# Patient Record
Sex: FEMALE | Race: WHITE | NOT HISPANIC OR LATINO | Employment: OTHER | URBAN - METROPOLITAN AREA
[De-identification: names, ages, dates, MRNs, and addresses within clinical notes are randomized per-mention and may not be internally consistent; named-entity substitution may affect disease eponyms.]

---

## 2017-01-12 ENCOUNTER — HOSPITAL ENCOUNTER (OUTPATIENT)
Dept: RADIOLOGY | Facility: HOSPITAL | Age: 77
Discharge: HOME | End: 2017-01-12
Attending: NURSE PRACTITIONER
Payer: MEDICARE

## 2017-01-12 ENCOUNTER — OFFICE VISIT (OUTPATIENT)
Dept: URGENT CARE | Facility: CLINIC | Age: 77
End: 2017-01-12
Payer: MEDICARE

## 2017-01-12 VITALS
HEART RATE: 84 BPM | SYSTOLIC BLOOD PRESSURE: 120 MMHG | BODY MASS INDEX: 23.39 KG/M2 | OXYGEN SATURATION: 96 % | WEIGHT: 154.31 LBS | TEMPERATURE: 99 F | HEIGHT: 68 IN | DIASTOLIC BLOOD PRESSURE: 84 MMHG

## 2017-01-12 DIAGNOSIS — R05.9 COUGH: ICD-10-CM

## 2017-01-12 DIAGNOSIS — J06.9 ACUTE URI: Primary | ICD-10-CM

## 2017-01-12 DIAGNOSIS — H65.93 MIDDLE EAR EFFUSION, BILATERAL: ICD-10-CM

## 2017-01-12 PROCEDURE — 99214 OFFICE O/P EST MOD 30 MIN: CPT | Mod: S$GLB,,, | Performed by: NURSE PRACTITIONER

## 2017-01-12 PROCEDURE — 1160F RVW MEDS BY RX/DR IN RCRD: CPT | Mod: S$GLB,,, | Performed by: NURSE PRACTITIONER

## 2017-01-12 PROCEDURE — 71020 XR CHEST PA AND LATERAL: CPT | Mod: 26,,, | Performed by: RADIOLOGY

## 2017-01-12 PROCEDURE — 1159F MED LIST DOCD IN RCRD: CPT | Mod: S$GLB,,, | Performed by: NURSE PRACTITIONER

## 2017-01-12 PROCEDURE — 1157F ADVNC CARE PLAN IN RCRD: CPT | Mod: S$GLB,,, | Performed by: NURSE PRACTITIONER

## 2017-01-12 PROCEDURE — 3079F DIAST BP 80-89 MM HG: CPT | Mod: S$GLB,,, | Performed by: NURSE PRACTITIONER

## 2017-01-12 PROCEDURE — 71020 XR CHEST PA AND LATERAL: CPT | Mod: TC,PO

## 2017-01-12 PROCEDURE — 3074F SYST BP LT 130 MM HG: CPT | Mod: S$GLB,,, | Performed by: NURSE PRACTITIONER

## 2017-01-12 PROCEDURE — 99999 PR PBB SHADOW E&M-EST. PATIENT-LVL IV: CPT | Mod: PBBFAC,,, | Performed by: NURSE PRACTITIONER

## 2017-01-12 PROCEDURE — 1125F AMNT PAIN NOTED PAIN PRSNT: CPT | Mod: S$GLB,,, | Performed by: NURSE PRACTITIONER

## 2017-01-12 RX ORDER — BENZONATATE 100 MG/1
100 CAPSULE ORAL 3 TIMES DAILY PRN
Qty: 30 CAPSULE | Refills: 0 | Status: SHIPPED | OUTPATIENT
Start: 2017-01-12 | End: 2017-01-22

## 2017-01-12 RX ORDER — ALBUTEROL SULFATE 90 UG/1
1-2 AEROSOL, METERED RESPIRATORY (INHALATION) EVERY 6 HOURS PRN
Qty: 1 INHALER | Refills: 0 | Status: SHIPPED | OUTPATIENT
Start: 2017-01-12 | End: 2017-08-09

## 2017-01-12 RX ORDER — CETIRIZINE HYDROCHLORIDE 10 MG/1
10 TABLET ORAL DAILY
Qty: 10 TABLET | Refills: 0 | COMMUNITY
Start: 2017-01-12 | End: 2018-07-03

## 2017-01-12 RX ORDER — METHYLPREDNISOLONE 4 MG/1
TABLET ORAL
Qty: 1 PACKAGE | Refills: 0 | Status: SHIPPED | OUTPATIENT
Start: 2017-01-12 | End: 2017-08-09

## 2017-01-12 NOTE — MR AVS SNAPSHOT
[facility]  [address]  [facility] [address]  Phone: [phone]  Fax: [phone]                  Midgalia Luna   2017 10:40 AM   Office Visit    Description:  Female : 1940   Provider:  [facility]   Department:  [place] [facility]           Reason for Visit     Cough           Diagnoses this Visit        Comments    Acute URI    -  Primary Hydrate and rest.  If no improvement, follow up.      Cough     Take medications as ordered.  Will call with xray report and send prescription if needed.     Middle ear effusion, bilateral                To Do List           Future Appointments        Provider Department Dept Phone    2017 11:30 AM [facility] XR1 [facility] [phone]      Goals (5 Years of Data)     None       These Medications        Disp Refills Start End    albuterol 90 mcg/actuation inhaler 1 Inhaler 0 2017     Inhale 1-2 puffs into the lungs every 6 (six) hours as needed for Wheezing (Cough). - Inhalation    Pharmacy: [facility] [address] [place] [address] Ph #: [phone]       benzonatate (TESSALON) 100 MG capsule 30 capsule 0 2017    Take 1 capsule (100 mg total) by mouth 3 (three) times daily as needed for Cough (Do not take more than 6 capsules in a 24 hour period.). - Oral    Pharmacy: [facility] [address] [place] [address] Ph #: [phone]         PURCHASE these Medications (No prescription required)        Start End    cetirizine (ZYRTEC) 10 MG tablet 2017    Sig - Route: Take 1 tablet (10 mg total) by mouth once daily. - Oral    Class: OTC      [facility] On [facility]     [facility] On Call Nurse Care Line -  Assistance  Registered nurses in the [facility] On Call Center provide clinical advisement, health education, appointment booking, and other advisory services.  Call for this free service at [phone].              Medications           Message regarding Medications     Verify the changes and/or additions to your medication regime listed below are the same as discussed with your clinician today.  If any of these changes or additions are incorrect, please notify your healthcare provider.        START taking these NEW medications        Refills    albuterol 90 mcg/actuation inhaler 0    Sig: Inhale 1-2 puffs into the lungs every 6 (six) hours as needed for Wheezing (Cough).    Class: Normal    Route: Inhalation    benzonatate (TESSALON) 100 MG capsule 0    Sig: Take 1 capsule (100 mg total) by mouth 3 (three) times daily as needed for Cough (Do not take more than 6 capsules in a 24 hour period.).    Class: Normal    Route: Oral    cetirizine (ZYRTEC) 10 MG tablet 0    Sig: Take 1 tablet (10 mg total) by mouth once daily.    Class: OTC    Route: Oral           Verify that the below list of medications is an accurate representation of the medications you are currently taking.  If none reported, the list may be blank. If incorrect, please contact your healthcare provider. Carry this list with you in case of emergency.           Current Medications     diphenhydrAMINE (BENADRYL ALLERGY) 25 mg tablet Take 1 tablet by mouth Daily.    furosemide (LASIX) 40 MG tablet Take 1 tablet (40 mg total) by mouth daily as needed.    omeprazole (PRILOSEC) 40 MG capsule Take 1 capsule (40 mg total) by mouth once daily.    sumatriptan (IMITREX) 100 MG tablet Take 1 tablet (100 mg total) by mouth 2 (two) times daily as needed.    verapamil (VERELAN) 180 MG C24P Take 1 capsule (180 mg total) by mouth once daily.    zolpidem (AMBIEN) 10 mg Tab TAKE 1 TABLET EVERY NIGHT AS NEEDED    albuterol 90 mcg/actuation inhaler Inhale 1-2 puffs into the lungs every 6 (six) hours as needed for Wheezing (Cough).    benzonatate (TESSALON) 100 MG capsule Take 1 capsule (100 mg total) by mouth 3 (three) times daily as needed for Cough (Do not take more than 6  "capsules in a 24 hour period.).    cetirizine (ZYRTEC) 10 MG tablet Take 1 tablet (10 mg total) by mouth once daily.    meloxicam (MOBIC) 7.5 MG tablet TAKE 1 TABLET TWICE DAILY AS NEEDED FOR PAIN           Clinical Reference Information           Vital Signs - Last Recorded  Most recent update: 1/12/2017 11:03 AM by Winnie Michele LPN    BP Pulse Temp Ht Wt SpO2    120/84 (BP Location: Left arm, Patient Position: Sitting, BP Method: Manual) 84 98.6 °F (37 °C) (Tympanic) 5' 8" (1.727 m) 70 kg (154 lb 5.2 oz) 96%    BMI                23.46 kg/m2          Blood Pressure          Most Recent Value    BP  120/84      Allergies as of 1/12/2017     Sulfa (Sulfonamide Antibiotics)      Immunizations Administered on Date of Encounter - 1/12/2017     None      Orders Placed During Today's Visit     Future Labs/Procedures Expected by Expires    X-Ray Chest PA And Lateral  1/12/2017 1/12/2018      Instructions      Adult Self-Care for Colds  Colds are caused by viruses. They cant be cured with antibiotics. However, you can relieve symptoms and support your bodys efforts to heal itself. No matter which symptoms you have, be sure to drink plenty of fluids (water or clear soup); stop smoking and drinking alcohol; and get plenty of rest.   Understand a fever  · Take your temperature several times a day. If your fever is 100.4°F (38.0°C) for more than a day, call your doctor.  · Relax, lie down. Go to bed if you want. Just get off your feet and rest. Also, drink plenty of fluids to avoid dehydration.  · Take acetaminophen or a nonsteroidal anti-inflammatory agent (NSAID), such as ibuprofen.  Treat a troubled nose kindly  · Breathe steam or heated humidified air to open blocked nasal passages.  a hot shower or use a vaporizer. Be careful not to get burned by the steam.  · Saline nasal sprays and decongestant tablets help open a stuffy nose. Antihistamines can also help, but they can cause side effects such as drowsiness " and drying of the eyes, nose, and mouth.  Soothe a sore throat and cough  · Gargle every 2 hours with 1/4 teaspoon of salt dissolved in 1/2 cup of warm water. Suck on throat lozenges and cough drops to moisten your throat.  · Cough medicines are available but it is unclear how effective they actually are.  · Take acetaminophen or an NSAID, such as ibuprofen to ease throat pain  Ease digestive problems  · Put fluid back into your body. Take frequent sips of clear liquids such as water or broth. Do not drink beverages with a lot of sugar in them, such as juices and sodas. These can make diarrhea worse. Older children and adults can drink sports drinks.  · As your appetite returns, you can resume your normal diet. Ask your doctor whether there are any foods you should avoid.     When to seek medical care  When you first notice symptoms, ask your health care provider if antiviral medications are appropriate. Antibiotics should not be taken for colds or flu. Also, call your doctor if you have any of the following symptoms or if you arent feeling better after 7 days:  · Shortness of breath  · Pain or pressure in the chest or abdomen  · Worsening symptoms, especially after a period of improvement  · Fever of 100.4°F  (38.0°C) or higher, or fever that doesnt go down with medication  · Sudden dizziness or confusion  · Severe or continued vomiting  · Signs of dehydration, including extreme thirst, dark urine, infrequent urination, dry mouth  · Spotted, red, or very sore throat      © 2867-0506 [employer]. [address] [place] [address], [place] [address]. All rights reserved. This information is not intended as a substitute for professional medical care. Always follow your healthcare professional's instructions.

## 2017-01-12 NOTE — PROGRESS NOTES
"CC:COUGH  HPI:This is a new problem.   Migdalia Luna is a 76 y.o. female with a history of cough.  The current episode started in the past 2 days.   The problem has been gradually worsening.   Associated symptoms included fever, chills, nasal congestion, cough, dyspnea, wheezing, right ear pain.   Pertinent negatives include sore throat, chest pain, hemoptysis   Treatments tried: Nyquil/Dayquil has been used and this has provided no relief.     Review of patient's allergies indicates:   Allergen Reactions    Sulfa (sulfonamide antibiotics)      Other reaction(s): Unknown       Outpatient Medications Prior to Visit   Medication Sig Dispense Refill    diphenhydrAMINE (BENADRYL ALLERGY) 25 mg tablet Take 1 tablet by mouth Daily.      furosemide (LASIX) 40 MG tablet Take 1 tablet (40 mg total) by mouth daily as needed. 90 tablet 3    omeprazole (PRILOSEC) 40 MG capsule Take 1 capsule (40 mg total) by mouth once daily. 90 capsule 3    sumatriptan (IMITREX) 100 MG tablet Take 1 tablet (100 mg total) by mouth 2 (two) times daily as needed. 27 tablet 3    verapamil (VERELAN) 180 MG C24P Take 1 capsule (180 mg total) by mouth once daily. 90 capsule 3    zolpidem (AMBIEN) 10 mg Tab TAKE 1 TABLET EVERY NIGHT AS NEEDED 90 tablet 1    meloxicam (MOBIC) 7.5 MG tablet TAKE 1 TABLET TWICE DAILY AS NEEDED FOR PAIN 180 tablet 3     No facility-administered medications prior to visit.         Physical Exam   Visit Vitals    /84 (BP Location: Left arm, Patient Position: Sitting, BP Method: Manual)    Pulse 84    Temp 98.6 °F (37 °C) (Tympanic)    Ht 5' 8" (1.727 m)    Wt 70 kg (154 lb 5.2 oz)    SpO2 96%    BMI 23.46 kg/m2     Constitutional: The patient appears well-developed and well-nourished.   Head: Normocephalic and atraumatic.   Right Ear: Tympanic membrane and ear canal normal. No drainage, swelling or tenderness. Tympanic membrane has effusion   Left Ear: Ear canal normal. No drainage, swelling or " tenderness. Tympanic membrane has effusion  Nose:  No mucosal edema or rhinitis is noted.      Mouth/Throat: Uvula is midline.  Posterior oropharynx is mildly erythematous. No oropharyngeal exudate is noted.    Cardiovascular: Normal rate, regular rhythm, S1 normal, S2 normal and normal heart sounds.  Exam reveals no gallop, no S3, no S4 and no friction rub.    No murmur heard.  Pulmonary/Chest: Effort normal and breath sounds are coarse especially with cough. No stridor. Not tachypneic. No respiratory distress. The patient has no wheezes. The patient has no rhonchi. The patient has no rales.   Skin: The patient is not diaphoretic.     Encounter Diagnoses   Name Primary?    Acute URI Yes    Cough        PLAN:    Migdalia was seen today for cough.    Diagnoses and all orders for this visit:    Acute URI  Comments:  Hydrate and rest.  If no improvement, follow up.      Cough  Comments:  Take medications as ordered.  Will call with [facility] report and send prescription if needed.   Orders:  -     X-Ray Chest PA And Lateral; Future  -     albuterol 90 mcg/actuation inhaler; Inhale 1-2 puffs into the lungs every 6 (six) hours as needed for Wheezing (Cough).  -     benzonatate (TESSALON) 100 MG capsule; Take 1 capsule (100 mg total) by mouth 3 (three) times daily as needed for Cough (Do not take more than 6 capsules in a 24 hour period.).        Medications Ordered This Encounter      albuterol 90 mcg/actuation inhaler          Sig: Inhale 1-2 puffs into the lungs every 6 (six) hours as needed for Wheezing (Cough).          Dispense:  1 Inhaler          Refill:  0      benzonatate (TESSALON) 100 MG capsule          Sig: Take 1 capsule (100 mg total) by mouth 3 (three) times daily as needed for Cough (Do not take more than 6 capsules in a 24 hour period.).          Dispense:  30 capsule          Refill:  0  Orders Placed This Encounter   Procedures    X-Ray Chest PA And Lateral     Standing Status:   Future     Standing  Expiration Date:   1/12/2018     RTC if symptoms are worsening or changing significantly or if not improved by the end of therapy.   Will send results of chest x-ray through patient portal.

## 2017-01-12 NOTE — PATIENT INSTRUCTIONS
Adult Self-Care for Colds  Colds are caused by viruses. They cant be cured with antibiotics. However, you can relieve symptoms and support your bodys efforts to heal itself. No matter which symptoms you have, be sure to drink plenty of fluids (water or clear soup); stop smoking and drinking alcohol; and get plenty of rest.   Understand a fever  · Take your temperature several times a day. If your fever is 100.4°F (38.0°C) for more than a day, call your doctor.  · Relax, lie down. Go to bed if you want. Just get off your feet and rest. Also, drink plenty of fluids to avoid dehydration.  · Take acetaminophen or a nonsteroidal anti-inflammatory agent (NSAID), such as ibuprofen.  Treat a troubled nose kindly  · Breathe steam or heated humidified air to open blocked nasal passages.  a hot shower or use a vaporizer. Be careful not to get burned by the steam.  · Saline nasal sprays and decongestant tablets help open a stuffy nose. Antihistamines can also help, but they can cause side effects such as drowsiness and drying of the eyes, nose, and mouth.  Soothe a sore throat and cough  · Gargle every 2 hours with 1/4 teaspoon of salt dissolved in 1/2 cup of warm water. Suck on throat lozenges and cough drops to moisten your throat.  · Cough medicines are available but it is unclear how effective they actually are.  · Take acetaminophen or an NSAID, such as ibuprofen to ease throat pain  Ease digestive problems  · Put fluid back into your body. Take frequent sips of clear liquids such as water or broth. Do not drink beverages with a lot of sugar in them, such as juices and sodas. These can make diarrhea worse. Older children and adults can drink sports drinks.  · As your appetite returns, you can resume your normal diet. Ask your doctor whether there are any foods you should avoid.     When to seek medical care  When you first notice symptoms, ask your health care provider if antiviral medications are  appropriate. Antibiotics should not be taken for colds or flu. Also, call your doctor if you have any of the following symptoms or if you arent feeling better after 7 days:  · Shortness of breath  · Pain or pressure in the chest or abdomen  · Worsening symptoms, especially after a period of improvement  · Fever of 100.4°F  (38.0°C) or higher, or fever that doesnt go down with medication  · Sudden dizziness or confusion  · Severe or continued vomiting  · Signs of dehydration, including extreme thirst, dark urine, infrequent urination, dry mouth  · Spotted, red, or very sore throat      © 0541-3159 [employer]. [address] [place] [address], [place] [address]. All rights reserved. This information is not intended as a substitute for professional medical care. Always follow your healthcare professional's instructions.

## 2017-02-24 ENCOUNTER — PATIENT MESSAGE (OUTPATIENT)
Dept: INTERNAL MEDICINE | Facility: CLINIC | Age: 77
End: 2017-02-24

## 2017-03-22 DIAGNOSIS — G43.909 MIGRAINE WITHOUT STATUS MIGRAINOSUS, NOT INTRACTABLE, UNSPECIFIED MIGRAINE TYPE: ICD-10-CM

## 2017-03-22 RX ORDER — ZOLPIDEM TARTRATE 10 MG/1
TABLET ORAL
Qty: 90 TABLET | Refills: 1 | Status: SHIPPED | OUTPATIENT
Start: 2017-03-22 | End: 2017-11-29 | Stop reason: SDUPTHER

## 2017-03-22 RX ORDER — SUMATRIPTAN SUCCINATE 100 MG/1
TABLET ORAL
Qty: 27 TABLET | Refills: 3 | Status: SHIPPED | OUTPATIENT
Start: 2017-03-22 | End: 2018-01-25 | Stop reason: SDUPTHER

## 2017-05-27 DIAGNOSIS — M81.0 POSTMENOPAUSAL BONE LOSS: ICD-10-CM

## 2017-05-27 DIAGNOSIS — Z12.31 SCREENING MAMMOGRAM, ENCOUNTER FOR: ICD-10-CM

## 2017-05-27 DIAGNOSIS — I10 ESSENTIAL HYPERTENSION: Primary | ICD-10-CM

## 2017-05-27 DIAGNOSIS — E78.5 HYPERLIPIDEMIA, UNSPECIFIED HYPERLIPIDEMIA TYPE: ICD-10-CM

## 2017-05-29 ENCOUNTER — TELEPHONE (OUTPATIENT)
Dept: INTERNAL MEDICINE | Facility: CLINIC | Age: 77
End: 2017-05-29

## 2017-05-29 RX ORDER — FUROSEMIDE 40 MG/1
TABLET ORAL
Qty: 90 TABLET | Refills: 3 | Status: SHIPPED | OUTPATIENT
Start: 2017-05-29 | End: 2018-05-16 | Stop reason: SDUPTHER

## 2017-05-29 RX ORDER — VERAPAMIL HYDROCHLORIDE 180 MG/1
CAPSULE, EXTENDED RELEASE ORAL
Qty: 90 CAPSULE | Refills: 3 | Status: SHIPPED | OUTPATIENT
Start: 2017-05-29 | End: 2018-05-16 | Stop reason: SDUPTHER

## 2017-06-02 ENCOUNTER — PATIENT MESSAGE (OUTPATIENT)
Dept: INTERNAL MEDICINE | Facility: CLINIC | Age: 77
End: 2017-06-02

## 2017-06-06 ENCOUNTER — LAB VISIT (OUTPATIENT)
Dept: LAB | Facility: HOSPITAL | Age: 77
End: 2017-06-06
Attending: INTERNAL MEDICINE
Payer: MEDICARE

## 2017-06-06 DIAGNOSIS — I10 ESSENTIAL HYPERTENSION: ICD-10-CM

## 2017-06-06 LAB
ALBUMIN SERPL BCP-MCNC: 3.4 G/DL
ALP SERPL-CCNC: 84 U/L
ALT SERPL W/O P-5'-P-CCNC: 14 U/L
ANION GAP SERPL CALC-SCNC: 9 MMOL/L
AST SERPL-CCNC: 18 U/L
BASOPHILS # BLD AUTO: 0.02 K/UL
BASOPHILS NFR BLD: 0.4 %
BILIRUB SERPL-MCNC: 0.3 MG/DL
BUN SERPL-MCNC: 16 MG/DL
CALCIUM SERPL-MCNC: 9.4 MG/DL
CHLORIDE SERPL-SCNC: 109 MMOL/L
CHOLEST/HDLC SERPL: 3.8 {RATIO}
CO2 SERPL-SCNC: 25 MMOL/L
CREAT SERPL-MCNC: 0.9 MG/DL
DIFFERENTIAL METHOD: ABNORMAL
EOSINOPHIL # BLD AUTO: 0.2 K/UL
EOSINOPHIL NFR BLD: 4 %
ERYTHROCYTE [DISTWIDTH] IN BLOOD BY AUTOMATED COUNT: 14.3 %
EST. GFR  (AFRICAN AMERICAN): >60 ML/MIN/1.73 M^2
EST. GFR  (NON AFRICAN AMERICAN): >60 ML/MIN/1.73 M^2
GLUCOSE SERPL-MCNC: 79 MG/DL
HCT VFR BLD AUTO: 37.1 %
HDL/CHOLESTEROL RATIO: 26 %
HDLC SERPL-MCNC: 192 MG/DL
HDLC SERPL-MCNC: 50 MG/DL
HGB BLD-MCNC: 11.8 G/DL
LDLC SERPL CALC-MCNC: 116.4 MG/DL
LYMPHOCYTES # BLD AUTO: 1.6 K/UL
LYMPHOCYTES NFR BLD: 30.2 %
MCH RBC QN AUTO: 32.3 PG
MCHC RBC AUTO-ENTMCNC: 31.8 %
MCV RBC AUTO: 102 FL
MONOCYTES # BLD AUTO: 0.5 K/UL
MONOCYTES NFR BLD: 10.1 %
NEUTROPHILS # BLD AUTO: 2.9 K/UL
NEUTROPHILS NFR BLD: 55.1 %
NONHDLC SERPL-MCNC: 142 MG/DL
PLATELET # BLD AUTO: 369 K/UL
PMV BLD AUTO: 9.8 FL
POTASSIUM SERPL-SCNC: 4.6 MMOL/L
PROT SERPL-MCNC: 6.5 G/DL
RBC # BLD AUTO: 3.65 M/UL
SODIUM SERPL-SCNC: 143 MMOL/L
TRIGL SERPL-MCNC: 128 MG/DL
TSH SERPL DL<=0.005 MIU/L-ACNC: 2.74 UIU/ML
WBC # BLD AUTO: 5.23 K/UL

## 2017-06-06 PROCEDURE — 80061 LIPID PANEL: CPT

## 2017-06-06 PROCEDURE — 85025 COMPLETE CBC W/AUTO DIFF WBC: CPT

## 2017-06-06 PROCEDURE — 36415 COLL VENOUS BLD VENIPUNCTURE: CPT | Mod: PO

## 2017-06-06 PROCEDURE — 80053 COMPREHEN METABOLIC PANEL: CPT

## 2017-06-06 PROCEDURE — 84443 ASSAY THYROID STIM HORMONE: CPT

## 2017-06-21 ENCOUNTER — HOSPITAL ENCOUNTER (OUTPATIENT)
Dept: RADIOLOGY | Facility: HOSPITAL | Age: 77
Discharge: HOME | End: 2017-06-21
Attending: INTERNAL MEDICINE
Payer: MEDICARE

## 2017-06-21 VITALS — HEIGHT: 68 IN | BODY MASS INDEX: 23.34 KG/M2 | WEIGHT: 154 LBS

## 2017-06-21 DIAGNOSIS — Z12.31 SCREENING MAMMOGRAM, ENCOUNTER FOR: ICD-10-CM

## 2017-06-21 PROCEDURE — 77067 SCR MAMMO BI INCL CAD: CPT | Mod: TC

## 2017-06-21 PROCEDURE — 77067 SCR MAMMO BI INCL CAD: CPT | Mod: 26,,, | Performed by: RADIOLOGY

## 2017-06-21 PROCEDURE — 77063 BREAST TOMOSYNTHESIS BI: CPT | Mod: 26,,, | Performed by: RADIOLOGY

## 2017-08-09 ENCOUNTER — OFFICE VISIT (OUTPATIENT)
Dept: INTERNAL MEDICINE | Facility: CLINIC | Age: 77
End: 2017-08-09
Payer: MEDICARE

## 2017-08-09 VITALS
SYSTOLIC BLOOD PRESSURE: 126 MMHG | HEIGHT: 68 IN | BODY MASS INDEX: 23.95 KG/M2 | TEMPERATURE: 99 F | HEART RATE: 79 BPM | WEIGHT: 158.06 LBS | OXYGEN SATURATION: 97 % | DIASTOLIC BLOOD PRESSURE: 78 MMHG

## 2017-08-09 DIAGNOSIS — K21.9 GASTROESOPHAGEAL REFLUX DISEASE WITHOUT ESOPHAGITIS: ICD-10-CM

## 2017-08-09 DIAGNOSIS — G43.109 MIGRAINE WITH AURA AND WITHOUT STATUS MIGRAINOSUS, NOT INTRACTABLE: ICD-10-CM

## 2017-08-09 DIAGNOSIS — I10 ESSENTIAL HYPERTENSION: ICD-10-CM

## 2017-08-09 DIAGNOSIS — F32.A DEPRESSION, UNSPECIFIED DEPRESSION TYPE: ICD-10-CM

## 2017-08-09 DIAGNOSIS — Z00.00 ROUTINE GENERAL MEDICAL EXAMINATION AT A HEALTH CARE FACILITY: Primary | ICD-10-CM

## 2017-08-09 DIAGNOSIS — E78.5 HYPERLIPIDEMIA, UNSPECIFIED HYPERLIPIDEMIA TYPE: ICD-10-CM

## 2017-08-09 PROCEDURE — 99499 UNLISTED E&M SERVICE: CPT | Mod: S$GLB,,, | Performed by: INTERNAL MEDICINE

## 2017-08-09 PROCEDURE — 99397 PER PM REEVAL EST PAT 65+ YR: CPT | Mod: S$GLB,,, | Performed by: INTERNAL MEDICINE

## 2017-08-09 PROCEDURE — 99999 PR PBB SHADOW E&M-EST. PATIENT-LVL III: CPT | Mod: PBBFAC,,, | Performed by: INTERNAL MEDICINE

## 2017-08-09 NOTE — PROGRESS NOTES
HPI:  Patient is a 77-year-old female who comes in today for her annual physical.  Patient at this time only complains of problems with urination.  She states that she has the urge to go but goes just a little bit and still has the sensation that she needs to go but can't.  She can then place her fingers and her with vaginal orifice and press upward and she will have copious amounts of urine come out.  This is been progressive for the last several months.  She also has progressive problems with her left shoulder.  She is already been seen by orthopedics position and offered surgical intervention, but she is declined.  Otherwise, she is been doing fine.  Her migraines have been stable.  Her blood pressures been well-controlled    Current MEDS: medcard review, verified and update  Allergies: Per the electronic medical record    Past Medical History:   Diagnosis Date    Anemia     Arthritis     left shoulder    Asthma     Chronic bronchitis     Depression     Essential hypertension     GERD (gastroesophageal reflux disease)     Hyperlipidemia     Insomnia     Migraine     Thyroid disease        Past Surgical History:   Procedure Laterality Date    BACK SURGERY      epidural    BREAST BIOPSY Right over 10 yrs ago    benign    COLONOSCOPY N/A 7/11/2016    Procedure: COLONOSCOPY;  Surgeon: Yordy Penn MD;  [facility];  Service: Endoscopy;  Laterality: N/A;    KNEE ARTHROPLASTY      KNEE ARTHROSCOPY      bilaterly    TONSILLECTOMY         SHx: per the electronic medical record    FHx: recorded in the electronic medical record    ROS:    denies any chest pains or shortness of breath. Denies any nausea, vomiting or diarrhea. Denies any fever, chills or sweats. Denies any change in weight, voice, stool, skin or hair. Denies any dysuria, dyspepsia or dysphagia. Denies any change in vision, hearing or headaches. Denies any swollen lymph nodes or loss of memory.    PE:  /78   Pulse 79    "Temp 98.6 °F (37 °C) (Tympanic)   Ht 5' 8" (1.727 m)   Wt 71.7 kg (158 lb 1.1 oz)   SpO2 97%   BMI 24.03 kg/m²   Gen: Well-developed, well-nourished, female, in no acute distress, oriented x3  HEENT: neck is supple, no adenopathy, carotids 2+ equal without bruits, thyroid exam normal size without nodules.  CHEST: clear to auscultation and percussion  CVS: regular rate and rhythm without significant murmur, gallop, or rubs  ABD: soft, benign, no rebound no guarding, no distention. Bowel sounds are normal.     Nontender,  no palpable masses, no organomegaly and no audible bruits.  BREAST: no masses.  No nipple inversion, retraction, or deviation.  EXT: no clubbing, cyanosis, or edema  LYMPH: no cervical, inguinal, or axillary adenopathy  FEET: no loss of sensation.  No ulcers or pressure sores.  NEURO: gait normal.  Cranial nerves II- XII intact. No nystagmus.  Speech normal.   Gross motor and sensory unremarkable.  PELVIC: External genitalia unremarkable.  She has weakness of the anterior vaginal wall with a mild grade 1 cystocele.  Remainder of exam was deferred    Lab Results   Component Value Date    WBC 5.23 06/06/2017    HGB 11.8 (L) 06/06/2017    HCT 37.1 06/06/2017     (H) 06/06/2017    CHOL 192 06/06/2017    TRIG 128 06/06/2017    HDL 50 06/06/2017    ALT 14 06/06/2017    AST 18 06/06/2017     06/06/2017    K 4.6 06/06/2017     06/06/2017    CREATININE 0.9 06/06/2017    BUN 16 06/06/2017    CO2 25 06/06/2017    TSH 2.737 06/06/2017    INR 1.0 06/09/2011       Impression:  Cystocele with symptoms of incomplete voiding  Patient Active Problem List   Diagnosis    GERD (gastroesophageal reflux disease)    Hyperlipidemia    Depression    Insomnia    Migraine    Essential hypertension       Plan:      Patient was instructed on kegels  Exercises.  Patient was told of this been no improvement within 3-6 months She should consider surgical intervention.  Her medications remain the same.  " She'll see me on a yearly basis or otherwise as needed

## 2017-08-20 ENCOUNTER — PATIENT MESSAGE (OUTPATIENT)
Dept: INTERNAL MEDICINE | Facility: CLINIC | Age: 77
End: 2017-08-20

## 2017-08-20 DIAGNOSIS — M25.512 CHRONIC PAIN OF BOTH SHOULDERS: Primary | ICD-10-CM

## 2017-08-20 DIAGNOSIS — G89.29 CHRONIC PAIN OF BOTH SHOULDERS: Primary | ICD-10-CM

## 2017-08-20 DIAGNOSIS — M25.511 CHRONIC PAIN OF BOTH SHOULDERS: Primary | ICD-10-CM

## 2017-08-21 NOTE — TELEPHONE ENCOUNTER
I attempted to put Referral in but could not find Dr Fam's name. Please put in the referral and send it to me .

## 2017-09-26 ENCOUNTER — PATIENT MESSAGE (OUTPATIENT)
Dept: INTERNAL MEDICINE | Facility: CLINIC | Age: 77
End: 2017-09-26

## 2017-09-27 ENCOUNTER — PATIENT MESSAGE (OUTPATIENT)
Dept: INTERNAL MEDICINE | Facility: CLINIC | Age: 77
End: 2017-09-27

## 2017-09-27 ENCOUNTER — TELEPHONE (OUTPATIENT)
Dept: GASTROENTEROLOGY | Facility: CLINIC | Age: 77
End: 2017-09-27

## 2017-09-27 NOTE — TELEPHONE ENCOUNTER
----- Message from Sunshine Brush sent at 9/27/2017 12:44 PM CDT -----  Contact: pt   Pt would like to speak with office about up coming appt.,,,Please call pt back at [phone]

## 2017-09-27 NOTE — TELEPHONE ENCOUNTER
Returned call to pt who stated that her upcoming clinic appt with Dr. Penn was made in error. Appt cancelled.

## 2017-11-29 RX ORDER — ZOLPIDEM TARTRATE 10 MG/1
TABLET ORAL
Qty: 90 TABLET | Refills: 1 | Status: SHIPPED | OUTPATIENT
Start: 2017-11-29 | End: 2018-05-16 | Stop reason: SDUPTHER

## 2017-12-13 ENCOUNTER — PATIENT MESSAGE (OUTPATIENT)
Dept: INTERNAL MEDICINE | Facility: CLINIC | Age: 77
End: 2017-12-13

## 2018-01-03 ENCOUNTER — OFFICE VISIT (OUTPATIENT)
Dept: URGENT CARE | Facility: CLINIC | Age: 78
End: 2018-01-03
Payer: MEDICARE

## 2018-01-03 ENCOUNTER — TELEPHONE (OUTPATIENT)
Dept: INTERNAL MEDICINE | Facility: CLINIC | Age: 78
End: 2018-01-03

## 2018-01-03 ENCOUNTER — HOSPITAL ENCOUNTER (OUTPATIENT)
Dept: RADIOLOGY | Facility: HOSPITAL | Age: 78
Discharge: HOME | End: 2018-01-03
Attending: NURSE PRACTITIONER
Payer: MEDICARE

## 2018-01-03 VITALS
BODY MASS INDEX: 23.99 KG/M2 | OXYGEN SATURATION: 97 % | HEART RATE: 89 BPM | HEIGHT: 68 IN | DIASTOLIC BLOOD PRESSURE: 88 MMHG | WEIGHT: 158.31 LBS | SYSTOLIC BLOOD PRESSURE: 126 MMHG | TEMPERATURE: 99 F

## 2018-01-03 DIAGNOSIS — R50.9 FEVER, UNSPECIFIED FEVER CAUSE: ICD-10-CM

## 2018-01-03 DIAGNOSIS — R05.9 COUGH: ICD-10-CM

## 2018-01-03 DIAGNOSIS — R06.2 WHEEZING: ICD-10-CM

## 2018-01-03 DIAGNOSIS — J22 BACTERIAL LOWER RESPIRATORY INFECTION: ICD-10-CM

## 2018-01-03 DIAGNOSIS — R05.9 COUGH: Primary | ICD-10-CM

## 2018-01-03 DIAGNOSIS — B96.89 BACTERIAL LOWER RESPIRATORY INFECTION: ICD-10-CM

## 2018-01-03 LAB
CTP QC/QA: YES
FLUAV AG NPH QL: NEGATIVE
FLUBV AG NPH QL: NEGATIVE

## 2018-01-03 PROCEDURE — 99214 OFFICE O/P EST MOD 30 MIN: CPT | Mod: 25,S$GLB,, | Performed by: NURSE PRACTITIONER

## 2018-01-03 PROCEDURE — 94640 AIRWAY INHALATION TREATMENT: CPT | Mod: S$GLB,,, | Performed by: FAMILY MEDICINE

## 2018-01-03 PROCEDURE — 71046 X-RAY EXAM CHEST 2 VIEWS: CPT | Mod: 26,,, | Performed by: RADIOLOGY

## 2018-01-03 PROCEDURE — 71046 X-RAY EXAM CHEST 2 VIEWS: CPT | Mod: TC,FY,PO

## 2018-01-03 PROCEDURE — 99999 PR PBB SHADOW E&M-EST. PATIENT-LVL IV: CPT | Mod: PBBFAC,,, | Performed by: FAMILY MEDICINE

## 2018-01-03 PROCEDURE — 87804 INFLUENZA ASSAY W/OPTIC: CPT | Mod: QW,S$GLB,, | Performed by: NURSE PRACTITIONER

## 2018-01-03 RX ORDER — ALBUTEROL SULFATE 90 UG/1
2 AEROSOL, METERED RESPIRATORY (INHALATION) EVERY 6 HOURS PRN
Qty: 1 INHALER | Refills: 2 | Status: SHIPPED | OUTPATIENT
Start: 2018-01-03 | End: 2018-07-03

## 2018-01-03 RX ORDER — IPRATROPIUM BROMIDE AND ALBUTEROL SULFATE 2.5; .5 MG/3ML; MG/3ML
3 SOLUTION RESPIRATORY (INHALATION)
Status: COMPLETED | OUTPATIENT
Start: 2018-01-03 | End: 2018-01-03

## 2018-01-03 RX ORDER — METHYLPREDNISOLONE 4 MG/1
TABLET ORAL
Qty: 1 PACKAGE | Refills: 0 | Status: SHIPPED | OUTPATIENT
Start: 2018-01-03 | End: 2018-07-03

## 2018-01-03 RX ORDER — ALBUTEROL SULFATE 0.83 MG/ML
2.5 SOLUTION RESPIRATORY (INHALATION) EVERY 6 HOURS PRN
Qty: 1 BOX | Refills: 0 | Status: SHIPPED | OUTPATIENT
Start: 2018-01-03 | End: 2018-07-03

## 2018-01-03 RX ORDER — DOXYCYCLINE 100 MG/1
100 CAPSULE ORAL EVERY 12 HOURS
Qty: 14 CAPSULE | Refills: 0 | Status: SHIPPED | OUTPATIENT
Start: 2018-01-03 | End: 2018-01-10

## 2018-01-03 RX ADMIN — IPRATROPIUM BROMIDE AND ALBUTEROL SULFATE 3 ML: 2.5; .5 SOLUTION RESPIRATORY (INHALATION) at 05:01

## 2018-01-03 NOTE — PROGRESS NOTES
"Subjective:       Patient ID: Migdalia Luna is a 77 y.o. female.    Chief Complaint: Cough    HPI  Ms Luna presents with complaints of cough, nasal congestion, and fever T max 101.9. Onset last night. Cough is productive. She states she was wheezing last night. She states she is prone to bronchitis and has a neb machine at home but no medication for it. She also adds she has had pneumonia two times but it has been several years.  She has tried fever reducers with some relief.   /88 (BP Location: Left arm, Patient Position: Sitting, BP Method: Medium (Automatic))   Pulse 89   Temp 99.2 °F (37.3 °C) (Tympanic)   Ht 5' 8" (1.727 m)   Wt 71.8 kg (158 lb 4.6 oz)   SpO2 97%   BMI 24.07 kg/m²     Review of Systems   Constitutional: Positive for chills and fever. Negative for diaphoresis.   HENT: Positive for congestion, ear pain, postnasal drip and sore throat (Scratchy). Negative for sinus pressure. Facial swelling: Right.    Respiratory: Positive for cough and chest tightness. Negative for shortness of breath.    Cardiovascular: Negative for chest pain and palpitations.   Gastrointestinal: Negative for abdominal distention, abdominal pain, diarrhea, nausea and vomiting.   Genitourinary: Negative for difficulty urinating.   Musculoskeletal: Negative for myalgias.   Skin: Negative for rash and wound.   Allergic/Immunologic: Negative for immunocompromised state.   Neurological: Negative for headaches.   Hematological: Does not bruise/bleed easily.   Psychiatric/Behavioral: Negative for behavioral problems and confusion.       Objective:      Physical Exam   Constitutional: She is oriented to person, place, and time. She appears well-developed and well-nourished. No distress.   HENT:   Head: Normocephalic and atraumatic.   Right Ear: Ear canal normal. A middle ear effusion is present.   Left Ear: Tympanic membrane and ear canal normal.   Nose: Nose normal. No mucosal edema. Right sinus exhibits no maxillary " sinus tenderness and no frontal sinus tenderness. Left sinus exhibits no maxillary sinus tenderness and no frontal sinus tenderness.   Mouth/Throat: Uvula is midline. No oropharyngeal exudate, posterior oropharyngeal edema or posterior oropharyngeal erythema.   Eyes: Conjunctivae and EOM are normal. Pupils are equal, round, and reactive to light.   Neck: Neck supple.   Cardiovascular: Normal rate, regular rhythm and intact distal pulses.    No murmur heard.  Pulmonary/Chest: No respiratory distress. She has wheezes (Scattered expiratory). She has rhonchi in the right lower field and the left lower field.   Abdominal: Soft. Bowel sounds are normal. There is no tenderness.   Musculoskeletal: Normal range of motion. She exhibits no edema or deformity.   Lymphadenopathy:     She has no cervical adenopathy.   Neurological: She is alert and oriented to person, place, and time.   Skin: Skin is warm and dry. No rash noted. She is not diaphoretic.   Psychiatric: She has a normal mood and affect. Her behavior is normal.   Vitals reviewed.      Assessment:       1. Cough    2. Wheezing    3. Fever, unspecified fever cause        Plan:       Migdalia was seen today for cough.    Diagnoses and all orders for this visit:    Cough  -     albuterol-ipratropium 2.5mg-0.5mg/3mL nebulizer solution 3 mL; Take 3 mLs by nebulization one time.  -     X-Ray Chest PA And Lateral; Future  -     albuterol (PROVENTIL) 2.5 mg /3 mL (0.083 %) nebulizer solution; Take 3 mLs (2.5 mg total) by nebulization every 6 (six) hours as needed for Wheezing.  -     albuterol 90 mcg/actuation inhaler; Inhale 2 puffs into the lungs every 6 (six) hours as needed for Wheezing. Rescue  -     methylPREDNISolone (MEDROL DOSEPACK) 4 mg tablet; use as directed    Wheezing  -     albuterol-ipratropium 2.5mg-0.5mg/3mL nebulizer solution 3 mL; Take 3 mLs by nebulization one time.  -     X-Ray Chest PA And Lateral; Future  -     albuterol (PROVENTIL) 2.5 mg /3 mL (0.083 %)  nebulizer solution; Take 3 mLs (2.5 mg total) by nebulization every 6 (six) hours as needed for Wheezing.  -     albuterol 90 mcg/actuation inhaler; Inhale 2 puffs into the lungs every 6 (six) hours as needed for Wheezing. Rescue  -     methylPREDNISolone (MEDROL DOSEPACK) 4 mg tablet; use as directed    Fever, unspecified fever cause  -     POCT Influenza A/B  -     X-Ray Chest PA And Lateral; Future    CXR with no acute disease. Flu is negative. Concerned over fever and rhonchi, will treat with antibiotics  Finish the entire course of antibiotics even if symptoms improve  Patient counseled on symptomatic treatment.   - Rest  - Hydration-- 64 ounces fluid per day  - Cool mist humidifier/vaporizer  - Nasal saline/steroids  - Antihistamines  - Mucinex  - Gargles and lozenges for sore throat  - OTC pain/fever relievers    Follow up with PCP or ER immediately for worsening, new symptoms or no improvement. Go to ER if you develop fever of 103 or higher, chest pain, shortness of breath, upper back pain, stiff neck or severe headache.      Diagnosis, treatment, AVS reviewed with patient. Patient understands and agrees with plan

## 2018-01-25 DIAGNOSIS — G43.909 MIGRAINE WITHOUT STATUS MIGRAINOSUS, NOT INTRACTABLE, UNSPECIFIED MIGRAINE TYPE: ICD-10-CM

## 2018-01-25 RX ORDER — SUMATRIPTAN SUCCINATE 100 MG/1
TABLET ORAL
Qty: 27 TABLET | Refills: 3 | Status: SHIPPED | OUTPATIENT
Start: 2018-01-25 | End: 2018-07-03 | Stop reason: SDUPTHER

## 2018-03-02 ENCOUNTER — PES CALL (OUTPATIENT)
Dept: ADMINISTRATIVE | Facility: CLINIC | Age: 78
End: 2018-03-02

## 2018-05-17 RX ORDER — VERAPAMIL HYDROCHLORIDE 180 MG/1
CAPSULE, EXTENDED RELEASE ORAL
Qty: 90 CAPSULE | Refills: 0 | Status: SHIPPED | OUTPATIENT
Start: 2018-05-17 | End: 2018-07-03 | Stop reason: SDUPTHER

## 2018-05-17 RX ORDER — ZOLPIDEM TARTRATE 10 MG/1
TABLET ORAL
Qty: 90 TABLET | Refills: 0 | Status: SHIPPED | OUTPATIENT
Start: 2018-05-17 | End: 2018-07-03 | Stop reason: SDUPTHER

## 2018-05-17 RX ORDER — FUROSEMIDE 40 MG/1
TABLET ORAL
Qty: 90 TABLET | Refills: 0 | Status: SHIPPED | OUTPATIENT
Start: 2018-05-17 | End: 2018-07-03 | Stop reason: SDUPTHER

## 2018-05-30 ENCOUNTER — PATIENT MESSAGE (OUTPATIENT)
Dept: INTERNAL MEDICINE | Facility: CLINIC | Age: 78
End: 2018-05-30

## 2018-05-30 ENCOUNTER — TELEPHONE (OUTPATIENT)
Dept: INTERNAL MEDICINE | Facility: CLINIC | Age: 78
End: 2018-05-30

## 2018-05-30 DIAGNOSIS — I10 ESSENTIAL HYPERTENSION: Primary | ICD-10-CM

## 2018-05-30 DIAGNOSIS — Z12.39 SCREENING BREAST EXAMINATION: ICD-10-CM

## 2018-06-20 ENCOUNTER — PATIENT OUTREACH (OUTPATIENT)
Dept: ADMINISTRATIVE | Facility: HOSPITAL | Age: 78
End: 2018-06-20

## 2018-06-26 ENCOUNTER — HOSPITAL ENCOUNTER (OUTPATIENT)
Dept: RADIOLOGY | Facility: HOSPITAL | Age: 78
Discharge: HOME | End: 2018-06-26
Attending: INTERNAL MEDICINE
Payer: MEDICARE

## 2018-06-26 DIAGNOSIS — Z12.39 SCREENING BREAST EXAMINATION: ICD-10-CM

## 2018-06-26 PROCEDURE — 77063 BREAST TOMOSYNTHESIS BI: CPT | Mod: 26,,, | Performed by: RADIOLOGY

## 2018-06-26 PROCEDURE — 77067 SCR MAMMO BI INCL CAD: CPT | Mod: 26,,, | Performed by: RADIOLOGY

## 2018-06-26 PROCEDURE — 77067 SCR MAMMO BI INCL CAD: CPT | Mod: TC,PO

## 2018-07-03 ENCOUNTER — OFFICE VISIT (OUTPATIENT)
Dept: INTERNAL MEDICINE | Facility: CLINIC | Age: 78
End: 2018-07-03
Payer: MEDICARE

## 2018-07-03 VITALS
HEIGHT: 67 IN | SYSTOLIC BLOOD PRESSURE: 140 MMHG | BODY MASS INDEX: 24.67 KG/M2 | DIASTOLIC BLOOD PRESSURE: 80 MMHG | HEART RATE: 78 BPM | RESPIRATION RATE: 16 BRPM | OXYGEN SATURATION: 98 % | WEIGHT: 157.19 LBS | TEMPERATURE: 99 F

## 2018-07-03 DIAGNOSIS — E78.5 HYPERLIPIDEMIA, UNSPECIFIED HYPERLIPIDEMIA TYPE: ICD-10-CM

## 2018-07-03 DIAGNOSIS — I10 ESSENTIAL HYPERTENSION: ICD-10-CM

## 2018-07-03 DIAGNOSIS — G43.909 MIGRAINE WITHOUT STATUS MIGRAINOSUS, NOT INTRACTABLE, UNSPECIFIED MIGRAINE TYPE: ICD-10-CM

## 2018-07-03 DIAGNOSIS — Z00.00 ROUTINE GENERAL MEDICAL EXAMINATION AT A HEALTH CARE FACILITY: Primary | ICD-10-CM

## 2018-07-03 DIAGNOSIS — G43.109 MIGRAINE WITH AURA AND WITHOUT STATUS MIGRAINOSUS, NOT INTRACTABLE: ICD-10-CM

## 2018-07-03 PROCEDURE — 99999 PR PBB SHADOW E&M-EST. PATIENT-LVL III: CPT | Mod: PBBFAC,,, | Performed by: INTERNAL MEDICINE

## 2018-07-03 PROCEDURE — 3077F SYST BP >= 140 MM HG: CPT | Mod: CPTII,S$GLB,, | Performed by: INTERNAL MEDICINE

## 2018-07-03 PROCEDURE — 99397 PER PM REEVAL EST PAT 65+ YR: CPT | Mod: S$GLB,,, | Performed by: INTERNAL MEDICINE

## 2018-07-03 PROCEDURE — 3079F DIAST BP 80-89 MM HG: CPT | Mod: CPTII,S$GLB,, | Performed by: INTERNAL MEDICINE

## 2018-07-03 RX ORDER — VERAPAMIL HYDROCHLORIDE 180 MG/1
180 CAPSULE, EXTENDED RELEASE ORAL DAILY
Qty: 90 CAPSULE | Refills: 3 | Status: SHIPPED | OUTPATIENT
Start: 2018-07-03 | End: 2018-11-29

## 2018-07-03 RX ORDER — ZOLPIDEM TARTRATE 10 MG/1
TABLET ORAL
Qty: 90 TABLET | Refills: 1 | Status: SHIPPED | OUTPATIENT
Start: 2018-07-03 | End: 2018-12-18 | Stop reason: SDUPTHER

## 2018-07-03 RX ORDER — FUROSEMIDE 40 MG/1
40 TABLET ORAL
Qty: 90 TABLET | Refills: 3 | Status: SHIPPED | OUTPATIENT
Start: 2018-07-03 | End: 2019-07-08 | Stop reason: SDUPTHER

## 2018-07-03 RX ORDER — SUMATRIPTAN SUCCINATE 100 MG/1
TABLET ORAL
Qty: 27 TABLET | Refills: 3 | Status: SHIPPED | OUTPATIENT
Start: 2018-07-03 | End: 2019-07-08 | Stop reason: SDUPTHER

## 2018-07-03 NOTE — PROGRESS NOTES
"HPI:  Patient is a 78-year-old female comes today for follow-up of her hypertension, lipids, and for her annual physical.  She states that 2 months ago she was admitted to a local hospital, [facility], for syncopal episode.  She spent 5 days in the hospital and had every test she could think of done.  She also saw an electrophysiologist.  No etiology was determined.  She has had multiple episodes of syncope.  It happens 1-2 times per year  Otherwise he has been doing about the same.  Her blood pressures been controlled.  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  She refuses to let anybody do anything about it.    Current MEDS: medcard review, verified and update  Allergies: Per the electronic medical record    Past Medical History:   Diagnosis Date    Anemia     Arthritis     left shoulder    Asthma     Chronic bronchitis     Depression     Essential hypertension     GERD (gastroesophageal reflux disease)     Hyperlipidemia     Insomnia     Migraine     Thyroid disease        Past Surgical History:   Procedure Laterality Date    BACK SURGERY      epidural    BREAST BIOPSY Right over 10 yrs ago    benign    COLONOSCOPY N/A 7/11/2016    Procedure: COLONOSCOPY;  Surgeon: Yordy Penn MD;  Location: [facility];  Service: Endoscopy;  Laterality: N/A;    KNEE ARTHROPLASTY      KNEE ARTHROSCOPY      bilaterly    TONSILLECTOMY         SHx: per the electronic medical record    FHx: recorded in the electronic medical record    ROS:    denies any chest pains or shortness of breath. Denies any nausea, vomiting or diarrhea. Denies any fever, chills or sweats. Denies any change in weight, voice, stool, skin or hair. Denies any dysuria, dyspepsia or dysphagia. Denies any change in vision, hearing or headaches. Denies any swollen lymph nodes or loss of memory.    PE:  BP (!) 140/80   Pulse 78   Temp 98.8 °F (37.1 °C)   Resp 16   Ht 5' 7" (1.702 m)   Wt 71.3 kg (157 lb 3 oz)   SpO2 " 98%   BMI 24.62 kg/m²   Gen: Well-developed, well-nourished, female, in no acute distress, oriented x3  HEENT: neck is supple, no adenopathy, carotids 2+ equal without bruits, thyroid exam normal size without nodules.  CHEST: clear to auscultation and percussion  CVS: regular rate and rhythm without significant murmur, gallop, or rubs  ABD: soft, benign, no rebound no guarding, no distention. Bowel sounds are normal.     Nontender,  no palpable masses, no organomegaly and no audible bruits.  BREAST: no masses.  No nipple inversion, retraction, or deviation.  EXT: no clubbing, cyanosis, or edema  LYMPH: no cervical, inguinal, or axillary adenopathy  FEET: no loss of sensation.  No ulcers or pressure sores.  NEURO: gait normal.  Cranial nerves II- XII intact. No nystagmus.  Speech normal.   Gross motor and sensory unremarkable.  PELVIC: deferred    Lab Results   Component Value Date    WBC 4.82 06/26/2018    HGB 11.3 (L) 06/26/2018    HCT 35.1 (L) 06/26/2018     06/26/2018    CHOL 176 06/26/2018    TRIG 109 06/26/2018    HDL 56 06/26/2018    ALT 14 06/26/2018    AST 18 06/26/2018     06/26/2018    K 4.3 06/26/2018     06/26/2018    CREATININE 0.8 06/26/2018    BUN 15 06/26/2018    CO2 28 06/26/2018    TSH 2.620 06/26/2018    INR 1.0 06/09/2011       Impression:  Multiple medical problems below, stable  Patient Active Problem List   Diagnosis    GERD (gastroesophageal reflux disease)    Hyperlipidemia    Depression    Insomnia    Migraine    Essential hypertension       Plan:   Orders Placed This Encounter    verapamil (VERELAN) 180 MG C24P    furosemide (LASIX) 40 MG tablet    sumatriptan (IMITREX) 100 MG tablet    zolpidem (AMBIEN) 10 mg Tab     She will signed release of information papers so I can get copies of the records from [facility].  Medications remain the same.  She will be seen again in 6 months

## 2018-07-17 ENCOUNTER — PATIENT MESSAGE (OUTPATIENT)
Dept: INTERNAL MEDICINE | Facility: CLINIC | Age: 78
End: 2018-07-17

## 2018-11-06 ENCOUNTER — HOSPITAL ENCOUNTER (OUTPATIENT)
Dept: RADIOLOGY | Facility: HOSPITAL | Age: 78
Discharge: HOME | End: 2018-11-06
Attending: NURSE PRACTITIONER
Payer: MEDICARE

## 2018-11-06 ENCOUNTER — OFFICE VISIT (OUTPATIENT)
Dept: INTERNAL MEDICINE | Facility: CLINIC | Age: 78
End: 2018-11-06
Payer: MEDICARE

## 2018-11-06 VITALS
RESPIRATION RATE: 16 BRPM | HEART RATE: 60 BPM | BODY MASS INDEX: 25.51 KG/M2 | TEMPERATURE: 98 F | HEIGHT: 66 IN | DIASTOLIC BLOOD PRESSURE: 72 MMHG | WEIGHT: 158.75 LBS | SYSTOLIC BLOOD PRESSURE: 112 MMHG

## 2018-11-06 DIAGNOSIS — R05.3 CHRONIC COUGH: ICD-10-CM

## 2018-11-06 DIAGNOSIS — R05.3 CHRONIC COUGH: Primary | ICD-10-CM

## 2018-11-06 PROCEDURE — 1101F PT FALLS ASSESS-DOCD LE1/YR: CPT | Mod: CPTII,HCNC,S$GLB, | Performed by: NURSE PRACTITIONER

## 2018-11-06 PROCEDURE — 71046 X-RAY EXAM CHEST 2 VIEWS: CPT | Mod: 26,HCNC,, | Performed by: RADIOLOGY

## 2018-11-06 PROCEDURE — 99214 OFFICE O/P EST MOD 30 MIN: CPT | Mod: HCNC,S$GLB,, | Performed by: NURSE PRACTITIONER

## 2018-11-06 PROCEDURE — 3074F SYST BP LT 130 MM HG: CPT | Mod: CPTII,HCNC,S$GLB, | Performed by: NURSE PRACTITIONER

## 2018-11-06 PROCEDURE — 99999 PR PBB SHADOW E&M-EST. PATIENT-LVL IV: CPT | Mod: PBBFAC,HCNC,, | Performed by: NURSE PRACTITIONER

## 2018-11-06 PROCEDURE — 3078F DIAST BP <80 MM HG: CPT | Mod: CPTII,HCNC,S$GLB, | Performed by: NURSE PRACTITIONER

## 2018-11-06 PROCEDURE — 71046 X-RAY EXAM CHEST 2 VIEWS: CPT | Mod: TC,FY,HCNC,PO

## 2018-11-06 RX ORDER — DOXYCYCLINE 100 MG/1
100 CAPSULE ORAL EVERY 12 HOURS
Qty: 20 CAPSULE | Refills: 0 | Status: SHIPPED | OUTPATIENT
Start: 2018-11-06 | End: 2018-11-16

## 2018-11-06 RX ORDER — DIPHENHYDRAMINE HCL 25 MG
1 TABLET ORAL
COMMUNITY
Start: 2012-05-11 | End: 2022-08-30

## 2018-11-06 RX ORDER — GUAIFENESIN 600 MG/1
1200 TABLET, EXTENDED RELEASE ORAL 2 TIMES DAILY
Qty: 40 TABLET | Refills: 0 | Status: SHIPPED | OUTPATIENT
Start: 2018-11-06 | End: 2018-11-16

## 2018-11-06 NOTE — PROGRESS NOTES
"Subjective:       Patient ID: Migdalia Luna is a 78 y.o. female.    Chief Complaint: Cough    Cough   This is a new problem. The current episode started more than 1 year ago. The problem has been waxing and waning. The problem occurs every few hours. The cough is productive of sputum. Associated symptoms include shortness of breath and wheezing. Pertinent negatives include no chest pain, chills, ear congestion, ear pain, eye redness, fever, headaches, heartburn, hemoptysis, myalgias, nasal congestion, postnasal drip, rash, rhinorrhea, sore throat, sweats or weight loss. The symptoms are aggravated by exercise. She has tried a beta-agonist inhaler for the symptoms. The treatment provided no relief. Her past medical history is significant for bronchitis and environmental allergies. There is no history of asthma or bronchiectasis.       /72 (BP Location: Right arm, Patient Position: Sitting, BP Method: Medium (Automatic))   Pulse 60   Temp 97.7 °F (36.5 °C) (Tympanic)   Resp 16   Ht 5' 6" (1.676 m)   Wt 72 kg (158 lb 11.7 oz)   BMI 25.62 kg/m²     Review of Systems   Constitutional: Negative for activity change, appetite change, chills, diaphoresis, fatigue, fever, unexpected weight change and weight loss.   HENT: Negative for congestion, dental problem, drooling, ear discharge, ear pain, hearing loss, mouth sores, nosebleeds, postnasal drip, rhinorrhea, sinus pressure, sinus pain, sneezing, sore throat, tinnitus, trouble swallowing and voice change.    Eyes: Negative for pain, discharge and redness.   Respiratory: Positive for cough, shortness of breath and wheezing. Negative for apnea, hemoptysis, choking, chest tightness and stridor.    Cardiovascular: Negative for chest pain, palpitations and leg swelling.   Gastrointestinal: Negative for abdominal pain, diarrhea, heartburn, nausea and vomiting.   Endocrine: Negative for cold intolerance and heat intolerance.   Genitourinary: Negative for dysuria. "   Musculoskeletal: Negative for arthralgias, joint swelling, myalgias and neck pain.   Skin: Negative for rash.   Allergic/Immunologic: Positive for environmental allergies. Negative for food allergies and immunocompromised state.   Neurological: Negative for syncope, light-headedness and headaches.       Objective:      Physical Exam   Constitutional: She is oriented to person, place, and time. She appears well-developed and well-nourished. No distress.   HENT:   Head: Normocephalic and atraumatic.   Right Ear: Tympanic membrane, external ear and ear canal normal.   Left Ear: Tympanic membrane, external ear and ear canal normal.   Nose: Nose normal. No mucosal edema or rhinorrhea. Right sinus exhibits no maxillary sinus tenderness and no frontal sinus tenderness. Left sinus exhibits no maxillary sinus tenderness and no frontal sinus tenderness.   Mouth/Throat: Uvula is midline, oropharynx is clear and moist and mucous membranes are normal. No oropharyngeal exudate, posterior oropharyngeal edema or posterior oropharyngeal erythema.   Eyes: Conjunctivae are normal. Right eye exhibits no discharge. Left eye exhibits no discharge.   Neck: Normal range of motion.   Cardiovascular: Normal rate, regular rhythm and normal heart sounds.   No murmur heard.  Pulmonary/Chest: Effort normal and breath sounds normal. No accessory muscle usage or stridor. No respiratory distress. She has no decreased breath sounds. She has no wheezes. She has no rhonchi. She has no rales. She exhibits no tenderness.   Frequent cough with throat clearing   Abdominal: Soft. She exhibits no distension.   Musculoskeletal: Normal range of motion.   Neurological: She is alert and oriented to person, place, and time.   Skin: Skin is warm and dry. She is not diaphoretic.   Psychiatric: She has a normal mood and affect. Her behavior is normal.   Nursing note and vitals reviewed.      Assessment:       1. Chronic cough        Plan:       Migdalia was seen  today for cough.    Diagnoses and all orders for this visit:    Chronic cough  -     X-Ray Chest PA And Lateral; Future  -     doxycycline (VIBRAMYCIN) 100 MG Cap; Take 1 capsule (100 mg total) by mouth every 12 (twelve) hours. for 10 days  -     guaiFENesin (MUCINEX) 600 mg 12 hr tablet; Take 2 tablets (1,200 mg total) by mouth 2 (two) times daily. for 10 days    likely will need pulmonology referral.  Patient wants to hold off at this time and try antibiotics 1st.  She has nebulizer to use p.r.n. but states it does not work.  Push fluids  If symptoms worsen or fail to improve with treatment, see your Primary Care Provider or go to the nearest Emergency Room.

## 2018-11-29 ENCOUNTER — PATIENT MESSAGE (OUTPATIENT)
Dept: INTERNAL MEDICINE | Facility: CLINIC | Age: 78
End: 2018-11-29

## 2018-11-29 RX ORDER — AMLODIPINE BESYLATE 5 MG/1
5 TABLET ORAL DAILY
Qty: 90 TABLET | Refills: 3 | Status: SHIPPED | OUTPATIENT
Start: 2018-11-29 | End: 2019-12-28

## 2018-12-18 ENCOUNTER — PATIENT MESSAGE (OUTPATIENT)
Dept: INTERNAL MEDICINE | Facility: CLINIC | Age: 78
End: 2018-12-18

## 2018-12-18 RX ORDER — ZOLPIDEM TARTRATE 10 MG/1
TABLET ORAL
Qty: 90 TABLET | Refills: 1 | Status: SHIPPED | OUTPATIENT
Start: 2018-12-18 | End: 2019-01-09 | Stop reason: SDUPTHER

## 2019-01-09 ENCOUNTER — OFFICE VISIT (OUTPATIENT)
Dept: INTERNAL MEDICINE | Facility: CLINIC | Age: 79
End: 2019-01-09
Payer: MEDICARE

## 2019-01-09 VITALS
SYSTOLIC BLOOD PRESSURE: 114 MMHG | HEART RATE: 90 BPM | WEIGHT: 157.88 LBS | BODY MASS INDEX: 25.48 KG/M2 | OXYGEN SATURATION: 94 % | DIASTOLIC BLOOD PRESSURE: 82 MMHG | TEMPERATURE: 98 F

## 2019-01-09 DIAGNOSIS — K21.9 GASTROESOPHAGEAL REFLUX DISEASE WITHOUT ESOPHAGITIS: ICD-10-CM

## 2019-01-09 DIAGNOSIS — E78.5 HYPERLIPIDEMIA, UNSPECIFIED HYPERLIPIDEMIA TYPE: Primary | ICD-10-CM

## 2019-01-09 DIAGNOSIS — F32.A DEPRESSION, UNSPECIFIED DEPRESSION TYPE: ICD-10-CM

## 2019-01-09 DIAGNOSIS — Z12.39 SCREENING BREAST EXAMINATION: ICD-10-CM

## 2019-01-09 DIAGNOSIS — I10 ESSENTIAL HYPERTENSION: ICD-10-CM

## 2019-01-09 DIAGNOSIS — G43.109 MIGRAINE WITH AURA AND WITHOUT STATUS MIGRAINOSUS, NOT INTRACTABLE: ICD-10-CM

## 2019-01-09 PROCEDURE — 1101F PT FALLS ASSESS-DOCD LE1/YR: CPT | Mod: CPTII,HCNC,S$GLB, | Performed by: INTERNAL MEDICINE

## 2019-01-09 PROCEDURE — 99213 OFFICE O/P EST LOW 20 MIN: CPT | Mod: HCNC,S$GLB,, | Performed by: INTERNAL MEDICINE

## 2019-01-09 PROCEDURE — 3074F PR MOST RECENT SYSTOLIC BLOOD PRESSURE < 130 MM HG: ICD-10-PCS | Mod: CPTII,HCNC,S$GLB, | Performed by: INTERNAL MEDICINE

## 2019-01-09 PROCEDURE — 3074F SYST BP LT 130 MM HG: CPT | Mod: CPTII,HCNC,S$GLB, | Performed by: INTERNAL MEDICINE

## 2019-01-09 PROCEDURE — 1101F PR PT FALLS ASSESS DOC 0-1 FALLS W/OUT INJ PAST YR: ICD-10-PCS | Mod: CPTII,HCNC,S$GLB, | Performed by: INTERNAL MEDICINE

## 2019-01-09 PROCEDURE — 99999 PR PBB SHADOW E&M-EST. PATIENT-LVL III: CPT | Mod: PBBFAC,HCNC,, | Performed by: INTERNAL MEDICINE

## 2019-01-09 PROCEDURE — 99213 PR OFFICE/OUTPT VISIT, EST, LEVL III, 20-29 MIN: ICD-10-PCS | Mod: HCNC,S$GLB,, | Performed by: INTERNAL MEDICINE

## 2019-01-09 PROCEDURE — 3079F PR MOST RECENT DIASTOLIC BLOOD PRESSURE 80-89 MM HG: ICD-10-PCS | Mod: CPTII,HCNC,S$GLB, | Performed by: INTERNAL MEDICINE

## 2019-01-09 PROCEDURE — 99999 PR PBB SHADOW E&M-EST. PATIENT-LVL III: ICD-10-PCS | Mod: PBBFAC,HCNC,, | Performed by: INTERNAL MEDICINE

## 2019-01-09 PROCEDURE — 3079F DIAST BP 80-89 MM HG: CPT | Mod: CPTII,HCNC,S$GLB, | Performed by: INTERNAL MEDICINE

## 2019-01-09 RX ORDER — ZOLPIDEM TARTRATE 10 MG/1
TABLET ORAL
Qty: 90 TABLET | Refills: 1 | Status: SHIPPED | OUTPATIENT
Start: 2019-01-09 | End: 2019-07-08 | Stop reason: SDUPTHER

## 2019-01-09 NOTE — PROGRESS NOTES
HPI:  Patient is a 78-year-old female who comes in today for follow-up of her hypertension.  She has been doing well.  She reports no problems or complaints.  Her blood pressure has been well controlled.    Current meds have been verified and updated per the EMR  Exam:/82   Pulse 90   Temp 98 °F (36.7 °C) (Tympanic)   Wt 71.6 kg (157 lb 13.6 oz)   SpO2 (!) 94%   BMI 25.48 kg/m²    Exam deferred    Lab Results   Component Value Date    WBC 4.82 06/26/2018    HGB 11.3 (L) 06/26/2018    HCT 35.1 (L) 06/26/2018     06/26/2018    CHOL 176 06/26/2018    TRIG 109 06/26/2018    HDL 56 06/26/2018    ALT 14 06/26/2018    AST 18 06/26/2018     06/26/2018    K 4.3 06/26/2018     06/26/2018    CREATININE 0.8 06/26/2018    BUN 15 06/26/2018    CO2 28 06/26/2018    TSH 2.620 06/26/2018    INR 1.0 06/09/2011       Impression:  Multiple medical problems below, stable  Patient Active Problem List   Diagnosis    GERD (gastroesophageal reflux disease)    Hyperlipidemia    Depression    Insomnia    Migraine    Essential hypertension       Plan:  Orders Placed This Encounter    Mammo Digital Screening Bilat    Comprehensive metabolic panel    Lipid panel    TSH    CBC auto differential    zolpidem (AMBIEN) 10 mg Tab     Her medications remain the same she.  She will be seen again in 6 months with above lab work.

## 2019-01-11 ENCOUNTER — PATIENT MESSAGE (OUTPATIENT)
Dept: INTERNAL MEDICINE | Facility: CLINIC | Age: 79
End: 2019-01-11

## 2019-01-14 ENCOUNTER — TELEPHONE (OUTPATIENT)
Dept: INTERNAL MEDICINE | Facility: CLINIC | Age: 79
End: 2019-01-14

## 2019-01-14 NOTE — TELEPHONE ENCOUNTER
Spoke with patient regarding preauth denial. Patient will call or message office back later this morning once she can access the denial email with contact info.

## 2019-02-02 ENCOUNTER — PATIENT MESSAGE (OUTPATIENT)
Dept: INTERNAL MEDICINE | Facility: CLINIC | Age: 79
End: 2019-02-02

## 2019-02-12 RX ORDER — ZOLPIDEM TARTRATE 10 MG/1
TABLET ORAL
Qty: 90 TABLET | Refills: 1 | Status: CANCELLED | OUTPATIENT
Start: 2019-02-12

## 2019-02-12 NOTE — TELEPHONE ENCOUNTER
Call pt and find out where she wants to get the ambien from. [facility] pharmacy will not fill 90 day supply

## 2019-02-18 ENCOUNTER — PATIENT MESSAGE (OUTPATIENT)
Dept: INTERNAL MEDICINE | Facility: CLINIC | Age: 79
End: 2019-02-18

## 2019-02-24 ENCOUNTER — PATIENT MESSAGE (OUTPATIENT)
Dept: INTERNAL MEDICINE | Facility: CLINIC | Age: 79
End: 2019-02-24

## 2019-02-26 ENCOUNTER — TELEPHONE (OUTPATIENT)
Dept: INTERNAL MEDICINE | Facility: CLINIC | Age: 79
End: 2019-02-26

## 2019-02-26 NOTE — TELEPHONE ENCOUNTER
Called pt. To schedule appt. She states she is very busy person, do not schedule Dr. Pollock appt. Without consulting her....

## 2019-02-26 NOTE — TELEPHONE ENCOUNTER
Pt has an appt on 7/26/2019 s. She would like to schedule her HRA with [facility] on the same day . Sabra Advise ,

## 2019-07-06 ENCOUNTER — PATIENT MESSAGE (OUTPATIENT)
Dept: INTERNAL MEDICINE | Facility: CLINIC | Age: 79
End: 2019-07-06

## 2019-07-06 DIAGNOSIS — G43.909 MIGRAINE WITHOUT STATUS MIGRAINOSUS, NOT INTRACTABLE, UNSPECIFIED MIGRAINE TYPE: ICD-10-CM

## 2019-07-08 RX ORDER — ZOLPIDEM TARTRATE 10 MG/1
TABLET ORAL
Qty: 90 TABLET | Refills: 1 | Status: SHIPPED | OUTPATIENT
Start: 2019-07-08 | End: 2019-12-28

## 2019-07-08 RX ORDER — TRAZODONE HYDROCHLORIDE 50 MG/1
50 TABLET ORAL NIGHTLY
Qty: 90 TABLET | Refills: 0 | Status: SHIPPED | OUTPATIENT
Start: 2019-07-08 | End: 2021-07-07 | Stop reason: SDUPTHER

## 2019-07-08 RX ORDER — SUMATRIPTAN SUCCINATE 100 MG/1
TABLET ORAL
Qty: 27 TABLET | Refills: 3 | Status: SHIPPED | OUTPATIENT
Start: 2019-07-08 | End: 2020-11-17

## 2019-07-08 RX ORDER — FUROSEMIDE 40 MG/1
40 TABLET ORAL
Qty: 90 TABLET | Refills: 0 | Status: SHIPPED | OUTPATIENT
Start: 2019-07-08 | End: 2019-07-09 | Stop reason: SDUPTHER

## 2019-07-09 RX ORDER — FUROSEMIDE 40 MG/1
40 TABLET ORAL DAILY
Qty: 90 TABLET | Refills: 1 | Status: SHIPPED | OUTPATIENT
Start: 2019-07-09 | End: 2019-12-28

## 2019-07-11 ENCOUNTER — PATIENT MESSAGE (OUTPATIENT)
Dept: INTERNAL MEDICINE | Facility: CLINIC | Age: 79
End: 2019-07-11

## 2019-07-11 ENCOUNTER — TELEPHONE (OUTPATIENT)
Dept: INTERNAL MEDICINE | Facility: CLINIC | Age: 79
End: 2019-07-11

## 2019-08-20 ENCOUNTER — HOSPITAL ENCOUNTER (OUTPATIENT)
Dept: RADIOLOGY | Facility: HOSPITAL | Age: 79
Discharge: HOME | End: 2019-08-20
Attending: INTERNAL MEDICINE
Payer: MEDICARE

## 2019-08-20 VITALS — WEIGHT: 157 LBS | HEIGHT: 66 IN | BODY MASS INDEX: 25.23 KG/M2

## 2019-08-20 DIAGNOSIS — Z12.39 SCREENING BREAST EXAMINATION: ICD-10-CM

## 2019-08-20 PROCEDURE — 77063 MAMMO DIGITAL SCREENING BILAT WITH TOMOSYNTHESIS_CAD: ICD-10-PCS | Mod: 26,HCNC,, | Performed by: RADIOLOGY

## 2019-08-20 PROCEDURE — 77067 SCR MAMMO BI INCL CAD: CPT | Mod: TC,HCNC

## 2019-08-20 PROCEDURE — 77067 SCR MAMMO BI INCL CAD: CPT | Mod: 26,HCNC,, | Performed by: RADIOLOGY

## 2019-08-20 PROCEDURE — 77063 BREAST TOMOSYNTHESIS BI: CPT | Mod: 26,HCNC,, | Performed by: RADIOLOGY

## 2019-08-20 PROCEDURE — 77067 MAMMO DIGITAL SCREENING BILAT WITH TOMOSYNTHESIS_CAD: ICD-10-PCS | Mod: 26,HCNC,, | Performed by: RADIOLOGY

## 2019-09-11 ENCOUNTER — PES CALL (OUTPATIENT)
Dept: ADMINISTRATIVE | Facility: CLINIC | Age: 79
End: 2019-09-11

## 2019-09-12 ENCOUNTER — PES CALL (OUTPATIENT)
Dept: ADMINISTRATIVE | Facility: CLINIC | Age: 79
End: 2019-09-12

## 2019-09-18 ENCOUNTER — PES CALL (OUTPATIENT)
Dept: ADMINISTRATIVE | Facility: CLINIC | Age: 79
End: 2019-09-18

## 2019-12-09 ENCOUNTER — NURSE TRIAGE (OUTPATIENT)
Dept: ADMINISTRATIVE | Facility: CLINIC | Age: 79
End: 2019-12-09

## 2019-12-09 ENCOUNTER — HOSPITAL ENCOUNTER (OUTPATIENT)
Dept: RADIOLOGY | Facility: HOSPITAL | Age: 79
Discharge: HOME | End: 2019-12-09
Attending: NURSE PRACTITIONER
Payer: MEDICARE

## 2019-12-09 ENCOUNTER — OFFICE VISIT (OUTPATIENT)
Dept: INTERNAL MEDICINE | Facility: CLINIC | Age: 79
End: 2019-12-09
Payer: MEDICARE

## 2019-12-09 VITALS
HEIGHT: 68 IN | TEMPERATURE: 100 F | HEART RATE: 94 BPM | SYSTOLIC BLOOD PRESSURE: 100 MMHG | DIASTOLIC BLOOD PRESSURE: 60 MMHG | WEIGHT: 148.56 LBS | BODY MASS INDEX: 22.52 KG/M2

## 2019-12-09 DIAGNOSIS — K21.9 GASTROESOPHAGEAL REFLUX DISEASE WITHOUT ESOPHAGITIS: ICD-10-CM

## 2019-12-09 DIAGNOSIS — R05.9 COUGH: ICD-10-CM

## 2019-12-09 DIAGNOSIS — I10 ESSENTIAL HYPERTENSION: ICD-10-CM

## 2019-12-09 DIAGNOSIS — R05.9 COUGH: Primary | ICD-10-CM

## 2019-12-09 DIAGNOSIS — R50.9 FEVER, UNSPECIFIED FEVER CAUSE: ICD-10-CM

## 2019-12-09 DIAGNOSIS — K44.9 HIATAL HERNIA: ICD-10-CM

## 2019-12-09 LAB
INFLUENZA A, MOLECULAR: NEGATIVE
INFLUENZA B, MOLECULAR: NEGATIVE
SPECIMEN SOURCE: NORMAL

## 2019-12-09 PROCEDURE — 1126F PR PAIN SEVERITY QUANTIFIED, NO PAIN PRESENT: ICD-10-PCS | Mod: HCNC,S$GLB,, | Performed by: NURSE PRACTITIONER

## 2019-12-09 PROCEDURE — 99999 PR PBB SHADOW E&M-EST. PATIENT-LVL III: CPT | Mod: PBBFAC,HCNC,, | Performed by: NURSE PRACTITIONER

## 2019-12-09 PROCEDURE — 3078F DIAST BP <80 MM HG: CPT | Mod: HCNC,CPTII,S$GLB, | Performed by: NURSE PRACTITIONER

## 2019-12-09 PROCEDURE — 71046 X-RAY EXAM CHEST 2 VIEWS: CPT | Mod: TC,HCNC

## 2019-12-09 PROCEDURE — 3074F PR MOST RECENT SYSTOLIC BLOOD PRESSURE < 130 MM HG: ICD-10-PCS | Mod: HCNC,CPTII,S$GLB, | Performed by: NURSE PRACTITIONER

## 2019-12-09 PROCEDURE — 1159F MED LIST DOCD IN RCRD: CPT | Mod: HCNC,S$GLB,, | Performed by: NURSE PRACTITIONER

## 2019-12-09 PROCEDURE — 1126F AMNT PAIN NOTED NONE PRSNT: CPT | Mod: HCNC,S$GLB,, | Performed by: NURSE PRACTITIONER

## 2019-12-09 PROCEDURE — 87502 INFLUENZA DNA AMP PROBE: CPT | Mod: HCNC

## 2019-12-09 PROCEDURE — 71046 X-RAY EXAM CHEST 2 VIEWS: CPT | Mod: 26,HCNC,, | Performed by: RADIOLOGY

## 2019-12-09 PROCEDURE — 99214 OFFICE O/P EST MOD 30 MIN: CPT | Mod: HCNC,S$GLB,, | Performed by: NURSE PRACTITIONER

## 2019-12-09 PROCEDURE — 99999 PR PBB SHADOW E&M-EST. PATIENT-LVL III: ICD-10-PCS | Mod: PBBFAC,HCNC,, | Performed by: NURSE PRACTITIONER

## 2019-12-09 PROCEDURE — 71046 XR CHEST PA AND LATERAL: ICD-10-PCS | Mod: 26,HCNC,, | Performed by: RADIOLOGY

## 2019-12-09 PROCEDURE — 3078F PR MOST RECENT DIASTOLIC BLOOD PRESSURE < 80 MM HG: ICD-10-PCS | Mod: HCNC,CPTII,S$GLB, | Performed by: NURSE PRACTITIONER

## 2019-12-09 PROCEDURE — 3074F SYST BP LT 130 MM HG: CPT | Mod: HCNC,CPTII,S$GLB, | Performed by: NURSE PRACTITIONER

## 2019-12-09 PROCEDURE — 1159F PR MEDICATION LIST DOCUMENTED IN MEDICAL RECORD: ICD-10-PCS | Mod: HCNC,S$GLB,, | Performed by: NURSE PRACTITIONER

## 2019-12-09 PROCEDURE — 99214 PR OFFICE/OUTPT VISIT, EST, LEVL IV, 30-39 MIN: ICD-10-PCS | Mod: HCNC,S$GLB,, | Performed by: NURSE PRACTITIONER

## 2019-12-09 RX ORDER — DOXYCYCLINE 100 MG/1
100 CAPSULE ORAL 2 TIMES DAILY
Qty: 20 CAPSULE | Refills: 0 | Status: SHIPPED | OUTPATIENT
Start: 2019-12-09 | End: 2019-12-19

## 2019-12-09 RX ORDER — BENZONATATE 200 MG/1
200 CAPSULE ORAL 3 TIMES DAILY PRN
Qty: 20 CAPSULE | Refills: 0 | Status: SHIPPED | OUTPATIENT
Start: 2019-12-09 | End: 2019-12-19

## 2019-12-09 RX ORDER — METHYLPREDNISOLONE 4 MG/1
TABLET ORAL
Qty: 1 PACKAGE | Refills: 0 | Status: SHIPPED | OUTPATIENT
Start: 2019-12-09 | End: 2019-12-30

## 2019-12-09 NOTE — TELEPHONE ENCOUNTER
"Daughter is calling because she is confused, she states pt had a dr appointment today but she is unaware or the disposition, she states pt had been running a fever of 102 "something", advised her per notes, she was diagnosed with bronchitis and 3 medications were sent to her pharmacy, advised her to take pt to UC or ER for any worsening symptoms, caller agreed  "

## 2019-12-09 NOTE — PROGRESS NOTES
Subjective:       Patient ID: Migdalia Luna is a 79 y.o. female.    Chief Complaint: Cough and Fever    HPI      Pt reports cough x 3 months  Worse within last week  Reports hx of GERD and hiatal hernia- stopped taking PPI  Has not had flu shot  Fever x 3 days  No sob  Reports wheezing at night          Past Medical History:   Diagnosis Date    Anemia     Arthritis     left shoulder    Asthma     Chronic bronchitis     Depression     Essential hypertension     GERD (gastroesophageal reflux disease)     Hyperlipidemia     Insomnia     Migraine     Thyroid disease        Past Surgical History:   Procedure Laterality Date    BACK SURGERY      epidural    BREAST BIOPSY Right over 10 yrs ago    benign    COLONOSCOPY N/A 7/11/2016    Procedure: COLONOSCOPY;  Surgeon: Yordy Penn MD;  Location: [facility];  Service: Endoscopy;  Laterality: N/A;    KNEE ARTHROPLASTY      KNEE ARTHROSCOPY      bilaterly    TONSILLECTOMY       Social History     Socioeconomic History    Marital status:      Spouse name: Not on file    Number of children: Not on file    Years of education: Not on file    Highest education level: Not on file   Occupational History    Occupation: retired     Employer: OTHER   Social Needs    Financial resource strain: Not on file    Food insecurity:     Worry: Not on file     Inability: Not on file    Transportation needs:     Medical: Not on file     Non-medical: Not on file   Tobacco Use    Smoking status: Never Smoker    Smokeless tobacco: Never Used   Substance and Sexual Activity    Alcohol use: No    Drug use: No    Sexual activity: Not Currently   Lifestyle    Physical activity:     Days per week: Not on file     Minutes per session: Not on file    Stress: Not at all   Relationships    Social connections:     Talks on phone: Not on file     Gets together: Not on file     Attends Anabaptism service: Not on file     Active member of club or organization: Not  on file     Attends meetings of clubs or organizations: Not on file     Relationship status: Not on file   Other Topics Concern    Not on file   Social History Narrative    Patient is retired.     Review of patient's allergies indicates:   Allergen Reactions    Sulfa (sulfonamide antibiotics)      Other reaction(s): Unknown     Current Outpatient Medications   Medication Sig    amLODIPine (NORVASC) 5 MG tablet Take 1 tablet (5 mg total) by mouth once daily.    COCONUT OIL ORAL Take 1 tablet by mouth.    diphenhydrAMINE (SOMINEX) 25 mg tablet Take 1 tablet by mouth.    furosemide (LASIX) 40 MG tablet Take 1 tablet (40 mg total) by mouth once daily.    inulin 2 gram Chew Take 1 tablet by mouth.    sumatriptan (IMITREX) 100 MG tablet TAKE 1 TABLET with onset of Headache, may repeat once two hours later if needed    traZODone (DESYREL) 50 MG tablet Take 1 tablet (50 mg total) by mouth every evening.    TURMERIC-TURMERIC ROOT EXTRACT ORAL Take 1 tablet by mouth.    zolpidem (AMBIEN) 10 mg Tab TAKE 1 TABLET EVERY NIGHT AS NEEDED     No current facility-administered medications for this visit.            Review of Systems   Constitutional: Positive for fever. Negative for activity change, appetite change, chills, diaphoresis, fatigue and unexpected weight change.   HENT: Negative for congestion, ear pain, postnasal drip, rhinorrhea, sinus pressure, sinus pain, sneezing, sore throat, tinnitus, trouble swallowing and voice change.    Eyes: Negative for photophobia, pain and visual disturbance.   Respiratory: Positive for cough and wheezing. Negative for chest tightness and shortness of breath.    Cardiovascular: Negative for chest pain, palpitations and leg swelling.   Gastrointestinal: Negative for abdominal distention, abdominal pain, constipation, diarrhea, nausea and vomiting.   Genitourinary: Negative for decreased urine volume, difficulty urinating, dysuria, flank pain, frequency, hematuria and urgency.    Musculoskeletal: Negative for arthralgias, back pain, joint swelling, neck pain and neck stiffness.   Allergic/Immunologic: Negative for immunocompromised state.   Neurological: Positive for weakness. Negative for dizziness, tremors, seizures, syncope, facial asymmetry, speech difficulty, light-headedness, numbness and headaches.   Hematological: Negative for adenopathy. Does not bruise/bleed easily.   Psychiatric/Behavioral: Negative for confusion and sleep disturbance.       Objective:      Physical Exam   Constitutional: She is oriented to person, place, and time. She has a sickly appearance.   HENT:   Right Ear: Tympanic membrane normal.   Left Ear: Tympanic membrane normal.   Nose: Mucosal edema and rhinorrhea present.   Mouth/Throat: Uvula is midline and mucous membranes are normal. Posterior oropharyngeal erythema present.   Neck: Normal range of motion. Neck supple.   Cardiovascular: Normal rate, regular rhythm and normal heart sounds.   Pulmonary/Chest: Effort normal.   Diminished lung sounds   Abdominal: Soft. Bowel sounds are normal.   Neurological: She is alert and oriented to person, place, and time.   Skin: Skin is warm and dry.       Assessment:     Vitals:    12/09/19 1610   BP: 100/60   Pulse: 94   Temp: 100.1 °F (37.8 °C)         1. Cough    2. Fever, unspecified fever cause    3. Gastroesophageal reflux disease without esophagitis    4. Essential hypertension    5. Hiatal hernia        Plan:   Cough  -     X-Ray Chest PA And Lateral; Future; Expected date: 12/09/2019  -     Influenza A & B by Molecular    Fever, unspecified fever cause  -     X-Ray Chest PA And Lateral; Future; Expected date: 12/09/2019  -     Influenza A & B by Molecular    Gastroesophageal reflux disease without esophagitis    Essential hypertension    Hiatal hernia        As above  txment plan to follow  Supportive care discussed  Wanted to stat protonix, pt reports she has a PPI at home and will email me with what she has  -would like to consider restarting what she already has

## 2019-12-10 NOTE — TELEPHONE ENCOUNTER
Spoke with patient and she stated that she is feeling a little better, but she still has a fever and chills. Please Advise

## 2019-12-11 NOTE — TELEPHONE ENCOUNTER
Please call patient find out how high her fever has been.  Tell her if she is still having fever by Thursday morning I want to see her.

## 2019-12-12 ENCOUNTER — PATIENT MESSAGE (OUTPATIENT)
Dept: INTERNAL MEDICINE | Facility: CLINIC | Age: 79
End: 2019-12-12

## 2019-12-28 RX ORDER — ZOLPIDEM TARTRATE 10 MG/1
TABLET ORAL
Qty: 90 TABLET | Refills: 0 | Status: SHIPPED | OUTPATIENT
Start: 2019-12-28 | End: 2020-03-24 | Stop reason: SDUPTHER

## 2019-12-28 RX ORDER — FUROSEMIDE 40 MG/1
TABLET ORAL
Qty: 90 TABLET | Refills: 0 | Status: SHIPPED | OUTPATIENT
Start: 2019-12-28 | End: 2020-03-22

## 2019-12-28 RX ORDER — AMLODIPINE BESYLATE 5 MG/1
TABLET ORAL
Qty: 90 TABLET | Refills: 0 | Status: SHIPPED | OUTPATIENT
Start: 2019-12-28 | End: 2020-03-22

## 2019-12-28 NOTE — TELEPHONE ENCOUNTER
Call patient and schedule lab work ordered 01/09/2019 to be done this week and see me next week for physical exam

## 2020-01-10 ENCOUNTER — PATIENT MESSAGE (OUTPATIENT)
Dept: INTERNAL MEDICINE | Facility: CLINIC | Age: 80
End: 2020-01-10

## 2020-01-15 ENCOUNTER — LAB VISIT (OUTPATIENT)
Dept: LAB | Facility: HOSPITAL | Age: 80
End: 2020-01-15
Attending: INTERNAL MEDICINE
Payer: MEDICARE

## 2020-01-15 DIAGNOSIS — I10 ESSENTIAL HYPERTENSION: ICD-10-CM

## 2020-01-15 LAB
BASOPHILS # BLD AUTO: 0.04 K/UL (ref 0–0.2)
BASOPHILS NFR BLD: 0.6 % (ref 0–1.9)
DIFFERENTIAL METHOD: ABNORMAL
EOSINOPHIL # BLD AUTO: 0.2 K/UL (ref 0–0.5)
EOSINOPHIL NFR BLD: 3.3 % (ref 0–8)
ERYTHROCYTE [DISTWIDTH] IN BLOOD BY AUTOMATED COUNT: 16.2 % (ref 11.5–14.5)
HCT VFR BLD AUTO: 40 % (ref 37–48.5)
HGB BLD-MCNC: 12 G/DL (ref 12–16)
IMM GRANULOCYTES # BLD AUTO: 0.02 K/UL (ref 0–0.04)
IMM GRANULOCYTES NFR BLD AUTO: 0.3 % (ref 0–0.5)
LYMPHOCYTES # BLD AUTO: 1.7 K/UL (ref 1–4.8)
LYMPHOCYTES NFR BLD: 27.1 % (ref 18–48)
MCH RBC QN AUTO: 31.6 PG (ref 27–31)
MCHC RBC AUTO-ENTMCNC: 30 G/DL (ref 32–36)
MCV RBC AUTO: 105 FL (ref 82–98)
MONOCYTES # BLD AUTO: 0.8 K/UL (ref 0.3–1)
MONOCYTES NFR BLD: 11.7 % (ref 4–15)
NEUTROPHILS # BLD AUTO: 3.7 K/UL (ref 1.8–7.7)
NEUTROPHILS NFR BLD: 57 % (ref 38–73)
NRBC BLD-RTO: 0 /100 WBC
PLATELET # BLD AUTO: 365 K/UL (ref 150–350)
PMV BLD AUTO: 10.2 FL (ref 9.2–12.9)
RBC # BLD AUTO: 3.8 M/UL (ref 4–5.4)
WBC # BLD AUTO: 6.43 K/UL (ref 3.9–12.7)

## 2020-01-15 PROCEDURE — 84443 ASSAY THYROID STIM HORMONE: CPT | Mod: HCNC

## 2020-01-15 PROCEDURE — 80061 LIPID PANEL: CPT | Mod: HCNC

## 2020-01-15 PROCEDURE — 80053 COMPREHEN METABOLIC PANEL: CPT | Mod: HCNC

## 2020-01-15 PROCEDURE — 85025 COMPLETE CBC W/AUTO DIFF WBC: CPT | Mod: HCNC

## 2020-01-15 PROCEDURE — 36415 COLL VENOUS BLD VENIPUNCTURE: CPT | Mod: HCNC,PO

## 2020-01-16 LAB
ALBUMIN SERPL BCP-MCNC: 3.4 G/DL (ref 3.5–5.2)
ALP SERPL-CCNC: 84 U/L (ref 55–135)
ALT SERPL W/O P-5'-P-CCNC: 16 U/L (ref 10–44)
ANION GAP SERPL CALC-SCNC: 8 MMOL/L (ref 8–16)
AST SERPL-CCNC: 22 U/L (ref 10–40)
BILIRUB SERPL-MCNC: 0.3 MG/DL (ref 0.1–1)
BUN SERPL-MCNC: 13 MG/DL (ref 8–23)
CALCIUM SERPL-MCNC: 9.5 MG/DL (ref 8.7–10.5)
CHLORIDE SERPL-SCNC: 106 MMOL/L (ref 95–110)
CHOLEST SERPL-MCNC: 187 MG/DL (ref 120–199)
CHOLEST/HDLC SERPL: 2.9 {RATIO} (ref 2–5)
CO2 SERPL-SCNC: 26 MMOL/L (ref 23–29)
CREAT SERPL-MCNC: 0.8 MG/DL (ref 0.5–1.4)
EST. GFR  (AFRICAN AMERICAN): >60 ML/MIN/1.73 M^2
EST. GFR  (NON AFRICAN AMERICAN): >60 ML/MIN/1.73 M^2
GLUCOSE SERPL-MCNC: 65 MG/DL (ref 70–110)
HDLC SERPL-MCNC: 64 MG/DL (ref 40–75)
HDLC SERPL: 34.2 % (ref 20–50)
LDLC SERPL CALC-MCNC: 103 MG/DL (ref 63–159)
NONHDLC SERPL-MCNC: 123 MG/DL
POTASSIUM SERPL-SCNC: 3.8 MMOL/L (ref 3.5–5.1)
PROT SERPL-MCNC: 6.9 G/DL (ref 6–8.4)
SODIUM SERPL-SCNC: 140 MMOL/L (ref 136–145)
TRIGL SERPL-MCNC: 100 MG/DL (ref 30–150)
TSH SERPL DL<=0.005 MIU/L-ACNC: 3.2 UIU/ML (ref 0.4–4)

## 2020-01-22 ENCOUNTER — OFFICE VISIT (OUTPATIENT)
Dept: INTERNAL MEDICINE | Facility: CLINIC | Age: 80
End: 2020-01-22
Payer: MEDICARE

## 2020-01-22 VITALS
HEIGHT: 66 IN | BODY MASS INDEX: 23.99 KG/M2 | WEIGHT: 149.25 LBS | OXYGEN SATURATION: 99 % | DIASTOLIC BLOOD PRESSURE: 74 MMHG | TEMPERATURE: 99 F | HEART RATE: 98 BPM | SYSTOLIC BLOOD PRESSURE: 122 MMHG

## 2020-01-22 DIAGNOSIS — I10 ESSENTIAL HYPERTENSION: ICD-10-CM

## 2020-01-22 DIAGNOSIS — E78.5 HYPERLIPIDEMIA, UNSPECIFIED HYPERLIPIDEMIA TYPE: ICD-10-CM

## 2020-01-22 DIAGNOSIS — Z12.31 BREAST CANCER SCREENING BY MAMMOGRAM: ICD-10-CM

## 2020-01-22 DIAGNOSIS — G43.109 MIGRAINE WITH AURA AND WITHOUT STATUS MIGRAINOSUS, NOT INTRACTABLE: ICD-10-CM

## 2020-01-22 DIAGNOSIS — M81.0 POSTMENOPAUSAL BONE LOSS: ICD-10-CM

## 2020-01-22 DIAGNOSIS — K21.9 GASTROESOPHAGEAL REFLUX DISEASE WITHOUT ESOPHAGITIS: ICD-10-CM

## 2020-01-22 DIAGNOSIS — F32.A DEPRESSION, UNSPECIFIED DEPRESSION TYPE: ICD-10-CM

## 2020-01-22 DIAGNOSIS — Z86.010 HISTORY OF COLON POLYPS: ICD-10-CM

## 2020-01-22 DIAGNOSIS — Z00.00 ROUTINE GENERAL MEDICAL EXAMINATION AT A HEALTH CARE FACILITY: Primary | ICD-10-CM

## 2020-01-22 PROCEDURE — 3074F PR MOST RECENT SYSTOLIC BLOOD PRESSURE < 130 MM HG: ICD-10-PCS | Mod: HCNC,CPTII,S$GLB, | Performed by: INTERNAL MEDICINE

## 2020-01-22 PROCEDURE — 99397 PR PREVENTIVE VISIT,EST,65 & OVER: ICD-10-PCS | Mod: HCNC,S$GLB,, | Performed by: INTERNAL MEDICINE

## 2020-01-22 PROCEDURE — 3074F SYST BP LT 130 MM HG: CPT | Mod: HCNC,CPTII,S$GLB, | Performed by: INTERNAL MEDICINE

## 2020-01-22 PROCEDURE — 99397 PER PM REEVAL EST PAT 65+ YR: CPT | Mod: HCNC,S$GLB,, | Performed by: INTERNAL MEDICINE

## 2020-01-22 PROCEDURE — 99999 PR PBB SHADOW E&M-EST. PATIENT-LVL III: ICD-10-PCS | Mod: PBBFAC,HCNC,, | Performed by: INTERNAL MEDICINE

## 2020-01-22 PROCEDURE — 3078F PR MOST RECENT DIASTOLIC BLOOD PRESSURE < 80 MM HG: ICD-10-PCS | Mod: HCNC,CPTII,S$GLB, | Performed by: INTERNAL MEDICINE

## 2020-01-22 PROCEDURE — 99999 PR PBB SHADOW E&M-EST. PATIENT-LVL III: CPT | Mod: PBBFAC,HCNC,, | Performed by: INTERNAL MEDICINE

## 2020-01-22 PROCEDURE — 3078F DIAST BP <80 MM HG: CPT | Mod: HCNC,CPTII,S$GLB, | Performed by: INTERNAL MEDICINE

## 2020-03-22 RX ORDER — FUROSEMIDE 40 MG/1
TABLET ORAL
Qty: 90 TABLET | Refills: 0 | Status: SHIPPED | OUTPATIENT
Start: 2020-03-22 | End: 2020-11-17

## 2020-03-22 RX ORDER — AMLODIPINE BESYLATE 5 MG/1
TABLET ORAL
Qty: 90 TABLET | Refills: 1 | Status: SHIPPED | OUTPATIENT
Start: 2020-03-22 | End: 2022-08-30 | Stop reason: SDUPTHER

## 2020-03-24 ENCOUNTER — PATIENT MESSAGE (OUTPATIENT)
Dept: INTERNAL MEDICINE | Facility: CLINIC | Age: 80
End: 2020-03-24

## 2020-03-24 RX ORDER — ZOLPIDEM TARTRATE 10 MG/1
TABLET ORAL
Qty: 90 TABLET | Refills: 1 | Status: SHIPPED | OUTPATIENT
Start: 2020-03-24 | End: 2020-11-20 | Stop reason: SDUPTHER

## 2020-04-06 ENCOUNTER — PATIENT MESSAGE (OUTPATIENT)
Dept: INTERNAL MEDICINE | Facility: CLINIC | Age: 80
End: 2020-04-06

## 2020-04-06 RX ORDER — MELOXICAM 7.5 MG/1
7.5 TABLET ORAL DAILY
Qty: 90 TABLET | Refills: 3 | Status: SHIPPED | OUTPATIENT
Start: 2020-04-06 | End: 2020-09-22 | Stop reason: SDUPTHER

## 2020-09-15 ENCOUNTER — HOSPITAL ENCOUNTER (OUTPATIENT)
Dept: RADIOLOGY | Facility: HOSPITAL | Age: 80
Discharge: HOME | End: 2020-09-15
Attending: INTERNAL MEDICINE
Payer: MEDICARE

## 2020-09-15 VITALS — HEIGHT: 66 IN | BODY MASS INDEX: 23.99 KG/M2 | WEIGHT: 149.25 LBS

## 2020-09-15 DIAGNOSIS — Z12.31 BREAST CANCER SCREENING BY MAMMOGRAM: ICD-10-CM

## 2020-09-15 PROCEDURE — 77067 SCR MAMMO BI INCL CAD: CPT | Mod: TC,HCNC

## 2020-09-15 PROCEDURE — 77063 MAMMO DIGITAL SCREENING BILAT WITH TOMO: ICD-10-PCS | Mod: 26,HCNC,, | Performed by: RADIOLOGY

## 2020-09-15 PROCEDURE — 77063 BREAST TOMOSYNTHESIS BI: CPT | Mod: 26,HCNC,, | Performed by: RADIOLOGY

## 2020-09-15 PROCEDURE — 77067 MAMMO DIGITAL SCREENING BILAT WITH TOMO: ICD-10-PCS | Mod: 26,HCNC,, | Performed by: RADIOLOGY

## 2020-09-15 PROCEDURE — 77067 SCR MAMMO BI INCL CAD: CPT | Mod: 26,HCNC,, | Performed by: RADIOLOGY

## 2020-09-22 ENCOUNTER — PATIENT MESSAGE (OUTPATIENT)
Dept: INTERNAL MEDICINE | Facility: CLINIC | Age: 80
End: 2020-09-22

## 2020-09-22 RX ORDER — MELOXICAM 7.5 MG/1
7.5 TABLET ORAL 2 TIMES DAILY
Qty: 180 TABLET | Refills: 3 | Status: SHIPPED | OUTPATIENT
Start: 2020-09-22 | End: 2021-07-07 | Stop reason: SDUPTHER

## 2020-09-29 ENCOUNTER — APPOINTMENT (OUTPATIENT)
Dept: RADIOLOGY | Facility: HOSPITAL | Age: 80
End: 2020-09-29
Attending: INTERNAL MEDICINE
Payer: MEDICARE

## 2020-09-29 ENCOUNTER — PATIENT MESSAGE (OUTPATIENT)
Dept: OTHER | Facility: OTHER | Age: 80
End: 2020-09-29

## 2020-09-29 DIAGNOSIS — M81.0 POSTMENOPAUSAL BONE LOSS: ICD-10-CM

## 2020-09-29 PROCEDURE — 77080 DXA BONE DENSITY AXIAL: CPT | Mod: 26,HCNC,, | Performed by: RADIOLOGY

## 2020-09-29 PROCEDURE — 77080 DEXA BONE DENSITY SPINE HIP: ICD-10-PCS | Mod: 26,HCNC,, | Performed by: RADIOLOGY

## 2020-09-29 PROCEDURE — 77080 DXA BONE DENSITY AXIAL: CPT | Mod: TC,HCNC

## 2020-11-19 RX ORDER — ZOLPIDEM TARTRATE 10 MG/1
TABLET ORAL
Qty: 90 TABLET | Refills: 1 | OUTPATIENT
Start: 2020-11-19

## 2020-11-20 ENCOUNTER — PATIENT MESSAGE (OUTPATIENT)
Dept: INTERNAL MEDICINE | Facility: CLINIC | Age: 80
End: 2020-11-20

## 2020-11-20 RX ORDER — ZOLPIDEM TARTRATE 10 MG/1
TABLET ORAL
Qty: 90 TABLET | Refills: 1 | Status: SHIPPED | OUTPATIENT
Start: 2020-11-20 | End: 2021-07-07 | Stop reason: SDUPTHER

## 2020-12-11 ENCOUNTER — PATIENT MESSAGE (OUTPATIENT)
Dept: OTHER | Facility: OTHER | Age: 80
End: 2020-12-11

## 2021-02-03 ENCOUNTER — PATIENT MESSAGE (OUTPATIENT)
Dept: INTERNAL MEDICINE | Facility: CLINIC | Age: 81
End: 2021-02-03

## 2021-03-08 ENCOUNTER — PES CALL (OUTPATIENT)
Dept: ADMINISTRATIVE | Facility: CLINIC | Age: 81
End: 2021-03-08

## 2021-03-11 ENCOUNTER — PATIENT OUTREACH (OUTPATIENT)
Dept: ADMINISTRATIVE | Facility: HOSPITAL | Age: 81
End: 2021-03-11

## 2021-03-25 ENCOUNTER — PATIENT MESSAGE (OUTPATIENT)
Dept: ADMINISTRATIVE | Facility: HOSPITAL | Age: 81
End: 2021-03-25

## 2021-03-31 ENCOUNTER — TELEPHONE (OUTPATIENT)
Dept: ADMINISTRATIVE | Facility: HOSPITAL | Age: 81
End: 2021-03-31

## 2021-04-28 ENCOUNTER — PATIENT MESSAGE (OUTPATIENT)
Dept: RESEARCH | Facility: HOSPITAL | Age: 81
End: 2021-04-28

## 2021-05-04 ENCOUNTER — PATIENT MESSAGE (OUTPATIENT)
Dept: ADMINISTRATIVE | Facility: HOSPITAL | Age: 81
End: 2021-05-04

## 2021-05-05 ENCOUNTER — TELEPHONE (OUTPATIENT)
Dept: INTERNAL MEDICINE | Facility: CLINIC | Age: 81
End: 2021-05-05

## 2021-05-05 DIAGNOSIS — I10 ESSENTIAL HYPERTENSION: ICD-10-CM

## 2021-05-05 DIAGNOSIS — E78.5 HYPERLIPIDEMIA, UNSPECIFIED HYPERLIPIDEMIA TYPE: Primary | ICD-10-CM

## 2021-05-16 ENCOUNTER — PATIENT MESSAGE (OUTPATIENT)
Dept: ADMINISTRATIVE | Facility: HOSPITAL | Age: 81
End: 2021-05-16

## 2021-06-22 ENCOUNTER — LAB VISIT (OUTPATIENT)
Dept: LAB | Facility: HOSPITAL | Age: 81
End: 2021-06-22
Attending: INTERNAL MEDICINE
Payer: MEDICARE

## 2021-06-22 DIAGNOSIS — I10 ESSENTIAL HYPERTENSION: ICD-10-CM

## 2021-06-22 DIAGNOSIS — E78.5 HYPERLIPIDEMIA, UNSPECIFIED HYPERLIPIDEMIA TYPE: ICD-10-CM

## 2021-06-22 LAB
ALBUMIN SERPL BCP-MCNC: 3.6 G/DL (ref 3.5–5.2)
ALP SERPL-CCNC: 66 U/L (ref 55–135)
ALT SERPL W/O P-5'-P-CCNC: 13 U/L (ref 10–44)
ANION GAP SERPL CALC-SCNC: 14 MMOL/L (ref 8–16)
AST SERPL-CCNC: 20 U/L (ref 10–40)
BILIRUB SERPL-MCNC: 0.3 MG/DL (ref 0.1–1)
BUN SERPL-MCNC: 18 MG/DL (ref 8–23)
CALCIUM SERPL-MCNC: 9.9 MG/DL (ref 8.7–10.5)
CHLORIDE SERPL-SCNC: 107 MMOL/L (ref 95–110)
CHOLEST SERPL-MCNC: 175 MG/DL (ref 120–199)
CHOLEST/HDLC SERPL: 3.2 {RATIO} (ref 2–5)
CO2 SERPL-SCNC: 22 MMOL/L (ref 23–29)
CREAT SERPL-MCNC: 1.2 MG/DL (ref 0.5–1.4)
EST. GFR  (AFRICAN AMERICAN): 49.7 ML/MIN/1.73 M^2
EST. GFR  (NON AFRICAN AMERICAN): 43.1 ML/MIN/1.73 M^2
GLUCOSE SERPL-MCNC: 89 MG/DL (ref 70–110)
HDLC SERPL-MCNC: 55 MG/DL (ref 40–75)
HDLC SERPL: 31.4 % (ref 20–50)
LDLC SERPL CALC-MCNC: 103 MG/DL (ref 63–159)
NONHDLC SERPL-MCNC: 120 MG/DL
POTASSIUM SERPL-SCNC: 3.6 MMOL/L (ref 3.5–5.1)
PROT SERPL-MCNC: 6.7 G/DL (ref 6–8.4)
SODIUM SERPL-SCNC: 143 MMOL/L (ref 136–145)
TRIGL SERPL-MCNC: 85 MG/DL (ref 30–150)

## 2021-06-22 PROCEDURE — 80053 COMPREHEN METABOLIC PANEL: CPT | Performed by: INTERNAL MEDICINE

## 2021-06-22 PROCEDURE — 36415 COLL VENOUS BLD VENIPUNCTURE: CPT | Mod: PO | Performed by: INTERNAL MEDICINE

## 2021-06-22 PROCEDURE — 80061 LIPID PANEL: CPT | Performed by: INTERNAL MEDICINE

## 2021-06-29 ENCOUNTER — PATIENT MESSAGE (OUTPATIENT)
Dept: INTERNAL MEDICINE | Facility: CLINIC | Age: 81
End: 2021-06-29

## 2021-07-07 ENCOUNTER — OFFICE VISIT (OUTPATIENT)
Dept: INTERNAL MEDICINE | Facility: CLINIC | Age: 81
End: 2021-07-07
Payer: MEDICARE

## 2021-07-07 ENCOUNTER — TELEPHONE (OUTPATIENT)
Dept: INTERNAL MEDICINE | Facility: CLINIC | Age: 81
End: 2021-07-07

## 2021-07-07 VITALS
DIASTOLIC BLOOD PRESSURE: 86 MMHG | HEIGHT: 65 IN | BODY MASS INDEX: 25.01 KG/M2 | RESPIRATION RATE: 20 BRPM | TEMPERATURE: 98 F | OXYGEN SATURATION: 98 % | WEIGHT: 150.13 LBS | SYSTOLIC BLOOD PRESSURE: 120 MMHG | HEART RATE: 96 BPM

## 2021-07-07 DIAGNOSIS — F32.A DEPRESSION, UNSPECIFIED DEPRESSION TYPE: ICD-10-CM

## 2021-07-07 DIAGNOSIS — Z12.31 BREAST CANCER SCREENING BY MAMMOGRAM: ICD-10-CM

## 2021-07-07 DIAGNOSIS — Z12.11 COLON CANCER SCREENING: ICD-10-CM

## 2021-07-07 DIAGNOSIS — I10 ESSENTIAL HYPERTENSION: ICD-10-CM

## 2021-07-07 DIAGNOSIS — E78.5 HYPERLIPIDEMIA, UNSPECIFIED HYPERLIPIDEMIA TYPE: ICD-10-CM

## 2021-07-07 DIAGNOSIS — K21.9 GASTROESOPHAGEAL REFLUX DISEASE WITHOUT ESOPHAGITIS: ICD-10-CM

## 2021-07-07 DIAGNOSIS — Z86.010 HISTORY OF COLON POLYPS: ICD-10-CM

## 2021-07-07 DIAGNOSIS — Z00.00 ROUTINE GENERAL MEDICAL EXAMINATION AT A HEALTH CARE FACILITY: Primary | ICD-10-CM

## 2021-07-07 DIAGNOSIS — G43.109 MIGRAINE WITH AURA AND WITHOUT STATUS MIGRAINOSUS, NOT INTRACTABLE: ICD-10-CM

## 2021-07-07 DIAGNOSIS — G47.00 INSOMNIA, UNSPECIFIED TYPE: ICD-10-CM

## 2021-07-07 PROCEDURE — 1126F AMNT PAIN NOTED NONE PRSNT: CPT | Mod: S$GLB,,, | Performed by: INTERNAL MEDICINE

## 2021-07-07 PROCEDURE — 99397 PER PM REEVAL EST PAT 65+ YR: CPT | Mod: S$GLB,,, | Performed by: INTERNAL MEDICINE

## 2021-07-07 PROCEDURE — 1126F PR PAIN SEVERITY QUANTIFIED, NO PAIN PRESENT: ICD-10-PCS | Mod: S$GLB,,, | Performed by: INTERNAL MEDICINE

## 2021-07-07 PROCEDURE — 99499 UNLISTED E&M SERVICE: CPT | Mod: S$GLB,,, | Performed by: INTERNAL MEDICINE

## 2021-07-07 PROCEDURE — 99499 RISK ADDL DX/OHS AUDIT: ICD-10-PCS | Mod: S$GLB,,, | Performed by: INTERNAL MEDICINE

## 2021-07-07 PROCEDURE — 99999 PR PBB SHADOW E&M-EST. PATIENT-LVL III: ICD-10-PCS | Mod: PBBFAC,,, | Performed by: INTERNAL MEDICINE

## 2021-07-07 PROCEDURE — 99397 PR PREVENTIVE VISIT,EST,65 & OVER: ICD-10-PCS | Mod: S$GLB,,, | Performed by: INTERNAL MEDICINE

## 2021-07-07 PROCEDURE — 99999 PR PBB SHADOW E&M-EST. PATIENT-LVL III: CPT | Mod: PBBFAC,,, | Performed by: INTERNAL MEDICINE

## 2021-07-07 RX ORDER — TRAZODONE HYDROCHLORIDE 50 MG/1
50 TABLET ORAL NIGHTLY
Qty: 90 TABLET | Refills: 3 | Status: SHIPPED | OUTPATIENT
Start: 2021-07-07 | End: 2022-01-31 | Stop reason: SDUPTHER

## 2021-07-07 RX ORDER — ZOLPIDEM TARTRATE 10 MG/1
TABLET ORAL
Qty: 90 TABLET | Refills: 1 | Status: SHIPPED | OUTPATIENT
Start: 2021-07-07 | End: 2022-01-31 | Stop reason: SDUPTHER

## 2021-07-07 RX ORDER — FUROSEMIDE 40 MG/1
40 TABLET ORAL DAILY
Qty: 90 TABLET | Refills: 3 | Status: SHIPPED | OUTPATIENT
Start: 2021-07-07 | End: 2022-01-31 | Stop reason: SDUPTHER

## 2021-07-07 RX ORDER — MELOXICAM 7.5 MG/1
7.5 TABLET ORAL 2 TIMES DAILY
Qty: 180 TABLET | Refills: 3 | Status: SHIPPED | OUTPATIENT
Start: 2021-07-07 | End: 2022-01-31 | Stop reason: SDUPTHER

## 2021-07-12 ENCOUNTER — TELEPHONE (OUTPATIENT)
Dept: INTERNAL MEDICINE | Facility: CLINIC | Age: 81
End: 2021-07-12

## 2021-07-12 DIAGNOSIS — Z12.12 SCREENING FOR COLORECTAL CANCER: Primary | ICD-10-CM

## 2021-07-12 DIAGNOSIS — Z12.11 SCREENING FOR COLORECTAL CANCER: Primary | ICD-10-CM

## 2021-07-14 ENCOUNTER — PES CALL (OUTPATIENT)
Dept: ADMINISTRATIVE | Facility: CLINIC | Age: 81
End: 2021-07-14

## 2021-07-26 ENCOUNTER — PATIENT MESSAGE (OUTPATIENT)
Dept: INTERNAL MEDICINE | Facility: CLINIC | Age: 81
End: 2021-07-26

## 2021-07-26 ENCOUNTER — TELEPHONE (OUTPATIENT)
Dept: ADMINISTRATIVE | Facility: HOSPITAL | Age: 81
End: 2021-07-26

## 2021-07-27 ENCOUNTER — PATIENT MESSAGE (OUTPATIENT)
Dept: INTERNAL MEDICINE | Facility: CLINIC | Age: 81
End: 2021-07-27

## 2021-08-05 ENCOUNTER — TELEPHONE (OUTPATIENT)
Dept: ADMINISTRATIVE | Facility: HOSPITAL | Age: 81
End: 2021-08-05

## 2021-08-20 ENCOUNTER — TELEPHONE (OUTPATIENT)
Dept: ADMINISTRATIVE | Facility: HOSPITAL | Age: 81
End: 2021-08-20

## 2021-09-10 ENCOUNTER — TELEPHONE (OUTPATIENT)
Dept: ADMINISTRATIVE | Facility: HOSPITAL | Age: 81
End: 2021-09-10

## 2021-09-22 ENCOUNTER — PATIENT OUTREACH (OUTPATIENT)
Dept: ADMINISTRATIVE | Facility: HOSPITAL | Age: 81
End: 2021-09-22

## 2021-10-12 ENCOUNTER — HOSPITAL ENCOUNTER (OUTPATIENT)
Dept: RADIOLOGY | Facility: HOSPITAL | Age: 81
Discharge: HOME | End: 2021-10-12
Attending: INTERNAL MEDICINE
Payer: MEDICARE

## 2021-10-12 DIAGNOSIS — Z12.31 BREAST CANCER SCREENING BY MAMMOGRAM: ICD-10-CM

## 2021-10-12 PROCEDURE — 77067 SCR MAMMO BI INCL CAD: CPT | Mod: 26,HCNC,, | Performed by: RADIOLOGY

## 2021-10-12 PROCEDURE — 77063 BREAST TOMOSYNTHESIS BI: CPT | Mod: 26,HCNC,, | Performed by: RADIOLOGY

## 2021-10-12 PROCEDURE — 77063 MAMMO DIGITAL SCREENING BILAT WITH TOMO: ICD-10-PCS | Mod: 26,HCNC,, | Performed by: RADIOLOGY

## 2021-10-12 PROCEDURE — 77067 SCR MAMMO BI INCL CAD: CPT | Mod: TC,HCNC

## 2021-10-12 PROCEDURE — 77067 MAMMO DIGITAL SCREENING BILAT WITH TOMO: ICD-10-PCS | Mod: 26,HCNC,, | Performed by: RADIOLOGY

## 2022-01-28 DIAGNOSIS — G43.909 MIGRAINE WITHOUT STATUS MIGRAINOSUS, NOT INTRACTABLE, UNSPECIFIED MIGRAINE TYPE: ICD-10-CM

## 2022-01-28 NOTE — TELEPHONE ENCOUNTER
No new care gaps identified.  Powered by [employer]. Reference number: [id number].   1/28/2022 7:38:55 AM CST

## 2022-01-31 ENCOUNTER — PATIENT MESSAGE (OUTPATIENT)
Dept: INTERNAL MEDICINE | Facility: CLINIC | Age: 82
End: 2022-01-31
Payer: MEDICARE

## 2022-01-31 RX ORDER — TRAZODONE HYDROCHLORIDE 50 MG/1
50 TABLET ORAL NIGHTLY
Qty: 90 TABLET | Refills: 3 | Status: SHIPPED | OUTPATIENT
Start: 2022-01-31 | End: 2022-02-07

## 2022-01-31 RX ORDER — FUROSEMIDE 40 MG/1
40 TABLET ORAL DAILY
Qty: 90 TABLET | Refills: 3 | Status: SHIPPED | OUTPATIENT
Start: 2022-01-31 | End: 2022-08-30

## 2022-01-31 RX ORDER — ZOLPIDEM TARTRATE 10 MG/1
TABLET ORAL
Qty: 90 TABLET | Refills: 1 | Status: SHIPPED | OUTPATIENT
Start: 2022-01-31 | End: 2022-06-27

## 2022-01-31 RX ORDER — MELOXICAM 7.5 MG/1
7.5 TABLET ORAL 2 TIMES DAILY
Qty: 180 TABLET | Refills: 3 | Status: SHIPPED | OUTPATIENT
Start: 2022-01-31 | End: 2022-08-30 | Stop reason: SDUPTHER

## 2022-01-31 NOTE — TELEPHONE ENCOUNTER
No new care gaps identified.  Powered by [employer]. Reference number: [id number].   1/31/2022 1:43:58 PM CST

## 2022-02-02 ENCOUNTER — PATIENT MESSAGE (OUTPATIENT)
Dept: INTERNAL MEDICINE | Facility: CLINIC | Age: 82
End: 2022-02-02
Payer: MEDICARE

## 2022-02-08 DIAGNOSIS — G43.909 MIGRAINE WITHOUT STATUS MIGRAINOSUS, NOT INTRACTABLE, UNSPECIFIED MIGRAINE TYPE: ICD-10-CM

## 2022-02-08 RX ORDER — SUMATRIPTAN SUCCINATE 100 MG/1
TABLET ORAL
Qty: 27 TABLET | Refills: 3 | Status: SHIPPED | OUTPATIENT
Start: 2022-02-08 | End: 2022-08-30 | Stop reason: SDUPTHER

## 2022-02-08 NOTE — TELEPHONE ENCOUNTER
No new care gaps identified.  Powered by [employer]. Reference number: [id number].   2/08/2022 11:51:38 AM CST

## 2022-02-10 RX ORDER — SUMATRIPTAN SUCCINATE 100 MG/1
TABLET ORAL
Qty: 27 TABLET | Refills: 3 | OUTPATIENT
Start: 2022-02-10

## 2022-07-11 ENCOUNTER — PATIENT MESSAGE (OUTPATIENT)
Dept: INTERNAL MEDICINE | Facility: CLINIC | Age: 82
End: 2022-07-11
Payer: MEDICARE

## 2022-07-12 ENCOUNTER — TELEPHONE (OUTPATIENT)
Dept: INTERNAL MEDICINE | Facility: CLINIC | Age: 82
End: 2022-07-12
Payer: MEDICARE

## 2022-07-12 NOTE — TELEPHONE ENCOUNTER
----- Message from Mile Oliveira sent at 7/12/2022 12:11 PM CDT -----  Contact: Migdalia Booth is retuning a missed call.Please give her a call back at [phone].

## 2022-07-12 NOTE — TELEPHONE ENCOUNTER
Call patient in tell her that yes she will still need lab work to be done.  Please book the lab work ordered 07/07/2021 to be done in 2 weeks and for her to see me 3 days after the blood work.  Schedule the blood work to be done in [place]

## 2022-07-12 NOTE — TELEPHONE ENCOUNTER
Spoke with patient and scheduled 2 weeks labs in [place] and [facility] to be seen few days with provider

## 2022-07-27 ENCOUNTER — LAB VISIT (OUTPATIENT)
Dept: LAB | Facility: HOSPITAL | Age: 82
End: 2022-07-27
Attending: INTERNAL MEDICINE
Payer: MEDICARE

## 2022-07-27 DIAGNOSIS — I10 ESSENTIAL HYPERTENSION: ICD-10-CM

## 2022-07-27 LAB
ALBUMIN SERPL BCP-MCNC: 3.6 G/DL (ref 3.5–5.2)
ALP SERPL-CCNC: 63 U/L (ref 55–135)
ALT SERPL W/O P-5'-P-CCNC: 17 U/L (ref 10–44)
ANION GAP SERPL CALC-SCNC: 9 MMOL/L (ref 8–16)
AST SERPL-CCNC: 23 U/L (ref 10–40)
BASOPHILS # BLD AUTO: 0.06 K/UL (ref 0–0.2)
BASOPHILS NFR BLD: 1.2 % (ref 0–1.9)
BILIRUB SERPL-MCNC: 0.5 MG/DL (ref 0.1–1)
BUN SERPL-MCNC: 16 MG/DL (ref 8–23)
CALCIUM SERPL-MCNC: 9.8 MG/DL (ref 8.7–10.5)
CHLORIDE SERPL-SCNC: 106 MMOL/L (ref 95–110)
CHOLEST SERPL-MCNC: 212 MG/DL (ref 120–199)
CHOLEST/HDLC SERPL: 3.7 {RATIO} (ref 2–5)
CO2 SERPL-SCNC: 25 MMOL/L (ref 23–29)
CREAT SERPL-MCNC: 1 MG/DL (ref 0.5–1.4)
DIFFERENTIAL METHOD: ABNORMAL
EOSINOPHIL # BLD AUTO: 0.2 K/UL (ref 0–0.5)
EOSINOPHIL NFR BLD: 3.1 % (ref 0–8)
ERYTHROCYTE [DISTWIDTH] IN BLOOD BY AUTOMATED COUNT: 13.9 % (ref 11.5–14.5)
EST. GFR  (AFRICAN AMERICAN): >60 ML/MIN/1.73 M^2
EST. GFR  (NON AFRICAN AMERICAN): 53.3 ML/MIN/1.73 M^2
GLUCOSE SERPL-MCNC: 89 MG/DL (ref 70–110)
HCT VFR BLD AUTO: 36.8 % (ref 37–48.5)
HDLC SERPL-MCNC: 57 MG/DL (ref 40–75)
HDLC SERPL: 26.9 % (ref 20–50)
HGB BLD-MCNC: 11.7 G/DL (ref 12–16)
IMM GRANULOCYTES # BLD AUTO: 0.02 K/UL (ref 0–0.04)
IMM GRANULOCYTES NFR BLD AUTO: 0.4 % (ref 0–0.5)
LDLC SERPL CALC-MCNC: 131.2 MG/DL (ref 63–159)
LYMPHOCYTES # BLD AUTO: 2 K/UL (ref 1–4.8)
LYMPHOCYTES NFR BLD: 38.2 % (ref 18–48)
MCH RBC QN AUTO: 33.4 PG (ref 27–31)
MCHC RBC AUTO-ENTMCNC: 31.8 G/DL (ref 32–36)
MCV RBC AUTO: 105 FL (ref 82–98)
MONOCYTES # BLD AUTO: 0.7 K/UL (ref 0.3–1)
MONOCYTES NFR BLD: 13.5 % (ref 4–15)
NEUTROPHILS # BLD AUTO: 2.3 K/UL (ref 1.8–7.7)
NEUTROPHILS NFR BLD: 43.6 % (ref 38–73)
NONHDLC SERPL-MCNC: 155 MG/DL
NRBC BLD-RTO: 0 /100 WBC
PLATELET # BLD AUTO: 537 K/UL (ref 150–450)
PMV BLD AUTO: 9.8 FL (ref 9.2–12.9)
POTASSIUM SERPL-SCNC: 4.4 MMOL/L (ref 3.5–5.1)
PROT SERPL-MCNC: 6.8 G/DL (ref 6–8.4)
RBC # BLD AUTO: 3.5 M/UL (ref 4–5.4)
SODIUM SERPL-SCNC: 140 MMOL/L (ref 136–145)
TRIGL SERPL-MCNC: 119 MG/DL (ref 30–150)
TSH SERPL DL<=0.005 MIU/L-ACNC: 2.99 UIU/ML (ref 0.4–4)
WBC # BLD AUTO: 5.19 K/UL (ref 3.9–12.7)

## 2022-07-27 PROCEDURE — 80053 COMPREHEN METABOLIC PANEL: CPT | Performed by: INTERNAL MEDICINE

## 2022-07-27 PROCEDURE — 80061 LIPID PANEL: CPT | Performed by: INTERNAL MEDICINE

## 2022-07-27 PROCEDURE — 84443 ASSAY THYROID STIM HORMONE: CPT | Performed by: INTERNAL MEDICINE

## 2022-07-27 PROCEDURE — 85025 COMPLETE CBC W/AUTO DIFF WBC: CPT | Performed by: INTERNAL MEDICINE

## 2022-07-27 PROCEDURE — 36415 COLL VENOUS BLD VENIPUNCTURE: CPT | Mod: PO | Performed by: INTERNAL MEDICINE

## 2022-08-05 ENCOUNTER — TELEPHONE (OUTPATIENT)
Dept: ADMINISTRATIVE | Facility: HOSPITAL | Age: 82
End: 2022-08-05
Payer: MEDICARE

## 2022-08-30 ENCOUNTER — OFFICE VISIT (OUTPATIENT)
Dept: INTERNAL MEDICINE | Facility: CLINIC | Age: 82
End: 2022-08-30
Payer: MEDICARE

## 2022-08-30 VITALS
DIASTOLIC BLOOD PRESSURE: 92 MMHG | HEIGHT: 65 IN | OXYGEN SATURATION: 98 % | WEIGHT: 144.81 LBS | SYSTOLIC BLOOD PRESSURE: 136 MMHG | HEART RATE: 76 BPM | BODY MASS INDEX: 24.12 KG/M2

## 2022-08-30 DIAGNOSIS — K21.9 GASTROESOPHAGEAL REFLUX DISEASE WITHOUT ESOPHAGITIS: ICD-10-CM

## 2022-08-30 DIAGNOSIS — G43.909 MIGRAINE WITHOUT STATUS MIGRAINOSUS, NOT INTRACTABLE, UNSPECIFIED MIGRAINE TYPE: ICD-10-CM

## 2022-08-30 DIAGNOSIS — M81.0 POSTMENOPAUSAL BONE LOSS: ICD-10-CM

## 2022-08-30 DIAGNOSIS — Z86.010 HISTORY OF COLON POLYPS: ICD-10-CM

## 2022-08-30 DIAGNOSIS — I10 ESSENTIAL HYPERTENSION: ICD-10-CM

## 2022-08-30 DIAGNOSIS — Z00.00 ROUTINE GENERAL MEDICAL EXAMINATION AT A HEALTH CARE FACILITY: Primary | ICD-10-CM

## 2022-08-30 DIAGNOSIS — F32.A DEPRESSION, UNSPECIFIED DEPRESSION TYPE: ICD-10-CM

## 2022-08-30 DIAGNOSIS — G43.109 MIGRAINE WITH AURA AND WITHOUT STATUS MIGRAINOSUS, NOT INTRACTABLE: ICD-10-CM

## 2022-08-30 DIAGNOSIS — Z12.31 SCREENING MAMMOGRAM FOR BREAST CANCER: ICD-10-CM

## 2022-08-30 DIAGNOSIS — E78.5 HYPERLIPIDEMIA, UNSPECIFIED HYPERLIPIDEMIA TYPE: ICD-10-CM

## 2022-08-30 PROCEDURE — 3288F FALL RISK ASSESSMENT DOCD: CPT | Mod: CPTII,S$GLB,, | Performed by: INTERNAL MEDICINE

## 2022-08-30 PROCEDURE — 99999 PR PBB SHADOW E&M-EST. PATIENT-LVL III: CPT | Mod: PBBFAC,,, | Performed by: INTERNAL MEDICINE

## 2022-08-30 PROCEDURE — 3080F DIAST BP >= 90 MM HG: CPT | Mod: CPTII,S$GLB,, | Performed by: INTERNAL MEDICINE

## 2022-08-30 PROCEDURE — 1159F PR MEDICATION LIST DOCUMENTED IN MEDICAL RECORD: ICD-10-PCS | Mod: CPTII,S$GLB,, | Performed by: INTERNAL MEDICINE

## 2022-08-30 PROCEDURE — 3080F PR MOST RECENT DIASTOLIC BLOOD PRESSURE >= 90 MM HG: ICD-10-PCS | Mod: CPTII,S$GLB,, | Performed by: INTERNAL MEDICINE

## 2022-08-30 PROCEDURE — 1101F PR PT FALLS ASSESS DOC 0-1 FALLS W/OUT INJ PAST YR: ICD-10-PCS | Mod: CPTII,S$GLB,, | Performed by: INTERNAL MEDICINE

## 2022-08-30 PROCEDURE — 99397 PR PREVENTIVE VISIT,EST,65 & OVER: ICD-10-PCS | Mod: S$GLB,,, | Performed by: INTERNAL MEDICINE

## 2022-08-30 PROCEDURE — 1126F AMNT PAIN NOTED NONE PRSNT: CPT | Mod: CPTII,S$GLB,, | Performed by: INTERNAL MEDICINE

## 2022-08-30 PROCEDURE — 99999 PR PBB SHADOW E&M-EST. PATIENT-LVL III: ICD-10-PCS | Mod: PBBFAC,,, | Performed by: INTERNAL MEDICINE

## 2022-08-30 PROCEDURE — 3075F PR MOST RECENT SYSTOLIC BLOOD PRESS GE 130-139MM HG: ICD-10-PCS | Mod: CPTII,S$GLB,, | Performed by: INTERNAL MEDICINE

## 2022-08-30 PROCEDURE — 1126F PR PAIN SEVERITY QUANTIFIED, NO PAIN PRESENT: ICD-10-PCS | Mod: CPTII,S$GLB,, | Performed by: INTERNAL MEDICINE

## 2022-08-30 PROCEDURE — 3075F SYST BP GE 130 - 139MM HG: CPT | Mod: CPTII,S$GLB,, | Performed by: INTERNAL MEDICINE

## 2022-08-30 PROCEDURE — 1159F MED LIST DOCD IN RCRD: CPT | Mod: CPTII,S$GLB,, | Performed by: INTERNAL MEDICINE

## 2022-08-30 PROCEDURE — 99397 PER PM REEVAL EST PAT 65+ YR: CPT | Mod: S$GLB,,, | Performed by: INTERNAL MEDICINE

## 2022-08-30 PROCEDURE — 3288F PR FALLS RISK ASSESSMENT DOCUMENTED: ICD-10-PCS | Mod: CPTII,S$GLB,, | Performed by: INTERNAL MEDICINE

## 2022-08-30 PROCEDURE — 1101F PT FALLS ASSESS-DOCD LE1/YR: CPT | Mod: CPTII,S$GLB,, | Performed by: INTERNAL MEDICINE

## 2022-08-30 RX ORDER — DOXEPIN HYDROCHLORIDE 25 MG/1
25 CAPSULE ORAL NIGHTLY
Qty: 90 CAPSULE | Refills: 3 | Status: SHIPPED | OUTPATIENT
Start: 2022-08-30 | End: 2023-01-12

## 2022-08-30 RX ORDER — MELOXICAM 7.5 MG/1
7.5 TABLET ORAL 2 TIMES DAILY
Qty: 180 TABLET | Refills: 3 | Status: SHIPPED | OUTPATIENT
Start: 2022-08-30 | End: 2023-01-12

## 2022-08-30 RX ORDER — ZOLPIDEM TARTRATE 10 MG/1
10 TABLET ORAL NIGHTLY PRN
Qty: 90 TABLET | Refills: 1 | Status: SHIPPED | OUTPATIENT
Start: 2022-08-30 | End: 2023-04-13

## 2022-08-30 RX ORDER — AMLODIPINE BESYLATE 5 MG/1
5 TABLET ORAL DAILY
Qty: 90 TABLET | Refills: 3 | Status: SHIPPED | OUTPATIENT
Start: 2022-08-30 | End: 2023-01-12

## 2022-08-30 RX ORDER — SUMATRIPTAN SUCCINATE 100 MG/1
TABLET ORAL
Qty: 27 TABLET | Refills: 3 | Status: SHIPPED | OUTPATIENT
Start: 2022-08-30 | End: 2023-07-06

## 2022-08-30 NOTE — PROGRESS NOTES
"HPI:  Patient is 82-year-old female who comes today for her yearly physical.  Patient has typical aches and pains from her arthritis.  She currently is taking meloxicam for that.  There has been no new change in that regard.  Her blood pressure home usually in the 140 over 80-90 range.  There have been no other new complaints  Current MEDS: medcard review, verified and update  Allergies: Per the electronic medical record    Past Medical History:   Diagnosis Date    Anemia     Arthritis     left shoulder    Asthma     Chronic bronchitis     Depression     Essential hypertension     GERD (gastroesophageal reflux disease)     History of colon polyps     Due for colonoscopy 2021    Hyperlipidemia     Insomnia     Migraine     Thyroid disease        Past Surgical History:   Procedure Laterality Date    BACK SURGERY      epidural    BREAST BIOPSY Right over 10 yrs ago    benign    COLONOSCOPY N/A 7/11/2016    Procedure: COLONOSCOPY;  Surgeon: Yordy Penn MD;  Location: [facility];  Service: Endoscopy;  Laterality: N/A;    KNEE ARTHROPLASTY      KNEE ARTHROSCOPY      bilaterly    TONSILLECTOMY         SHx: per the electronic medical record    FHx: recorded in the electronic medical record    ROS:    denies any chest pains or shortness of breath. Denies any nausea, vomiting or diarrhea. Denies any fever, chills or sweats. Denies any change in weight, voice, stool, skin or hair. Denies any dysuria, dyspepsia or dysphagia. Denies any change in vision, hearing or headaches. Denies any swollen lymph nodes or loss of memory.    PE:  BP (!) 136/92 (BP Location: Left arm)   Pulse 76   Ht 5' 5" (1.651 m)   Wt 65.7 kg (144 lb 13.5 oz)   SpO2 98%   BMI 24.10 kg/m²   Gen: Well-developed, well-nourished, female, in no acute distress, oriented x3  HEENT: neck is supple, no adenopathy, carotids 2+ equal without bruits, thyroid exam normal size without nodules.  CHEST: clear to auscultation and percussion  CVS: regular rate " and rhythm with 2/6 systolic murmur, no gallop or rubs  ABD: soft, benign, no rebound no guarding, no distention. Bowel sounds are normal.     Nontender,  no palpable masses, no organomegaly and no audible bruits.  BREAST: no masses.  No nipple inversion, retraction, or deviation.  EXT: no clubbing, cyanosis, or edema  LYMPH: no cervical, inguinal, or axillary adenopathy  FEET: no loss of sensation.  No ulcers or pressure sores.  NEURO: gait normal.  Cranial nerves II- XII intact. No nystagmus.  Speech normal.   Gross motor and sensory unremarkable.  PELVIC: deferred    Lab Results   Component Value Date    WBC 5.19 07/27/2022    HGB 11.7 (L) 07/27/2022    HCT 36.8 (L) 07/27/2022     (H) 07/27/2022    CHOL 212 (H) 07/27/2022    TRIG 119 07/27/2022    HDL 57 07/27/2022    ALT 17 07/27/2022    AST 23 07/27/2022     07/27/2022    K 4.4 07/27/2022     07/27/2022    CREATININE 1.0 07/27/2022    BUN 16 07/27/2022    CO2 25 07/27/2022    TSH 2.991 07/27/2022    INR 1.0 06/09/2011       Impression:  Stable problems below  Patient Active Problem List   Diagnosis    GERD (gastroesophageal reflux disease)    Hyperlipidemia    Depression    Insomnia    Migraine    Essential hypertension    History of colon polyps       Plan:   Orders Placed This Encounter    DXA Bone Density Spine And Hip    Mammo Digital Screening Bilat w/ Lopez    zolpidem (AMBIEN) 10 mg Tab    amLODIPine (NORVASC) 5 MG tablet    meloxicam (MOBIC) 7.5 MG tablet    doxepin (SINEQUAN) 25 MG capsule    sumatriptan (IMITREX) 100 MG tablet     Patient will continue with her current medications.  She will try some doxepin at bedtime to see if it will improve her insomnia.  Patient will be set up for mammogram and DEXA scan in about 3 months.  She will establish care with a new primary care physician in the [facility].    This note is generated with speech recognition software and is subject to transcription error and sound alike phrases  that may be missed by proofreading.

## 2022-09-03 ENCOUNTER — PATIENT MESSAGE (OUTPATIENT)
Dept: INTERNAL MEDICINE | Facility: CLINIC | Age: 82
End: 2022-09-03
Payer: MEDICARE

## 2022-09-23 ENCOUNTER — TELEPHONE (OUTPATIENT)
Dept: INTERNAL MEDICINE | Facility: CLINIC | Age: 82
End: 2022-09-23
Payer: MEDICARE

## 2022-09-23 ENCOUNTER — PATIENT MESSAGE (OUTPATIENT)
Dept: INTERNAL MEDICINE | Facility: CLINIC | Age: 82
End: 2022-09-23
Payer: MEDICARE

## 2022-09-23 DIAGNOSIS — R06.02 SOB (SHORTNESS OF BREATH): Primary | ICD-10-CM

## 2022-09-23 NOTE — TELEPHONE ENCOUNTER
----- Message from Rachael Rubio sent at 9/23/2022  2:40 PM CDT -----  Contact: LEN BARROSO [[id number]]@ [phone]  Patient is returning a phone call.  Who left a message for the patient: Missed Call  Does patient know what this is regarding:  No  Would you like a call back, or a response through your [facility] portal?:   Call back   Comments:

## 2022-09-24 NOTE — TELEPHONE ENCOUNTER
I will be glad to put in the referral for her to see pulmonary but I have to know why. Call her and find out why she is wanting to see Pulmonary so I can put in the referral. I have no idea if they go to [facility] or [facility] but for sure they are at [facility]. Call her to set up once you have sent me the reason why.     English

## 2022-09-26 ENCOUNTER — TELEPHONE (OUTPATIENT)
Dept: ADMINISTRATIVE | Facility: HOSPITAL | Age: 82
End: 2022-09-26
Payer: MEDICARE

## 2022-09-27 ENCOUNTER — TELEPHONE (OUTPATIENT)
Dept: INTERNAL MEDICINE | Facility: CLINIC | Age: 82
End: 2022-09-27
Payer: MEDICARE

## 2022-10-19 ENCOUNTER — HOSPITAL ENCOUNTER (OUTPATIENT)
Dept: RADIOLOGY | Facility: HOSPITAL | Age: 82
Discharge: HOME | End: 2022-10-19
Attending: INTERNAL MEDICINE
Payer: MEDICARE

## 2022-10-19 VITALS — BODY MASS INDEX: 23.88 KG/M2 | WEIGHT: 143.31 LBS | HEIGHT: 65 IN

## 2022-10-19 DIAGNOSIS — Z12.31 SCREENING MAMMOGRAM FOR BREAST CANCER: ICD-10-CM

## 2022-10-19 PROCEDURE — 77063 BREAST TOMOSYNTHESIS BI: CPT | Mod: TC

## 2022-10-19 PROCEDURE — 77063 BREAST TOMOSYNTHESIS BI: CPT | Mod: 26,,, | Performed by: RADIOLOGY

## 2022-10-19 PROCEDURE — 77067 SCR MAMMO BI INCL CAD: CPT | Mod: 26,,, | Performed by: RADIOLOGY

## 2022-10-19 PROCEDURE — 77067 MAMMO DIGITAL SCREENING BILAT WITH TOMO: ICD-10-PCS | Mod: 26,,, | Performed by: RADIOLOGY

## 2022-10-19 PROCEDURE — 77067 SCR MAMMO BI INCL CAD: CPT | Mod: TC

## 2022-10-19 PROCEDURE — 77063 MAMMO DIGITAL SCREENING BILAT WITH TOMO: ICD-10-PCS | Mod: 26,,, | Performed by: RADIOLOGY

## 2022-10-20 ENCOUNTER — PATIENT MESSAGE (OUTPATIENT)
Dept: INTERNAL MEDICINE | Facility: CLINIC | Age: 82
End: 2022-10-20
Payer: MEDICARE

## 2022-10-21 ENCOUNTER — PATIENT MESSAGE (OUTPATIENT)
Dept: INTERNAL MEDICINE | Facility: CLINIC | Age: 82
End: 2022-10-21
Payer: MEDICARE

## 2022-10-21 DIAGNOSIS — M81.0 OSTEOPOROSIS, UNSPECIFIED OSTEOPOROSIS TYPE, UNSPECIFIED PATHOLOGICAL FRACTURE PRESENCE: Primary | ICD-10-CM

## 2022-10-22 PROBLEM — M81.0 OSTEOPOROSIS: Status: ACTIVE | Noted: 2022-10-22

## 2022-10-22 RX ORDER — ALENDRONATE SODIUM 70 MG/1
70 TABLET ORAL
Qty: 13 TABLET | Refills: 3 | Status: SHIPPED | OUTPATIENT
Start: 2022-10-22 | End: 2023-07-27

## 2022-11-10 ENCOUNTER — OFFICE VISIT (OUTPATIENT)
Dept: PULMONOLOGY | Facility: CLINIC | Age: 82
End: 2022-11-10
Payer: MEDICARE

## 2022-11-10 ENCOUNTER — CLINICAL SUPPORT (OUTPATIENT)
Dept: PULMONOLOGY | Facility: CLINIC | Age: 82
End: 2022-11-10
Payer: MEDICARE

## 2022-11-10 ENCOUNTER — PATIENT MESSAGE (OUTPATIENT)
Dept: PULMONOLOGY | Facility: CLINIC | Age: 82
End: 2022-11-10

## 2022-11-10 ENCOUNTER — HOSPITAL ENCOUNTER (OUTPATIENT)
Dept: RADIOLOGY | Facility: HOSPITAL | Age: 82
Discharge: HOME | End: 2022-11-10
Attending: NURSE PRACTITIONER
Payer: MEDICARE

## 2022-11-10 VITALS
BODY MASS INDEX: 24.12 KG/M2 | DIASTOLIC BLOOD PRESSURE: 78 MMHG | HEART RATE: 67 BPM | OXYGEN SATURATION: 99 % | HEIGHT: 65 IN | WEIGHT: 144.81 LBS | RESPIRATION RATE: 16 BRPM | SYSTOLIC BLOOD PRESSURE: 110 MMHG

## 2022-11-10 VITALS — WEIGHT: 144.81 LBS | HEIGHT: 65 IN | BODY MASS INDEX: 24.12 KG/M2

## 2022-11-10 DIAGNOSIS — K44.9 PARAESOPHAGEAL HERNIA: ICD-10-CM

## 2022-11-10 DIAGNOSIS — R53.1 GENERALIZED WEAKNESS: ICD-10-CM

## 2022-11-10 DIAGNOSIS — R06.02 SOB (SHORTNESS OF BREATH): ICD-10-CM

## 2022-11-10 DIAGNOSIS — R11.10 VOMITING, UNSPECIFIED VOMITING TYPE, UNSPECIFIED WHETHER NAUSEA PRESENT: ICD-10-CM

## 2022-11-10 DIAGNOSIS — R42 DIZZINESS: ICD-10-CM

## 2022-11-10 DIAGNOSIS — R53.1 GENERALIZED WEAKNESS: Primary | ICD-10-CM

## 2022-11-10 DIAGNOSIS — K52.9 COLITIS: ICD-10-CM

## 2022-11-10 LAB
BRPFT: ABNORMAL
DLCO SINGLE BREATH LLN: 14.71
DLCO SINGLE BREATH PRE REF: 62.5 %
DLCO SINGLE BREATH REF: 20.44
DLCOC SBVA LLN: 2.64
DLCOC SBVA REF: 4
DLCOC SINGLE BREATH LLN: 14.71
DLCOC SINGLE BREATH REF: 20.44
DLCOVA LLN: 2.64
DLCOVA PRE REF: 79.7 %
DLCOVA PRE: 3.19 ML/(MIN*MMHG*L) (ref 2.64–5.37)
DLCOVA REF: 4
ERV LLN: -16449.49
ERV PRE REF: 128.3 %
ERV REF: 0.51
FEF 25 75 CHG: -14.1 %
FEF 25 75 LLN: 0.65
FEF 25 75 POST REF: 57.5 %
FEF 25 75 PRE REF: 66.9 %
FEF 25 75 REF: 1.57
FET100 CHG: 36.9 %
FEV1 CHG: 9.4 %
FEV1 FVC CHG: -5.8 %
FEV1 FVC LLN: 62
FEV1 FVC POST REF: 87.1 %
FEV1 FVC PRE REF: 92.4 %
FEV1 FVC REF: 77
FEV1 LLN: 1.38
FEV1 POST REF: 94.8 %
FEV1 PRE REF: 86.6 %
FEV1 REF: 1.98
FRCPLETH LLN: 1.96
FRCPLETH PREREF: 123.8 %
FRCPLETH REF: 2.78
FVC CHG: 16.1 %
FVC LLN: 1.82
FVC POST REF: 107.3 %
FVC PRE REF: 92.4 %
FVC REF: 2.61
IVC PRE: 2.47 L (ref 1.82–3.44)
IVC SINGLE BREATH LLN: 1.82
IVC SINGLE BREATH PRE REF: 94.7 %
IVC SINGLE BREATH REF: 2.61
MVV LLN: 65
MVV PRE REF: 69.2 %
MVV REF: 77
PEF CHG: 24.9 %
PEF LLN: 3.18
PEF POST REF: 88.5 %
PEF PRE REF: 70.8 %
PEF REF: 4.95
POST FEF 25 75: 0.9 L/S (ref 0.65–2.96)
POST FET 100: 14.02 SEC
POST FEV1 FVC: 66.87 % (ref 62.07–89.59)
POST FEV1: 1.87 L (ref 1.38–2.55)
POST FVC: 2.8 L (ref 1.82–3.44)
POST PEF: 4.38 L/S (ref 3.18–6.71)
PRE DLCO: 12.78 ML/(MIN*MMHG) (ref 14.71–26.17)
PRE ERV: 0.65 L (ref -16449.49–16450.51)
PRE FEF 25 75: 1.05 L/S (ref 0.65–2.96)
PRE FET 100: 10.24 SEC
PRE FEV1 FVC: 70.99 % (ref 62.07–89.59)
PRE FEV1: 1.71 L (ref 1.38–2.55)
PRE FRC PL: 3.44 L (ref 1.96–3.6)
PRE FVC: 2.41 L (ref 1.82–3.44)
PRE MVV: 53.19 L/MIN (ref 65.33–88.39)
PRE PEF: 3.5 L/S (ref 3.18–6.71)
PRE RV: 2.79 L (ref 1.69–2.84)
PRE TLC: 5.2 L (ref 4.12–6.09)
RAW LLN: 3.06
RAW PRE REF: 189.8 %
RAW PRE: 5.81 CMH2O*S/L (ref 3.06–3.06)
RAW REF: 3.06
RV LLN: 1.69
RV PRE REF: 122.8 %
RV REF: 2.27
RVTLC LLN: 37
RVTLC PRE REF: 116.1 %
RVTLC PRE: 53.59 % (ref 36.57–55.75)
RVTLC REF: 46
TLC LLN: 4.12
TLC PRE REF: 101.8 %
TLC REF: 5.11
VA PRE: 4.01 L (ref 4.96–4.96)
VA SINGLE BREATH LLN: 4.96
VA SINGLE BREATH PRE REF: 80.8 %
VA SINGLE BREATH REF: 4.96
VC LLN: 1.82
VC PRE REF: 92.4 %
VC PRE: 2.41 L (ref 1.82–3.44)
VC REF: 2.61

## 2022-11-10 PROCEDURE — 1101F PR PT FALLS ASSESS DOC 0-1 FALLS W/OUT INJ PAST YR: ICD-10-PCS | Mod: CPTII,S$GLB,, | Performed by: NURSE PRACTITIONER

## 2022-11-10 PROCEDURE — 94060 PR EVAL OF BRONCHOSPASM: ICD-10-PCS | Mod: S$GLB,,, | Performed by: INTERNAL MEDICINE

## 2022-11-10 PROCEDURE — 94726 PLETHYSMOGRAPHY LUNG VOLUMES: CPT | Mod: S$GLB,,, | Performed by: INTERNAL MEDICINE

## 2022-11-10 PROCEDURE — 71046 X-RAY EXAM CHEST 2 VIEWS: CPT | Mod: TC,FY,PO

## 2022-11-10 PROCEDURE — 3074F SYST BP LT 130 MM HG: CPT | Mod: CPTII,S$GLB,, | Performed by: NURSE PRACTITIONER

## 2022-11-10 PROCEDURE — 3288F PR FALLS RISK ASSESSMENT DOCUMENTED: ICD-10-PCS | Mod: CPTII,S$GLB,, | Performed by: NURSE PRACTITIONER

## 2022-11-10 PROCEDURE — 94060 EVALUATION OF WHEEZING: CPT | Mod: S$GLB,,, | Performed by: INTERNAL MEDICINE

## 2022-11-10 PROCEDURE — 94726 PULM FUNCT TST PLETHYSMOGRAP: ICD-10-PCS | Mod: S$GLB,,, | Performed by: INTERNAL MEDICINE

## 2022-11-10 PROCEDURE — 99204 PR OFFICE/OUTPT VISIT, NEW, LEVL IV, 45-59 MIN: ICD-10-PCS | Mod: 25,S$GLB,, | Performed by: NURSE PRACTITIONER

## 2022-11-10 PROCEDURE — 3288F FALL RISK ASSESSMENT DOCD: CPT | Mod: CPTII,S$GLB,, | Performed by: NURSE PRACTITIONER

## 2022-11-10 PROCEDURE — 99999 PR PBB SHADOW E&M-EST. PATIENT-LVL I: ICD-10-PCS | Mod: PBBFAC,,,

## 2022-11-10 PROCEDURE — 1160F PR REVIEW ALL MEDS BY PRESCRIBER/CLIN PHARMACIST DOCUMENTED: ICD-10-PCS | Mod: CPTII,S$GLB,, | Performed by: NURSE PRACTITIONER

## 2022-11-10 PROCEDURE — 99999 PR PBB SHADOW E&M-EST. PATIENT-LVL V: CPT | Mod: PBBFAC,,, | Performed by: NURSE PRACTITIONER

## 2022-11-10 PROCEDURE — 3074F PR MOST RECENT SYSTOLIC BLOOD PRESSURE < 130 MM HG: ICD-10-PCS | Mod: CPTII,S$GLB,, | Performed by: NURSE PRACTITIONER

## 2022-11-10 PROCEDURE — 99999 PR PBB SHADOW E&M-EST. PATIENT-LVL I: CPT | Mod: PBBFAC,,,

## 2022-11-10 PROCEDURE — 1126F PR PAIN SEVERITY QUANTIFIED, NO PAIN PRESENT: ICD-10-PCS | Mod: CPTII,S$GLB,, | Performed by: NURSE PRACTITIONER

## 2022-11-10 PROCEDURE — 71046 X-RAY EXAM CHEST 2 VIEWS: CPT | Mod: 26,,, | Performed by: RADIOLOGY

## 2022-11-10 PROCEDURE — 94618 PULMONARY STRESS TESTING: ICD-10-PCS | Mod: S$GLB,,, | Performed by: INTERNAL MEDICINE

## 2022-11-10 PROCEDURE — 1101F PT FALLS ASSESS-DOCD LE1/YR: CPT | Mod: CPTII,S$GLB,, | Performed by: NURSE PRACTITIONER

## 2022-11-10 PROCEDURE — 99204 OFFICE O/P NEW MOD 45 MIN: CPT | Mod: 25,S$GLB,, | Performed by: NURSE PRACTITIONER

## 2022-11-10 PROCEDURE — 1126F AMNT PAIN NOTED NONE PRSNT: CPT | Mod: CPTII,S$GLB,, | Performed by: NURSE PRACTITIONER

## 2022-11-10 PROCEDURE — 1159F MED LIST DOCD IN RCRD: CPT | Mod: CPTII,S$GLB,, | Performed by: NURSE PRACTITIONER

## 2022-11-10 PROCEDURE — 94618 PULMONARY STRESS TESTING: CPT | Mod: S$GLB,,, | Performed by: INTERNAL MEDICINE

## 2022-11-10 PROCEDURE — 71046 XR CHEST PA AND LATERAL: ICD-10-PCS | Mod: 26,,, | Performed by: RADIOLOGY

## 2022-11-10 PROCEDURE — 1159F PR MEDICATION LIST DOCUMENTED IN MEDICAL RECORD: ICD-10-PCS | Mod: CPTII,S$GLB,, | Performed by: NURSE PRACTITIONER

## 2022-11-10 PROCEDURE — 3078F DIAST BP <80 MM HG: CPT | Mod: CPTII,S$GLB,, | Performed by: NURSE PRACTITIONER

## 2022-11-10 PROCEDURE — 3078F PR MOST RECENT DIASTOLIC BLOOD PRESSURE < 80 MM HG: ICD-10-PCS | Mod: CPTII,S$GLB,, | Performed by: NURSE PRACTITIONER

## 2022-11-10 PROCEDURE — 99999 PR PBB SHADOW E&M-EST. PATIENT-LVL V: ICD-10-PCS | Mod: PBBFAC,,, | Performed by: NURSE PRACTITIONER

## 2022-11-10 PROCEDURE — 1160F RVW MEDS BY RX/DR IN RCRD: CPT | Mod: CPTII,S$GLB,, | Performed by: NURSE PRACTITIONER

## 2022-11-10 PROCEDURE — 94729 PR C02/MEMBANE DIFFUSE CAPACITY: ICD-10-PCS | Mod: S$GLB,,, | Performed by: INTERNAL MEDICINE

## 2022-11-10 PROCEDURE — 94729 DIFFUSING CAPACITY: CPT | Mod: S$GLB,,, | Performed by: INTERNAL MEDICINE

## 2022-11-10 NOTE — PROGRESS NOTES
"Subjective:      Patient ID: Migdalia Luna is a 82 y.o. female.    Chief Complaint: Apnea    HPI  Presents for LEAL with associated all over body weakness, dizziness, and fatigue. She has to stop and rest due to extreme exhaustion. She states 6 months ago she was playing tennis and now can't serve a ball without wanting to pass out. She states symptoms since July 2022 when she was in ED for colitis and dehydration. Her diarrhea has resolved but she is having a hard time keeping good down. Emesis when she eats too much and feels like food "gets stuck". Reviewing notes she has paraesophageal hernia and Dr. Kincaid ordered upper GI for further evaluation. Patient reminded to schedule 9/28/22. LEAL is not associated with wheezing or chest pain. Lifetime nonsmoker. Hx of cough and bronchitis.     Patient Active Problem List   Diagnosis    GERD (gastroesophageal reflux disease)    Hyperlipidemia    Depression    Insomnia    Migraine    Essential hypertension    History of colon polyps    Osteoporosis       /78   Pulse 67   Resp 16   Ht 5' 5" (1.651 m)   Wt 65.7 kg (144 lb 13.5 oz)   SpO2 99%   BMI 24.10 kg/m²   Body mass index is 24.1 kg/m².    Review of Systems   Constitutional:  Positive for activity change, appetite change, fatigue and weakness.   HENT: Negative.     Respiratory:  Positive for dyspnea on extertion.    Endocrine: endocrine negative    Musculoskeletal: Negative.    Gastrointestinal:  Positive for vomiting and abdominal pain.   Neurological:  Positive for dizziness and light-headedness.   Objective:      Physical Exam  Constitutional:       Appearance: Normal appearance. She is well-developed.   HENT:      Head: Normocephalic and atraumatic.      Nose: Nose normal.      Mouth/Throat:      Pharynx: Oropharynx is clear.   Cardiovascular:      Rate and Rhythm: Normal rate and regular rhythm.      Heart sounds: No murmur heard.    No gallop.   Pulmonary:      Effort: Pulmonary effort is normal.      " Breath sounds: Normal breath sounds.   Abdominal:      Palpations: Abdomen is soft. There is no mass.      Tenderness: There is no abdominal tenderness.   Musculoskeletal:         General: Normal range of motion.      Cervical back: Normal range of motion and neck supple.   Skin:     General: Skin is warm and dry.   Neurological:      Mental Status: She is alert and oriented to person, place, and time.   Psychiatric:         Mood and Affect: Mood normal.         Behavior: Behavior normal.     Personal Diagnostic Review    Walk:Normal  PFT: Normal megan and lung volumes. Mild decrease in DLCO        Assessment:       1. Generalized weakness    2. SOB (shortness of breath)    3. Colitis    4. Dizziness    5. Vomiting, unspecified vomiting type, unspecified whether nausea present    6. Paraesophageal hernia          Outpatient Encounter Medications as of 11/10/2022   Medication Sig Dispense Refill    alendronate (FOSAMAX) 70 MG tablet Take 1 tablet (70 mg total) by mouth every 7 days. 13 tablet 3    meloxicam (MOBIC) 7.5 MG tablet Take 1 tablet (7.5 mg total) by mouth 2 (two) times a day. 180 tablet 3    sumatriptan (IMITREX) 100 MG tablet TAKE 1 TABLET WITH ONSET OF HEADACHE, MAY REPEAT ONE TIME TWO HOURS LATER IF NEEDED 27 tablet 3    zolpidem (AMBIEN) 10 mg Tab Take 1 tablet (10 mg total) by mouth nightly as needed. 90 tablet 1    amLODIPine (NORVASC) 5 MG tablet Take 1 tablet (5 mg total) by mouth once daily. 90 tablet 3    doxepin (SINEQUAN) 25 MG capsule Take 1 capsule (25 mg total) by mouth every evening. 90 capsule 3    inulin 2 gram Chew Take 1 tablet by mouth.      TURMERIC-TURMERIC ROOT EXTRACT ORAL Take 1 tablet by mouth.       No facility-administered encounter medications on file as of 11/10/2022.     Orders Placed This Encounter   Procedures    X-Ray Chest PA And Lateral     Standing Status:   Future     Number of Occurrences:   1     Standing Expiration Date:   11/10/2023     Order Specific Question:    May the Radiologist modify the order per protocol to meet the clinical needs of the patient?     Answer:   Yes     Order Specific Question:   Release to patient     Answer:   Immediate    D-DIMER, QUANTITATIVE     Standing Status:   Future     Standing Expiration Date:   1/9/2024    Ambulatory referral/consult to Gastroenterology     Standing Status:   Future     Standing Expiration Date:   12/10/2023     Referral Priority:   Routine     Referral Type:   Consultation     Referral Reason:   Specialty Services Required     Requested Specialty:   Gastroenterology     Number of Visits Requested:   1    Echo     Standing Status:   Future     Standing Expiration Date:   11/10/2023     Order Specific Question:   Ultrasound enhancing contrast?     Answer:   Yes     Order Specific Question:   Release to patient     Answer:   Immediate    Complete PFT with bronchodilator     Standing Status:   Future     Number of Occurrences:   1     Standing Expiration Date:   11/10/2023     Order Specific Question:   Release to patient     Answer:   Immediate    Stress test, pulmonary     Standing Status:   Future     Number of Occurrences:   1     Standing Expiration Date:   11/10/2023     Order Specific Question:   Reason for study     Answer:   Functional status     Order Specific Question:   Release to patient     Answer:   Immediate     Plan:   LEAL associated with generalized weakness and GI complaints.   Discussed following up with GI, Dr. Kincaid for her upper GI paraesophageal hernia. She would like to see someone at [facility]. Referral placed.   Review pulmonary testing. Mild decrease DLCO. D dimer ordered.     Problem List Items Addressed This Visit    None  Visit Diagnoses       Generalized weakness    -  Primary    Relevant Orders    Echo    SOB (shortness of breath)        Relevant Orders    Echo    D-DIMER, QUANTITATIVE    Colitis        Relevant Orders    Ambulatory referral/consult to Gastroenterology    Dizziness         Vomiting, unspecified vomiting type, unspecified whether nausea present        Relevant Orders    Ambulatory referral/consult to Gastroenterology    Paraesophageal hernia

## 2022-11-10 NOTE — PROCEDURES
"[facility] - Pulmonary Function  Six Minute Walk     SUMMARY     Ordering Provider: Elizabeth LeJeune NP   Interpreting Provider: Dr Parnell  Performing nurse/tech/RT: MARIELY RRT  Diagnosis: Shortness of Breath  Height: 5' 5" (165.1 cm)  Weight: 65.7 kg (144 lb 13.5 oz)  BMI (Calculated): 24.1   Patient Race:             Phase Oxygen Assessment Supplemental O2 Heart   Rate Blood Pressure Enrique Dyspnea Scale Rating   Resting 97 % Room Air 81 bpm 142/76 0   Exercise        Minute        1 98 % Room Air 91 bpm     2 100 % Room Air 92 bpm     3 98 % Room Air 92 bpm     4 99 % Room Air 92 bpm     5 98 % Room Air 93 bpm     6  98 % Room Air 95 bpm 141/89 0   Recovery        Minute        1 99 % Room Air 83 bpm     2 99 % Room Air 79 bpm     3 100 % Room Air 80 bpm     4 99 % Room Air 81 bpm 140/78 0     Six Minute Walk Summary  6MWT Status: completed without stopping  Patient Reported:  (back pain)     Interpretation:  Did the patient stop or pause?: No         Total Time Walked (Calculated): 360 seconds  Final Partial Lap Distance (feet): 125 feet  Total Distance Meters (Calculated): 281.94 meters  Predicted Distance Meters (Calculated): 402.51 meters  Percentage of Predicted (Calculated): 70.05  Peak VO2 (Calculated): 12.44  Mets: 3.55  Has The Patient Had a Previous Six Minute Walk Test?: No       Previous 6MWT Results  Has The Patient Had a Previous Six Minute Walk Test?: No           CLINICAL INTERPRETATION:  Six minute walk distance is 281.94m (70.05 % predicted) with no dyspnea.  During exercise, there was no significant desaturation while breathing room air.  Blood pressure remained stable and Heart rate remained stable with walking.  The patient reported non-pulmonary symptoms during exercise.  The patient did complete the study, walking 360 seconds of the 360 second test.  Based upon age and body mass index, exercise capacity is normal.      [] Mild exercise-induced hypoxemia described as an arterial " oxygen saturation of 93-95% (or 3-4% less than at rest)  []  Moderate exercise-induced hypoxemia as 89-93%  []  Severe exercise induced hypoxemia as < 89% O2 saturation.  Medicare Criteria for oxygen prescription comments:   []  When arterial oxygen saturation is at or below 88% during exercise (severe exercise induced hypoxemia) then the patient falls under Medicare Group 1 criteria for supplemental oxygen

## 2022-11-11 ENCOUNTER — PATIENT OUTREACH (OUTPATIENT)
Dept: ADMINISTRATIVE | Facility: HOSPITAL | Age: 82
End: 2022-11-11
Payer: MEDICARE

## 2022-11-11 ENCOUNTER — PATIENT MESSAGE (OUTPATIENT)
Dept: GASTROENTEROLOGY | Facility: CLINIC | Age: 82
End: 2022-11-11
Payer: MEDICARE

## 2022-11-11 NOTE — PROGRESS NOTES
HTN Report:  Spoke to pt she requested PCP change from Dr Pollock to Dr Puma Deal due to drive is too long to [place] more convenient for [place] go [place]. Appt scheduled with Dr Deal for January 5 2023, pt was driving will send her message thru portal with appt date and time.

## 2022-11-21 ENCOUNTER — PATIENT MESSAGE (OUTPATIENT)
Dept: INTERNAL MEDICINE | Facility: CLINIC | Age: 82
End: 2022-11-21
Payer: MEDICARE

## 2022-11-21 ENCOUNTER — PATIENT MESSAGE (OUTPATIENT)
Dept: GASTROENTEROLOGY | Facility: CLINIC | Age: 82
End: 2022-11-21
Payer: MEDICARE

## 2023-01-04 ENCOUNTER — TELEPHONE (OUTPATIENT)
Dept: SLEEP MEDICINE | Facility: CLINIC | Age: 83
End: 2023-01-04
Payer: MEDICARE

## 2023-01-04 NOTE — TELEPHONE ENCOUNTER
----- Message from Elizabeth Lejeune, NP sent at 1/4/2023 12:50 PM CST -----  Contact: [phone]  In the location of [place], Dr. Roman accepts new patients.  ----- Message -----  From: Prince Staley MA  Sent: 1/4/2023  10:19 AM CST  To: Elizabeth Lejeune, NP      ----- Message -----  From: Araceli Blue  Sent: 1/4/2023  10:09 AM CST  To: Lejeune Elizabeth Staff    Patient would like to consult with a nurse in regards to a referral for a pcp. The patient stated she previously made an appt with  but her and 's appts was canceled due to provider not accepting new pts. She is wanting to know if there someone that Shruthi highly recommends. Please call back at [phone]. Thanks r/s

## 2023-01-05 ENCOUNTER — TELEPHONE (OUTPATIENT)
Dept: INTERNAL MEDICINE | Facility: CLINIC | Age: 83
End: 2023-01-05
Payer: MEDICARE

## 2023-01-05 NOTE — TELEPHONE ENCOUNTER
----- Message from Donna Grayson sent at 1/5/2023 10:58 AM CST -----  Contact: Migdalia Booth is calling in regards to getting an appt.Please call back at [phone]        Thanks  ZACKARY

## 2023-01-12 ENCOUNTER — OFFICE VISIT (OUTPATIENT)
Dept: INTERNAL MEDICINE | Facility: CLINIC | Age: 83
End: 2023-01-12
Payer: MEDICARE

## 2023-01-12 ENCOUNTER — PATIENT OUTREACH (OUTPATIENT)
Dept: ADMINISTRATIVE | Facility: HOSPITAL | Age: 83
End: 2023-01-12
Payer: MEDICARE

## 2023-01-12 VITALS
BODY MASS INDEX: 24.06 KG/M2 | OXYGEN SATURATION: 98 % | HEIGHT: 65 IN | WEIGHT: 144.38 LBS | DIASTOLIC BLOOD PRESSURE: 72 MMHG | HEART RATE: 81 BPM | SYSTOLIC BLOOD PRESSURE: 126 MMHG | TEMPERATURE: 97 F

## 2023-01-12 DIAGNOSIS — G43.109 MIGRAINE WITH AURA AND WITHOUT STATUS MIGRAINOSUS, NOT INTRACTABLE: ICD-10-CM

## 2023-01-12 DIAGNOSIS — R06.02 SOB (SHORTNESS OF BREATH) ON EXERTION: ICD-10-CM

## 2023-01-12 DIAGNOSIS — I10 ESSENTIAL HYPERTENSION: Primary | ICD-10-CM

## 2023-01-12 DIAGNOSIS — R00.0 TACHYCARDIA: ICD-10-CM

## 2023-01-12 DIAGNOSIS — M81.0 OSTEOPOROSIS, UNSPECIFIED OSTEOPOROSIS TYPE, UNSPECIFIED PATHOLOGICAL FRACTURE PRESENCE: ICD-10-CM

## 2023-01-12 DIAGNOSIS — E78.2 MIXED HYPERLIPIDEMIA: ICD-10-CM

## 2023-01-12 PROCEDURE — 1126F AMNT PAIN NOTED NONE PRSNT: CPT | Mod: HCNC,CPTII,S$GLB, | Performed by: INTERNAL MEDICINE

## 2023-01-12 PROCEDURE — 99999 PR PBB SHADOW E&M-EST. PATIENT-LVL IV: CPT | Mod: PBBFAC,HCNC,, | Performed by: INTERNAL MEDICINE

## 2023-01-12 PROCEDURE — 3074F SYST BP LT 130 MM HG: CPT | Mod: HCNC,CPTII,S$GLB, | Performed by: INTERNAL MEDICINE

## 2023-01-12 PROCEDURE — 1159F MED LIST DOCD IN RCRD: CPT | Mod: HCNC,CPTII,S$GLB, | Performed by: INTERNAL MEDICINE

## 2023-01-12 PROCEDURE — 1101F PT FALLS ASSESS-DOCD LE1/YR: CPT | Mod: HCNC,CPTII,S$GLB, | Performed by: INTERNAL MEDICINE

## 2023-01-12 PROCEDURE — 1126F PR PAIN SEVERITY QUANTIFIED, NO PAIN PRESENT: ICD-10-PCS | Mod: HCNC,CPTII,S$GLB, | Performed by: INTERNAL MEDICINE

## 2023-01-12 PROCEDURE — 99999 PR PBB SHADOW E&M-EST. PATIENT-LVL IV: ICD-10-PCS | Mod: PBBFAC,HCNC,, | Performed by: INTERNAL MEDICINE

## 2023-01-12 PROCEDURE — 1101F PR PT FALLS ASSESS DOC 0-1 FALLS W/OUT INJ PAST YR: ICD-10-PCS | Mod: HCNC,CPTII,S$GLB, | Performed by: INTERNAL MEDICINE

## 2023-01-12 PROCEDURE — 3078F DIAST BP <80 MM HG: CPT | Mod: HCNC,CPTII,S$GLB, | Performed by: INTERNAL MEDICINE

## 2023-01-12 PROCEDURE — 99214 PR OFFICE/OUTPT VISIT, EST, LEVL IV, 30-39 MIN: ICD-10-PCS | Mod: HCNC,S$GLB,, | Performed by: INTERNAL MEDICINE

## 2023-01-12 PROCEDURE — 1160F RVW MEDS BY RX/DR IN RCRD: CPT | Mod: HCNC,CPTII,S$GLB, | Performed by: INTERNAL MEDICINE

## 2023-01-12 PROCEDURE — 3288F PR FALLS RISK ASSESSMENT DOCUMENTED: ICD-10-PCS | Mod: HCNC,CPTII,S$GLB, | Performed by: INTERNAL MEDICINE

## 2023-01-12 PROCEDURE — 99214 OFFICE O/P EST MOD 30 MIN: CPT | Mod: HCNC,S$GLB,, | Performed by: INTERNAL MEDICINE

## 2023-01-12 PROCEDURE — 3074F PR MOST RECENT SYSTOLIC BLOOD PRESSURE < 130 MM HG: ICD-10-PCS | Mod: HCNC,CPTII,S$GLB, | Performed by: INTERNAL MEDICINE

## 2023-01-12 PROCEDURE — 1159F PR MEDICATION LIST DOCUMENTED IN MEDICAL RECORD: ICD-10-PCS | Mod: HCNC,CPTII,S$GLB, | Performed by: INTERNAL MEDICINE

## 2023-01-12 PROCEDURE — 3078F PR MOST RECENT DIASTOLIC BLOOD PRESSURE < 80 MM HG: ICD-10-PCS | Mod: HCNC,CPTII,S$GLB, | Performed by: INTERNAL MEDICINE

## 2023-01-12 PROCEDURE — 1160F PR REVIEW ALL MEDS BY PRESCRIBER/CLIN PHARMACIST DOCUMENTED: ICD-10-PCS | Mod: HCNC,CPTII,S$GLB, | Performed by: INTERNAL MEDICINE

## 2023-01-12 PROCEDURE — 3288F FALL RISK ASSESSMENT DOCD: CPT | Mod: HCNC,CPTII,S$GLB, | Performed by: INTERNAL MEDICINE

## 2023-01-12 RX ORDER — OLMESARTAN MEDOXOMIL / AMLODIPINE BESYLATE / HYDROCHLOROTHIAZIDE 40; 10; 12.5 MG/1; MG/1; MG/1
TABLET, FILM COATED ORAL
COMMUNITY
Start: 2020-01-01

## 2023-01-12 RX ORDER — SENNOSIDES 8.6 MG/1
1 TABLET ORAL DAILY
COMMUNITY

## 2023-01-12 NOTE — PROGRESS NOTES
Subjective:       Patient ID: Migdalia Luna is a 82 y.o. female.    Chief Complaint: Establish Care    HPI Patient 82-year-old female previously followed by Dr. Pollock.  She is coming in to establish care.  She has a history of hypertension, hyperlipidemia, osteoporosis, migraines.  These issues have been generally stable over time and she is not having any major concerns with them at this point.  She is however having an acute issue is been going on over the last several months.      She describes a prior to this recent events that she has been extremely active.  She plays tennis 4 5 times a week and has had no problems doing so.  Over the last few months though she developed issues with shortness of breath and tachycardia and feeling of near syncope.  She was evaluated by Pulmonary for these symptoms and has no history of underlying smoking disease or smoking history.  Her PFTs did not show any significant abnormalities and pulmonary stress test showed maintenance of statin at oxygen saturations at % throughout.      She was referred to her cardiologist for further evaluation.  She had an echocardiogram which by her report did not show any significant issues.  She also had a chemical stress test performed which did not show any evidence of myocardial ischemia.  They did not do a treadmill study as they felt that she would not be able to attain target heart rates in the test would not be viable.    At this point she has continued to have issues with shortness of breath, near-syncope and lightheadedness.  These symptoms only occur when she is active.  It does not require her to do significant activity it can be minimal activity.  She states that the most recent event happened when she was walking out of [employer] and she felt tachycardic and lightheaded and dizzy.  She reports that her watches reporting pulse rates between 120 and 160 when these events taking place.  She cannot tell if the rhythm that she is  "experiencing is a root is abnormal.  She has not noted that.  The symptoms do not occur at rest.  It is only when she is exerting herself.    Review of Systems   Constitutional:  Negative for chills and fever.   HENT:  Negative for hearing loss.    Eyes:  Negative for photophobia and visual disturbance.   Respiratory:  Positive for shortness of breath. Negative for cough and wheezing.    Cardiovascular:  Positive for palpitations. Negative for chest pain.   Gastrointestinal:  Negative for blood in stool, constipation, nausea and vomiting.   Genitourinary:  Negative for dysuria and hematuria.   Musculoskeletal:  Negative for neck pain and neck stiffness.   Skin:  Negative for rash.   Neurological:  Positive for light-headedness. Negative for syncope, weakness and headaches.   Hematological:  Negative for adenopathy.   Psychiatric/Behavioral:  Negative for dysphoric mood. The patient is not nervous/anxious.      Objective:   /72   Pulse 81   Temp 96.7 °F (35.9 °C) (Temporal)   Ht 5' 5" (1.651 m)   Wt 65.5 kg (144 lb 6.4 oz)   SpO2 98%   BMI 24.03 kg/m²      Physical Exam  Vitals reviewed.   Constitutional:       General: She is not in acute distress.     Appearance: Normal appearance. She is well-developed. She is not ill-appearing.   HENT:      Head: Normocephalic and atraumatic.      Right Ear: External ear normal.      Left Ear: External ear normal.   Cardiovascular:      Rate and Rhythm: Normal rate and regular rhythm.      Heart sounds: No murmur heard.    No friction rub. No gallop.   Pulmonary:      Effort: Pulmonary effort is normal.      Breath sounds: Normal breath sounds. No wheezing or rales.   Chest:      Chest wall: No tenderness.   Abdominal:      General: Bowel sounds are normal. There is no distension.      Palpations: Abdomen is soft.      Tenderness: There is no abdominal tenderness.   Lymphadenopathy:      Cervical: No cervical adenopathy.   Skin:     Findings: No rash.   Neurological: "      General: No focal deficit present.      Mental Status: She is alert and oriented to person, place, and time.      Cranial Nerves: No cranial nerve deficit.       No visits with results within 2 Week(s) from this visit.   Latest known visit with results is:   Clinical Support on 11/10/2022   Component Date Value    Interpretation 11/10/2022                      Value:Spirometry is normal. Spirometry remains unimproved following bronchodilator. Lung volume determination is normal. Airway mechanics are abnormal showing increased airway resistance and decreased conductance. DLCO is mildly decreased. MVV is mildly   decreased.   Â   Notes:  Â   The failure to demonstrate improvement in spirometry does not preclude a clinical response to a trial of bronchodilators. No recent hemoglobin value available. DLCO interpretation assumes a normal hemoglobin value. MVV is reduced out of proportion to   FEV1 suggesting poor effort or difficulty performing the maneuver or neuromuscular disease.       Post FVC 11/10/2022 2.80     Post FEV1 11/10/2022 1.87     Post FEV1 FVC 11/10/2022 66.87     Post FEF 25 75 11/10/2022 0.90     Post PEF 11/10/2022 4.38     Post  11/10/2022 14.02     Pre DLCO 11/10/2022 12.78 (L)     DLCOVA PRE 11/10/2022 3.19     VA PRE 11/10/2022 4.01 (L)     IVC PRE 11/10/2022 2.47     Pre TLC 11/10/2022 5.20     VC PRE 11/10/2022 2.41     Pre FRC PL 11/10/2022 3.44     Pre ERV 11/10/2022 0.65     Pre RV 11/10/2022 2.79     RVTLC PRE 11/10/2022 53.59     Raw PRE 11/10/2022 5.81 (H)     Pre FVC 11/10/2022 2.41     Pre FEV1 11/10/2022 1.71     Pre FEV1 FVC 11/10/2022 70.99     Pre FEF 25 75 11/10/2022 1.05     Pre PEF 11/10/2022 3.50     Pre  11/10/2022 10.24     Pre MVV 11/10/2022 53.19 (L)     FVC Ref 11/10/2022 2.61     FVC LLN 11/10/2022 1.82     FVC Pre Ref 11/10/2022 92.4     FVC Post Ref 11/10/2022 107.3     FVC Chg 11/10/2022 16.1     FEV1 Ref 11/10/2022 1.98     FEV1 LLN 11/10/2022 1.38      FEV1 Pre Ref 11/10/2022 86.6     FEV1 Post Ref 11/10/2022 94.8     FEV1 Chg 11/10/2022 9.4     FEV1 FVC Ref 11/10/2022 77     FEV1 FVC LLN 11/10/2022 62     FEV1 FVC Pre Ref 11/10/2022 92.4     FEV1 FVC Post Ref 11/10/2022 87.1     FEV1 FVC Chg 11/10/2022 -5.8     FEF 25 75 Ref 11/10/2022 1.57     FEF 25 75 LLN 11/10/2022 0.65     FEF 25 75 Pre Ref 11/10/2022 66.9     FEF 25 75 Post Ref 11/10/2022 57.5     FEF 25 75 Chg 11/10/2022 -14.1     PEF Ref 11/10/2022 4.95     PEF LLN 11/10/2022 3.18     PEF Pre Ref 11/10/2022 70.8     PEF Post Ref 11/10/2022 88.5     PEF Chg 11/10/2022 24.9     UZB083 Chg 11/10/2022 36.9     MVV Ref 11/10/2022 77     MVV LLN 11/10/2022 65     MVV Pre Ref 11/10/2022 69.2     TLC Ref 11/10/2022 5.11     TLC LLN 11/10/2022 4.12     TLC Pre Ref 11/10/2022 101.8     VC Ref 11/10/2022 2.61     VC LLN 11/10/2022 1.82     VC Pre Ref 11/10/2022 92.4     FRCpleth Ref 11/10/2022 2.78     FRCpleth LLN 11/10/2022 1.96     FRCpleth PreRef 11/10/2022 123.8     ERV Ref 11/10/2022 0.51     ERV LLN 11/10/2022 -06783.49     ERV Pre Ref 11/10/2022 128.3     RV Ref 11/10/2022 2.27     RV LLN 11/10/2022 1.69     RV Pre Ref 11/10/2022 122.8     RVTLC Ref 11/10/2022 46     RVTLC LLN 11/10/2022 37     RVTLC Pre Ref 11/10/2022 116.1     Raw Ref 11/10/2022 3.06     Raw LLN 11/10/2022 3.06     Raw Pre Ref 11/10/2022 189.8     DLCO Single Breath Ref 11/10/2022 20.44     DLCO Single Breath LLN 11/10/2022 14.71     DLCO Single Breath Pre R* 11/10/2022 62.5     DLCOc Single Breath Ref 11/10/2022 20.44     DLCOc Single Breath LLN 11/10/2022 14.71     DLCOVA Ref 11/10/2022 4.00     DLCOVA LLN 11/10/2022 2.64     DLCOVA Pre Ref 11/10/2022 79.7     DLCOc SBVA Ref 11/10/2022 4.00     DLCOc SBVA LLN 11/10/2022 2.64     VA Single Breath Ref 11/10/2022 4.96     VA Single Breath LLN 11/10/2022 4.96     VA Single Breath Pre Ref 11/10/2022 80.8     IVC Single Breath Ref 11/10/2022 2.61     IVC Single Breath LLN 11/10/2022 1.82      IVC Single Breath Pre Ref 11/10/2022 94.7        Assessment:       1. Essential hypertension    2. Mixed hyperlipidemia    3. Osteoporosis, unspecified osteoporosis type, unspecified pathological fracture presence    4. Migraine with aura and without status migrainosus, not intractable    5. SOB (shortness of breath) on exertion    6. Tachycardia          Plan:   No problem-specific Assessment & Plan notes found for this encounter.    Essential hypertension  Comments:  stable, continue current medications.  no bp issues    Mixed hyperlipidemia  Comments:  No medication at this time.  continue following labs periodically.    Osteoporosis, unspecified osteoporosis type, unspecified pathological fracture presence  Comments:  Continue alendronate, stable.    Migraine with aura and without status migrainosus, not intractable  Comments:  continue sumatriptan    SOB (shortness of breath) on exertion    Tachycardia  Comments:  Follow up with Dr. Maradiaga and consider treadmill assessment for arrythmia under exertion as that is the consistent symptom    Patient is having ongoing issues with shortness of breath associated with tachycardia with exertion.  Her pulmonary workup was negative and cardiac stent chemical stress testing and echo cardiograms were reported as negative.  The pattern that she is describing in the consistency with which she describes it makes me suspicious that she is having an arrhythmia.  It would appear that when she exerts herself her pulse rate is shooting up and she is getting these symptoms of lightheadedness and dizziness and near syncope.  We have not in the testing that she has had done been able to stimulate this response at this time.  I discussed with her the possibility of a Holter monitor but she indicated she is seeing her cardiologist on Monday.  We would not have the results back by the time she saw him.  I believe there may be benefit by putting her on the treadmill under supervision  with EKG monitoring and seeing at the symptoms can be simulated in the at lab.  That would certainly give us the best evidence of what is going on real time and allow for hopefully for diagnosis and and a treatment plan.  She will follow-up with me in a few weeks.  We will for the this note to her cardiologist and she will discuss this with him on Monday the possibility of doing further testing with walking treadmill.      Follow up in about 6 weeks (around 2/23/2023).

## 2023-02-01 ENCOUNTER — TELEPHONE (OUTPATIENT)
Dept: INTERNAL MEDICINE | Facility: CLINIC | Age: 83
End: 2023-02-01
Payer: MEDICARE

## 2023-02-01 DIAGNOSIS — R06.02 SOB (SHORTNESS OF BREATH) ON EXERTION: ICD-10-CM

## 2023-02-01 DIAGNOSIS — R55 NEAR SYNCOPE: ICD-10-CM

## 2023-02-01 DIAGNOSIS — R06.02 SOB (SHORTNESS OF BREATH): Primary | ICD-10-CM

## 2023-02-01 NOTE — TELEPHONE ENCOUNTER
----- Message from Alondra Cole sent at 2/1/2023  2:34 PM CST -----  Contact: Patient, [phone]  Calling to speak with the nurse regarding her cardiologist told her a walking stress test was not necessary. Please call her. Thanks.

## 2023-02-01 NOTE — TELEPHONE ENCOUNTER
----- Message from Mare Oliveira sent at 2/1/2023  2:53 PM CST -----  Contact: Self  ..Type:  Patient Returning Call    Who Called: .Migdalia Luna  Who Left Message for Patient:  Does the patient know what this is regarding?:   Would the patient rather a call back or a response via [facility]? Cem dimas   Best Call Back Number: .[phone]   Additional Information: pt states she missed a call

## 2023-02-06 ENCOUNTER — HOSPITAL ENCOUNTER (OUTPATIENT)
Dept: CARDIOLOGY | Facility: HOSPITAL | Age: 83
Discharge: HOME | End: 2023-02-06
Attending: INTERNAL MEDICINE
Payer: MEDICARE

## 2023-02-06 DIAGNOSIS — R55 NEAR SYNCOPE: ICD-10-CM

## 2023-02-06 DIAGNOSIS — R06.02 SOB (SHORTNESS OF BREATH) ON EXERTION: ICD-10-CM

## 2023-02-06 LAB
CV STRESS BASE HR: 82 BPM
DIASTOLIC BLOOD PRESSURE: 85 MMHG
OHS CV CPX 1 MINUTE RECOVERY HEART RATE: 112 BPM
OHS CV CPX 85 PERCENT MAX PREDICTED HEART RATE MALE: 115
OHS CV CPX ESTIMATED METS: 5
OHS CV CPX MAX PREDICTED HEART RATE: 135
OHS CV CPX PATIENT IS FEMALE: 1
OHS CV CPX PATIENT IS MALE: 0
OHS CV CPX PEAK DIASTOLIC BLOOD PRESSURE: 89 MMHG
OHS CV CPX PEAK HEAR RATE: 118 BPM
OHS CV CPX PEAK RATE PRESSURE PRODUCT: NORMAL
OHS CV CPX PEAK SYSTOLIC BLOOD PRESSURE: 182 MMHG
OHS CV CPX PERCENT MAX PREDICTED HEART RATE ACHIEVED: 88
OHS CV CPX RATE PRESSURE PRODUCT PRESENTING: NORMAL
STRESS ECHO POST EXERCISE DUR MIN: 8 MINUTES
STRESS ECHO POST EXERCISE DUR SEC: 29 SECONDS
SYSTOLIC BLOOD PRESSURE: 137 MMHG

## 2023-02-06 PROCEDURE — 93018 EXERCISE STRESS - EKG (CUPID ONLY): ICD-10-PCS | Mod: HCNC,,, | Performed by: INTERNAL MEDICINE

## 2023-02-06 PROCEDURE — 93016 CV STRESS TEST SUPVJ ONLY: CPT | Mod: HCNC,,, | Performed by: INTERNAL MEDICINE

## 2023-02-06 PROCEDURE — 93017 CV STRESS TEST TRACING ONLY: CPT | Mod: HCNC

## 2023-02-06 PROCEDURE — 93018 CV STRESS TEST I&R ONLY: CPT | Mod: HCNC,,, | Performed by: INTERNAL MEDICINE

## 2023-02-06 PROCEDURE — 93016 EXERCISE STRESS - EKG (CUPID ONLY): ICD-10-PCS | Mod: HCNC,,, | Performed by: INTERNAL MEDICINE

## 2023-02-07 ENCOUNTER — OFFICE VISIT (OUTPATIENT)
Dept: GASTROENTEROLOGY | Facility: CLINIC | Age: 83
End: 2023-02-07
Payer: MEDICARE

## 2023-02-07 VITALS
SYSTOLIC BLOOD PRESSURE: 128 MMHG | WEIGHT: 143.94 LBS | DIASTOLIC BLOOD PRESSURE: 86 MMHG | HEIGHT: 65 IN | OXYGEN SATURATION: 98 % | BODY MASS INDEX: 23.98 KG/M2 | HEART RATE: 74 BPM

## 2023-02-07 DIAGNOSIS — R11.10 VOMITING, UNSPECIFIED VOMITING TYPE, UNSPECIFIED WHETHER NAUSEA PRESENT: ICD-10-CM

## 2023-02-07 DIAGNOSIS — R13.10 DYSPHAGIA, UNSPECIFIED TYPE: Primary | ICD-10-CM

## 2023-02-07 DIAGNOSIS — K52.9 COLITIS: ICD-10-CM

## 2023-02-07 PROCEDURE — 1101F PT FALLS ASSESS-DOCD LE1/YR: CPT | Mod: HCNC,CPTII,S$GLB, | Performed by: INTERNAL MEDICINE

## 2023-02-07 PROCEDURE — 3288F FALL RISK ASSESSMENT DOCD: CPT | Mod: HCNC,CPTII,S$GLB, | Performed by: INTERNAL MEDICINE

## 2023-02-07 PROCEDURE — 1101F PR PT FALLS ASSESS DOC 0-1 FALLS W/OUT INJ PAST YR: ICD-10-PCS | Mod: HCNC,CPTII,S$GLB, | Performed by: INTERNAL MEDICINE

## 2023-02-07 PROCEDURE — 3074F PR MOST RECENT SYSTOLIC BLOOD PRESSURE < 130 MM HG: ICD-10-PCS | Mod: HCNC,CPTII,S$GLB, | Performed by: INTERNAL MEDICINE

## 2023-02-07 PROCEDURE — 3288F PR FALLS RISK ASSESSMENT DOCUMENTED: ICD-10-PCS | Mod: HCNC,CPTII,S$GLB, | Performed by: INTERNAL MEDICINE

## 2023-02-07 PROCEDURE — 3074F SYST BP LT 130 MM HG: CPT | Mod: HCNC,CPTII,S$GLB, | Performed by: INTERNAL MEDICINE

## 2023-02-07 PROCEDURE — 1160F PR REVIEW ALL MEDS BY PRESCRIBER/CLIN PHARMACIST DOCUMENTED: ICD-10-PCS | Mod: HCNC,CPTII,S$GLB, | Performed by: INTERNAL MEDICINE

## 2023-02-07 PROCEDURE — 1159F PR MEDICATION LIST DOCUMENTED IN MEDICAL RECORD: ICD-10-PCS | Mod: HCNC,CPTII,S$GLB, | Performed by: INTERNAL MEDICINE

## 2023-02-07 PROCEDURE — 99204 OFFICE O/P NEW MOD 45 MIN: CPT | Mod: HCNC,S$GLB,, | Performed by: INTERNAL MEDICINE

## 2023-02-07 PROCEDURE — 1159F MED LIST DOCD IN RCRD: CPT | Mod: HCNC,CPTII,S$GLB, | Performed by: INTERNAL MEDICINE

## 2023-02-07 PROCEDURE — 99999 PR PBB SHADOW E&M-EST. PATIENT-LVL IV: CPT | Mod: PBBFAC,HCNC,, | Performed by: INTERNAL MEDICINE

## 2023-02-07 PROCEDURE — 1160F RVW MEDS BY RX/DR IN RCRD: CPT | Mod: HCNC,CPTII,S$GLB, | Performed by: INTERNAL MEDICINE

## 2023-02-07 PROCEDURE — 99999 PR PBB SHADOW E&M-EST. PATIENT-LVL IV: ICD-10-PCS | Mod: PBBFAC,HCNC,, | Performed by: INTERNAL MEDICINE

## 2023-02-07 PROCEDURE — 3079F DIAST BP 80-89 MM HG: CPT | Mod: HCNC,CPTII,S$GLB, | Performed by: INTERNAL MEDICINE

## 2023-02-07 PROCEDURE — 3079F PR MOST RECENT DIASTOLIC BLOOD PRESSURE 80-89 MM HG: ICD-10-PCS | Mod: HCNC,CPTII,S$GLB, | Performed by: INTERNAL MEDICINE

## 2023-02-07 PROCEDURE — 99204 PR OFFICE/OUTPT VISIT, NEW, LEVL IV, 45-59 MIN: ICD-10-PCS | Mod: HCNC,S$GLB,, | Performed by: INTERNAL MEDICINE

## 2023-02-07 RX ORDER — FAMOTIDINE 20 MG/1
40 TABLET, FILM COATED ORAL DAILY
Qty: 180 TABLET | Refills: 0 | Status: SHIPPED | OUTPATIENT
Start: 2023-02-07 | End: 2023-05-15

## 2023-02-07 NOTE — PROGRESS NOTES
Clinic Consult:  [facility] Consultation Note    Reason for Consult:  The primary encounter diagnosis was Dysphagia, unspecified type. Diagnoses of Colitis and Vomiting, unspecified vomiting type, unspecified whether nausea present were also pertinent to this visit.    PCP: John Allen   [id number] [facility] / [place] [id number]    HPI:  This is a 83 y.o. female here for evaluation of dysphagia.   She feels like food gets stuck.   This has been occurring for one year.    She endorses GERD symptoms.   She has burning and regurgitation.     Had diarrhea in July 2022.   Was seen in ED and told she had an infection.   Diarrhea has now resolved.     Known paraesophageal hernia.   Previously seen by Dr Micah Kincaid.       ROS:  As above HPI       Medical History:   Past Medical History:   Diagnosis Date    Anemia     Arthritis     left shoulder    Asthma     Chronic bronchitis     Depression     Essential hypertension     GERD (gastroesophageal reflux disease)     History of colon polyps     Due for colonoscopy 2021    Hyperlipidemia     Insomnia     Migraine     Osteoporosis     started on fosamax 10/22    Thyroid disease        Surgical History:  Past Surgical History:   Procedure Laterality Date    BACK SURGERY      epidural    BREAST BIOPSY Right over 10 yrs ago    benign    COLONOSCOPY N/A 7/11/2016    Procedure: COLONOSCOPY;  Surgeon: Yordy Penn MD;  Location: [facility];  Service: Endoscopy;  Laterality: N/A;    KNEE ARTHROPLASTY      KNEE ARTHROSCOPY      bilaterly    TONSILLECTOMY         Family History:   Family History   Problem Relation Age of Onset    Diabetes Mother     Asthma Sister     Diabetes Sister     Asthma Brother     Diabetes Brother     Cancer Paternal Aunt     Hypertension Paternal Aunt     Breast cancer Paternal Aunt     Cancer Paternal Uncle     Hypertension Paternal Uncle     Heart failure Paternal Grandmother     Hypertension Paternal Grandmother     Hypertension  "Paternal Grandfather     Breast cancer Paternal Cousin     Breast cancer Paternal Cousin     Breast cancer Paternal Aunt     Ovarian cancer Paternal Aunt        Social History:   Social History     Tobacco Use    Smoking status: Never    Smokeless tobacco: Never   Substance Use Topics    Alcohol use: No    Drug use: No       Allergies: Reviewed    Home Medications:   Medication List with Changes/Refills   New Medications    FAMOTIDINE (PEPCID) 20 MG TABLET    Take 2 tablets (40 mg total) by mouth once daily.   Current Medications    ALENDRONATE (FOSAMAX) 70 MG TABLET    Take 1 tablet (70 mg total) by mouth every 7 days.    MULTIVITAMIN CAPSULE    Take 1 capsule by mouth once daily.    OLMESARTAN-AMLODIPIN-HCTHIAZID 40-10-12.5 MG TAB        SENNA (SENOKOT) 8.6 MG TABLET    Take 1 tablet by mouth once daily.    SUMATRIPTAN (IMITREX) 100 MG TABLET    TAKE 1 TABLET WITH ONSET OF HEADACHE, MAY REPEAT ONE TIME TWO HOURS LATER IF NEEDED    ZOLPIDEM (AMBIEN) 10 MG TAB    Take 1 tablet (10 mg total) by mouth nightly as needed.         Physical Exam:  Vital Signs:  /86   Pulse 74   Ht 5' 5" (1.651 m)   Wt 65.3 kg (143 lb 15.4 oz)   SpO2 98%   BMI 23.96 kg/m²   Body mass index is 23.96 kg/m².    Physical Exam  Constitutional:       Appearance: Normal appearance.   HENT:      Head: Normocephalic.   Eyes:      Conjunctiva/sclera: Conjunctivae normal.   Pulmonary:      Effort: Pulmonary effort is normal.   Abdominal:      General: There is no distension.      Palpations: Abdomen is soft.      Tenderness: There is no abdominal tenderness. There is no guarding.   Neurological:      Mental Status: She is alert.        Labs: Pertinent labs reviewed.        Assessment:  Problem List Items Addressed This Visit          GI    Dysphagia - Primary    Current Assessment & Plan     Plan:   -Will order an EGD   -Will start on pepcid 40mg           Other Visit Diagnoses                        Dysphagia, unspecified type  -     " famotidine (PEPCID) 20 MG tablet; Take 2 tablets (40 mg total) by mouth once daily.  Dispense: 180 tablet; Refill: 0  -     Ambulatory referral/consult to Endo Procedure ; Future; Expected date: 02/08/2023      Vomiting, unspecified vomiting type, unspecified whether nausea present  -     Ambulatory referral/consult to Gastroenterology                No follow-ups on file.    Order summary:  Orders Placed This Encounter   Procedures    Ambulatory referral/consult to Endo Procedure        Thank you so much for allowing me to participate in the care of Migdalia Palmer MD  Gastroenterology and Hepatology  [facility]

## 2023-02-09 DIAGNOSIS — Z00.00 ENCOUNTER FOR MEDICARE ANNUAL WELLNESS EXAM: ICD-10-CM

## 2023-02-13 ENCOUNTER — HOSPITAL ENCOUNTER (OUTPATIENT)
Dept: PREADMISSION TESTING | Facility: HOSPITAL | Age: 83
Discharge: HOME | End: 2023-02-13
Attending: INTERNAL MEDICINE
Payer: MEDICARE

## 2023-02-13 DIAGNOSIS — R13.10 DYSPHAGIA, UNSPECIFIED TYPE: Primary | ICD-10-CM

## 2023-02-15 ENCOUNTER — PATIENT MESSAGE (OUTPATIENT)
Dept: GASTROENTEROLOGY | Facility: CLINIC | Age: 83
End: 2023-02-15
Payer: MEDICARE

## 2023-02-15 PROBLEM — R13.10 DYSPHAGIA: Status: ACTIVE | Noted: 2023-02-15

## 2023-02-20 RX ORDER — HYDROCODONE BITARTRATE AND ACETAMINOPHEN 7.5; 325 MG/1; MG/1
TABLET ORAL
COMMUNITY
Start: 2023-01-12 | End: 2023-02-27

## 2023-02-20 RX ORDER — TRAZODONE HYDROCHLORIDE 50 MG/1
TABLET ORAL
COMMUNITY
Start: 2023-01-14 | End: 2023-03-29

## 2023-02-20 RX ORDER — OLMESARTAN MEDOXOMIL AND HYDROCHLOROTHIAZIDE 40/12.5 40; 12.5 MG/1; MG/1
TABLET ORAL
COMMUNITY
Start: 2023-01-03

## 2023-02-20 RX ORDER — HYDROCODONE BITARTRATE AND ACETAMINOPHEN 7.5; 325 MG/1; MG/1
TABLET ORAL
COMMUNITY
Start: 2022-12-27 | End: 2024-02-08

## 2023-02-20 RX ORDER — BUPIVACAINE HYDROCHLORIDE 5 MG/ML
INJECTION, SOLUTION PERINEURAL
COMMUNITY
Start: 2022-12-13

## 2023-02-21 ENCOUNTER — ANESTHESIA EVENT (OUTPATIENT)
Dept: ENDOSCOPY | Facility: HOSPITAL | Age: 83
End: 2023-02-21
Payer: MEDICARE

## 2023-02-21 NOTE — ANESTHESIA PREPROCEDURE EVALUATION
02/21/2023  Migdalia Luna is a 83 y.o., female.  Past Medical History:   Diagnosis Date    Anemia     Arthritis     left shoulder    Asthma     Chronic bronchitis     Depression     Essential hypertension     GERD (gastroesophageal reflux disease)     History of colon polyps     Due for colonoscopy 2021    Hyperlipidemia     Insomnia     Migraine     Osteoporosis     started on fosamax 10/22    Thyroid disease        Past Surgical History:   Procedure Laterality Date    BACK SURGERY      epidural    BREAST BIOPSY Right over 10 yrs ago    benign    COLONOSCOPY N/A 7/11/2016    Procedure: COLONOSCOPY;  Surgeon: Yordy Penn MD;  Location: [facility];  Service: Endoscopy;  Laterality: N/A;    KNEE ARTHROPLASTY      KNEE ARTHROSCOPY      bilaterly    TONSILLECTOMY         2/2023      The ECG portion of the study is negative for ischemia.    The patient reported no chest pain during the stress test.    The blood pressure response to stress was normal.    During stress, occasional PACs are noted. , During stress, occasional PVCs are noted    2013  CONCLUSIONS     1 - Normal left ventricular systolic function (EF 55%).     2 - Normal left ventricular diastolic function.     3 - Normal right ventricular systolic function .     4 - Increased central venous pressure.       Pre-op Assessment    I have reviewed the Patient Summary Reports.     I have reviewed the Nursing Notes. I have reviewed the NPO Status.   I have reviewed the Medications.     Review of Systems  Anesthesia Hx:  No problems with previous Anesthesia  Denies Family Hx of Anesthesia complications.   Denies Personal Hx of Anesthesia complications.   Social:  Non-Smoker, No Alcohol Use    Hematology/Oncology:  Hematology Normal   Oncology Normal     EENT/Dental:EENT/Dental Normal   Cardiovascular:   Exercise tolerance: good  Hypertension, well controlled    Pulmonary:   Denies Asthma.    Renal/:  Renal/ Normal     Hepatic/GI:   GERD, poorly controlled    Musculoskeletal:  Musculoskeletal Normal    Neurological:   Headaches    Endocrine:  Endocrine Normal    Dermatological:  Skin Normal    Psych:   Psychiatric History depression          Physical Exam  General: Well nourished, Cooperative, Alert and Oriented    Airway:  Mallampati: II   Mouth Opening: Normal  TM Distance: 4 - 6 cm  Tongue: Normal  Neck ROM: Normal ROM    Dental:  Intact, Partial Dentures    Chest/Lungs:  Normal Respiratory Rate        Anesthesia Plan  Type of Anesthesia, risks & benefits discussed:    Anesthesia Type: Gen Natural Airway  Intra-op Monitoring Plan: Standard ASA Monitors  Post Op Pain Control Plan: multimodal analgesia  Induction:  IV  Informed Consent: Informed consent signed with the Patient and all parties understand the risks and agree with anesthesia plan.  All questions answered.   ASA Score: 2  Day of Surgery Review of History & Physical: H&P Update referred to the surgeon/provider.    Ready For Surgery From Anesthesia Perspective.     .

## 2023-02-23 ENCOUNTER — ANESTHESIA (OUTPATIENT)
Dept: ENDOSCOPY | Facility: HOSPITAL | Age: 83
End: 2023-02-23
Payer: MEDICARE

## 2023-02-23 ENCOUNTER — HOSPITAL ENCOUNTER (OUTPATIENT)
Facility: HOSPITAL | Age: 83
Discharge: HOME | End: 2023-02-23
Attending: INTERNAL MEDICINE | Admitting: INTERNAL MEDICINE
Payer: MEDICARE

## 2023-02-23 VITALS
DIASTOLIC BLOOD PRESSURE: 64 MMHG | HEART RATE: 69 BPM | TEMPERATURE: 98 F | RESPIRATION RATE: 17 BRPM | BODY MASS INDEX: 23.01 KG/M2 | WEIGHT: 138.13 LBS | SYSTOLIC BLOOD PRESSURE: 132 MMHG | OXYGEN SATURATION: 99 % | HEIGHT: 65 IN

## 2023-02-23 DIAGNOSIS — R13.10 DYSPHAGIA, UNSPECIFIED TYPE: Primary | ICD-10-CM

## 2023-02-23 DIAGNOSIS — R13.10 DYSPHAGIA: ICD-10-CM

## 2023-02-23 PROCEDURE — 37000008 HC ANESTHESIA 1ST 15 MINUTES: Mod: HCNC | Performed by: INTERNAL MEDICINE

## 2023-02-23 PROCEDURE — 63600175 PHARM REV CODE 636 W HCPCS: Mod: HCNC | Performed by: INTERNAL MEDICINE

## 2023-02-23 PROCEDURE — 88305 TISSUE EXAM BY PATHOLOGIST: ICD-10-PCS | Mod: 26,HCNC,, | Performed by: PATHOLOGY

## 2023-02-23 PROCEDURE — 63600175 PHARM REV CODE 636 W HCPCS: Mod: HCNC | Performed by: NURSE ANESTHETIST, CERTIFIED REGISTERED

## 2023-02-23 PROCEDURE — 88305 TISSUE EXAM BY PATHOLOGIST: CPT | Mod: 26,HCNC,, | Performed by: PATHOLOGY

## 2023-02-23 PROCEDURE — D9220A PRA ANESTHESIA: ICD-10-PCS | Mod: HCNC,,, | Performed by: NURSE ANESTHETIST, CERTIFIED REGISTERED

## 2023-02-23 PROCEDURE — 43239 EGD BIOPSY SINGLE/MULTIPLE: CPT | Mod: HCNC | Performed by: INTERNAL MEDICINE

## 2023-02-23 PROCEDURE — 88305 TISSUE EXAM BY PATHOLOGIST: CPT | Mod: 59,HCNC | Performed by: PATHOLOGY

## 2023-02-23 PROCEDURE — D9220A PRA ANESTHESIA: Mod: HCNC,,, | Performed by: NURSE ANESTHETIST, CERTIFIED REGISTERED

## 2023-02-23 PROCEDURE — 43239 PR EGD, FLEX, W/BIOPSY, SGL/MULTI: ICD-10-PCS | Mod: HCNC,,, | Performed by: INTERNAL MEDICINE

## 2023-02-23 PROCEDURE — 25000003 PHARM REV CODE 250: Mod: HCNC | Performed by: NURSE ANESTHETIST, CERTIFIED REGISTERED

## 2023-02-23 PROCEDURE — 43239 EGD BIOPSY SINGLE/MULTIPLE: CPT | Mod: HCNC,,, | Performed by: INTERNAL MEDICINE

## 2023-02-23 PROCEDURE — 27201012 HC FORCEPS, HOT/COLD, DISP: Mod: HCNC | Performed by: INTERNAL MEDICINE

## 2023-02-23 RX ORDER — PROPOFOL 10 MG/ML
VIAL (ML) INTRAVENOUS
Status: DISCONTINUED | OUTPATIENT
Start: 2023-02-23 | End: 2023-02-23

## 2023-02-23 RX ORDER — SODIUM CHLORIDE, SODIUM LACTATE, POTASSIUM CHLORIDE, CALCIUM CHLORIDE 600; 310; 30; 20 MG/100ML; MG/100ML; MG/100ML; MG/100ML
INJECTION, SOLUTION INTRAVENOUS CONTINUOUS
Status: DISCONTINUED | OUTPATIENT
Start: 2023-02-23 | End: 2023-02-23 | Stop reason: HOSPADM

## 2023-02-23 RX ORDER — LIDOCAINE HYDROCHLORIDE 20 MG/ML
INJECTION, SOLUTION EPIDURAL; INFILTRATION; INTRACAUDAL; PERINEURAL
Status: DISCONTINUED | OUTPATIENT
Start: 2023-02-23 | End: 2023-02-23

## 2023-02-23 RX ADMIN — PROPOFOL 120 MG: 10 INJECTION, EMULSION INTRAVENOUS at 10:02

## 2023-02-23 RX ADMIN — PROPOFOL 40 MG: 10 INJECTION, EMULSION INTRAVENOUS at 10:02

## 2023-02-23 RX ADMIN — LIDOCAINE HYDROCHLORIDE 40 MG: 20 INJECTION, SOLUTION EPIDURAL; INFILTRATION; INTRACAUDAL; PERINEURAL at 10:02

## 2023-02-23 RX ADMIN — SODIUM CHLORIDE, POTASSIUM CHLORIDE, SODIUM LACTATE AND CALCIUM CHLORIDE: 600; 310; 30; 20 INJECTION, SOLUTION INTRAVENOUS at 10:02

## 2023-02-23 NOTE — ANESTHESIA POSTPROCEDURE EVALUATION
Anesthesia Post Evaluation    Patient: Migdalia Luna    Procedure(s) Performed: Procedure(s) (LRB):  EGD (ESOPHAGOGASTRODUODENOSCOPY) (N/A)    Final Anesthesia Type: general      Patient location during evaluation: PACU  Patient participation: Yes- Able to Participate  Level of consciousness: awake and alert and oriented  Post-procedure vital signs: reviewed and stable  Pain management: adequate  Airway patency: patent    PONV status at discharge: No PONV  Anesthetic complications: no      Cardiovascular status: blood pressure returned to baseline, stable and hemodynamically stable  Respiratory status: unassisted  Hydration status: euvolemic  Follow-up not needed.          Vitals Value Taken Time   /64 02/23/23 1110   Temp 36.7 °C (98 °F) 02/23/23 1109   Pulse 69 02/23/23 1109   Resp 17 02/23/23 1109   SpO2 99 % 02/23/23 1109         No case tracking events are documented in the log.      Pain/Bren Score: Bren Score: 10 (2/23/2023 11:10 AM)

## 2023-02-23 NOTE — H&P
PRE PROCEDURE H&P    Patient Name: Migdalia Luna  MRN: [id number]  : 1940  Date of Procedure:  2023  Referring Physician: Yao Palmer MD  Primary Physician: John Allen MD  Procedure Physician: Yao Palmer MD       Planned Procedure: EGD  Diagnosis: dysphagia  Chief Complaint: Same as above    HPI: Patient is an 83 y.o. female is here for the above.       Anticoagulation: None     Past Medical History:   Past Medical History:   Diagnosis Date    Anemia     Arthritis     left shoulder    Asthma     Chronic bronchitis     Depression     Essential hypertension     GERD (gastroesophageal reflux disease)     History of colon polyps     Due for colonoscopy     Hyperlipidemia     Insomnia     Migraine     Osteoporosis     started on fosamax 10/22    Thyroid disease         Past Surgical History:  Past Surgical History:   Procedure Laterality Date    BACK SURGERY      epidural    BREAST BIOPSY Right over 10 yrs ago    benign    COLONOSCOPY N/A 2016    Procedure: COLONOSCOPY;  Surgeon: Yordy Penn MD;  Location: [facility];  Service: Endoscopy;  Laterality: N/A;    KNEE ARTHROPLASTY      KNEE ARTHROSCOPY      bilaterly    TONSILLECTOMY          Home Medications:  Prior to Admission medications    Medication Sig Start Date End Date Taking? Authorizing Provider   alendronate (FOSAMAX) 70 MG tablet Take 1 tablet (70 mg total) by mouth every 7 days. 10/22/22 10/22/23 Yes Jossue Pollock MD   famotidine (PEPCID) 20 MG tablet Take 2 tablets (40 mg total) by mouth once daily. 23 Yes Yao Plamer MD   HYDROcodone-acetaminophen (NORCO) 7.5-325 mg per tablet  22  Yes Historical Provider   HYDROcodone-acetaminophen (NORCO) 7.5-325 mg per tablet  23  Yes Historical Provider   multivitamin capsule Take 1 capsule by mouth once daily.   Yes Historical Provider   olmesartan-amLODIPin-hcthiazid 40-10-12.5 mg Tab  20  Yes Historical Provider    olmesartan-hydrochlorothiazide (BENICAR HCT) 40-12.5 mg Tab  1/3/23  Yes Historical Provider   senna (SENOKOT) 8.6 mg tablet Take 1 tablet by mouth once daily.   Yes Historical Provider   sumatriptan (IMITREX) 100 MG tablet TAKE 1 TABLET WITH ONSET OF HEADACHE, MAY REPEAT ONE TIME TWO HOURS LATER IF NEEDED 8/30/22  Yes Jossue Pollock MD   traZODone (DESYREL) 50 MG tablet  1/14/23  Yes Historical Provider   zolpidem (AMBIEN) 10 mg Tab Take 1 tablet (10 mg total) by mouth nightly as needed. 8/30/22  Yes Jossue Pollock MD   BUPivacaine (MARCAINE) 0.5 % (5 mg/mL) injection  12/13/22   Historical Provider        Allergies:  Review of patient's allergies indicates:  No Known Allergies     Social History:   Social History     Socioeconomic History    Marital status:    Occupational History    Occupation: retired     Employer: OTHER   Tobacco Use    Smoking status: Never    Smokeless tobacco: Never   Substance and Sexual Activity    Alcohol use: No    Drug use: No    Sexual activity: Not Currently   Social History Narrative    Patient is retired.     Social Determinants of Health     Financial Resource Strain: Low Risk     Difficulty of Paying Living Expenses: Not hard at all   Food Insecurity: No Food Insecurity    Worried About Running Out of Food in the Last Year: Never true    Ran Out of Food in the Last Year: Never true   Transportation Needs: No Transportation Needs    Lack of Transportation (Medical): No    Lack of Transportation (Non-Medical): No   Physical Activity: Inactive    Days of Exercise per Week: 0 days    Minutes of Exercise per Session: 0 min   Stress: Stress Concern Present    Feeling of Stress : To some extent   Social Connections: Unknown    Frequency of Communication with Friends and Family: More than three times a week    Frequency of Social Gatherings with Friends and Family: Three times a week    Active Member of Clubs or Organizations: Yes    Attends Club or Organization Meetings:  "More than 4 times per year    Marital Status:    Housing Stability: Low Risk     Unable to Pay for Housing in the Last Year: No    Number of Places Lived in the Last Year: 1    Unstable Housing in the Last Year: No       Family History:  Family History   Problem Relation Age of Onset    Diabetes Mother     Asthma Sister     Diabetes Sister     Asthma Brother     Diabetes Brother     Cancer Paternal Aunt     Hypertension Paternal Aunt     Breast cancer Paternal Aunt     Cancer Paternal Uncle     Hypertension Paternal Uncle     Heart failure Paternal Grandmother     Hypertension Paternal Grandmother     Hypertension Paternal Grandfather     Breast cancer Paternal Cousin     Breast cancer Paternal Cousin     Breast cancer Paternal Aunt     Ovarian cancer Paternal Aunt        ROS: No acute cardiac events, no acute respiratory complaints.     Physical Exam (all patients):    BP (!) 140/80 (Patient Position: Sitting)   Pulse 72   Temp 97.1 °F (36.2 °C) (Temporal)   Resp 18   Ht 5' 5" (1.651 m)   Wt 62.6 kg (138 lb 1.9 oz)   SpO2 98%   Breastfeeding No   BMI 22.98 kg/m²   Lungs: Clear to auscultation bilaterally, respirations unlabored  Heart: Regular rate and rhythm, S1 and S2 normal, no obvious murmurs  Abdomen:         Soft, non-tender, bowel sounds normal, no masses, no organomegaly    Lab Results   Component Value Date    WBC 5.19 07/27/2022     (H) 07/27/2022    RDW 13.9 07/27/2022     (H) 07/27/2022    INR 1.0 06/09/2011    GLU 89 07/27/2022    BUN 16 07/27/2022     07/27/2022    K 4.4 07/27/2022     07/27/2022        SEDATION PLAN: per anesthesia      History reviewed, vital signs satisfactory, cardiopulmonary status satisfactory, sedation options, risks and plans have been discussed with the patient  All their questions were answered and the patient agrees to the sedation procedures as planned and the patient is deemed an appropriate candidate for the sedation as " planned.    Procedure explained to patient, informed consent obtained and placed in chart.    Yao Palmer  2/23/2023  10:32 AM

## 2023-02-23 NOTE — TRANSFER OF CARE
"Anesthesia Transfer of Care Note    Patient: Migdalia Luna    Procedure(s) Performed: Procedure(s) (LRB):  EGD (ESOPHAGOGASTRODUODENOSCOPY) (N/A)    Patient location: PACU    Anesthesia Type: general    Transport from OR: Transported from OR on room air with adequate spontaneous ventilation    Post pain: adequate analgesia    Post assessment: no apparent anesthetic complications and tolerated procedure well    Post vital signs: stable    Level of consciousness: awake    Nausea/Vomiting: no nausea/vomiting    Complications: none    Transfer of care protocol was followed      Last vitals:   Visit Vitals  BP (!) 140/80 (Patient Position: Sitting)   Pulse 72   Temp 36.2 °C (97.1 °F) (Temporal)   Resp 18   Ht 5' 5" (1.651 m)   Wt 62.6 kg (138 lb 1.9 oz)   SpO2 98%   Breastfeeding No   BMI 22.98 kg/m²     "

## 2023-02-23 NOTE — PROVATION PATIENT INSTRUCTIONS
Discharge Summary/Instructions after an Endoscopic Procedure  Patient Name: Migdalia Luna  Patient MRN: [id number]  Patient YOB: 1940 Thursday, February 23, 2023  Yao Palmer MD  Dear patient,  As a result of recent federal legislation (The Federal Cures Act), you may   receive lab or pathology results from your procedure in your [facility]   account before your physician is able to contact you. Your physician or   their representative will relay the results to you with their   recommendations at their soonest availability.  Thank you,  RESTRICTIONS:  During your procedure today, you received medications for sedation.  These   medications may affect your judgment, balance and coordination.  Therefore,   for 24 hours, you have the following restrictions:   - DO NOT drive a car, operate machinery, make legal/financial decisions,   sign important papers or drink alcohol.    ACTIVITY:  Today: no heavy lifting, straining or running due to procedural   sedation/anesthesia.  The following day: return to full activity including work.  DIET:  Eat and drink normally unless instructed otherwise.     TREATMENT FOR COMMON SIDE EFFECTS:  - Mild abdominal pain, nausea, belching, bloating or excessive gas:  rest,   eat lightly and use a heating pad.  - Sore Throat: treat with throat lozenges and/or gargle with warm salt   water.  - Because air was used during the procedure, expelling large amounts of air   from your rectum or belching is normal.  - If a bowel prep was taken, you may not have a bowel movement for 1-3 days.    This is normal.  SYMPTOMS TO WATCH FOR AND REPORT TO YOUR PHYSICIAN:  1. Abdominal pain or bloating, other than gas cramps.  2. Chest pain.  3. Back pain.  4. Signs of infection such as: chills or fever occurring within 24 hours   after the procedure.  5. Rectal bleeding, which would show as bright red, maroon, or black stools.   (A tablespoon of blood from the rectum is not serious,  especially if   hemorrhoids are present.)  6. Vomiting.  7. Weakness or dizziness.  GO DIRECTLY TO THE NEAREST EMERGENCY ROOM IF YOU HAVE ANY OF THE FOLLOWING:      Difficulty breathing              Chills and/or fever over 101 F   Persistent vomiting and/or vomiting blood   Severe abdominal pain   Severe chest pain   Black, tarry stools   Bleeding- more than one tablespoon   Any other symptom or condition that you feel may need urgent attention  Your doctor recommends these additional instructions:  If any biopsies were taken, your doctors clinic will contact you in 1 to 2   weeks with any results.  - Discharge patient to home.   - Resume previous diet.   - Continue present medications.   - Await pathology results.   - Return to referring physician.  For questions, problems or results please call your physician Yao Palmer MD at Work:  [phone]  If you have any questions about the above instructions, call the GI   department at [phone] or call the endoscopy unit at [phone]   from 7am until 3 pm.  [facility] [place], EMERGENCY ROOM PHONE NUMBER:   [phone]  IF A COMPLICATION OR EMERGENCY SITUATION ARISES AND YOU ARE UNABLE TO REACH   YOUR PHYSICIAN - GO DIRECTLY TO THE EMERGENCY ROOM.  I have read or have had read to me these discharge instructions for my   procedure and have received a written copy.  I understand these   instructions and will follow-up with my physician if I have any questions.     __________________________________       _____________________________________  Nurse Signature                                          Patient/Designated   Responsible Party Signature  Yao Palmer MD  2/23/2023 10:49:34 AM  This report has been verified and signed electronically.  Dear patient,  As a result of recent federal legislation (The Federal Cures Act), you may   receive lab or pathology results from your procedure in your [facility]   account before  your physician is able to contact you. Your physician or   their representative will relay the results to you with their   recommendations at their soonest availability.  Thank you,  PROVATION

## 2023-02-27 ENCOUNTER — OFFICE VISIT (OUTPATIENT)
Dept: INTERNAL MEDICINE | Facility: CLINIC | Age: 83
End: 2023-02-27
Payer: MEDICARE

## 2023-02-27 ENCOUNTER — LAB VISIT (OUTPATIENT)
Dept: LAB | Facility: HOSPITAL | Age: 83
End: 2023-02-27
Attending: INTERNAL MEDICINE
Payer: MEDICARE

## 2023-02-27 VITALS
BODY MASS INDEX: 23.66 KG/M2 | TEMPERATURE: 97 F | OXYGEN SATURATION: 99 % | HEART RATE: 63 BPM | DIASTOLIC BLOOD PRESSURE: 84 MMHG | SYSTOLIC BLOOD PRESSURE: 132 MMHG | HEIGHT: 65 IN | WEIGHT: 142 LBS

## 2023-02-27 DIAGNOSIS — I10 ESSENTIAL HYPERTENSION: ICD-10-CM

## 2023-02-27 DIAGNOSIS — R53.1 WEAKNESS: ICD-10-CM

## 2023-02-27 DIAGNOSIS — R53.1 WEAKNESS: Primary | ICD-10-CM

## 2023-02-27 LAB — ERYTHROCYTE [SEDIMENTATION RATE] IN BLOOD BY WESTERGREN METHOD: 9 MM/HR (ref 0–20)

## 2023-02-27 PROCEDURE — 3288F PR FALLS RISK ASSESSMENT DOCUMENTED: ICD-10-PCS | Mod: HCNC,CPTII,S$GLB, | Performed by: INTERNAL MEDICINE

## 2023-02-27 PROCEDURE — 82085 ASSAY OF ALDOLASE: CPT | Mod: HCNC | Performed by: INTERNAL MEDICINE

## 2023-02-27 PROCEDURE — 3075F PR MOST RECENT SYSTOLIC BLOOD PRESS GE 130-139MM HG: ICD-10-PCS | Mod: HCNC,CPTII,S$GLB, | Performed by: INTERNAL MEDICINE

## 2023-02-27 PROCEDURE — 1160F RVW MEDS BY RX/DR IN RCRD: CPT | Mod: HCNC,CPTII,S$GLB, | Performed by: INTERNAL MEDICINE

## 2023-02-27 PROCEDURE — 1126F PR PAIN SEVERITY QUANTIFIED, NO PAIN PRESENT: ICD-10-PCS | Mod: HCNC,CPTII,S$GLB, | Performed by: INTERNAL MEDICINE

## 2023-02-27 PROCEDURE — 99214 OFFICE O/P EST MOD 30 MIN: CPT | Mod: HCNC,S$GLB,, | Performed by: INTERNAL MEDICINE

## 2023-02-27 PROCEDURE — 99999 PR PBB SHADOW E&M-EST. PATIENT-LVL IV: CPT | Mod: PBBFAC,HCNC,, | Performed by: INTERNAL MEDICINE

## 2023-02-27 PROCEDURE — 1101F PT FALLS ASSESS-DOCD LE1/YR: CPT | Mod: HCNC,CPTII,S$GLB, | Performed by: INTERNAL MEDICINE

## 2023-02-27 PROCEDURE — 1159F PR MEDICATION LIST DOCUMENTED IN MEDICAL RECORD: ICD-10-PCS | Mod: HCNC,CPTII,S$GLB, | Performed by: INTERNAL MEDICINE

## 2023-02-27 PROCEDURE — 1101F PR PT FALLS ASSESS DOC 0-1 FALLS W/OUT INJ PAST YR: ICD-10-PCS | Mod: HCNC,CPTII,S$GLB, | Performed by: INTERNAL MEDICINE

## 2023-02-27 PROCEDURE — 36415 COLL VENOUS BLD VENIPUNCTURE: CPT | Mod: HCNC,PO | Performed by: INTERNAL MEDICINE

## 2023-02-27 PROCEDURE — 1126F AMNT PAIN NOTED NONE PRSNT: CPT | Mod: HCNC,CPTII,S$GLB, | Performed by: INTERNAL MEDICINE

## 2023-02-27 PROCEDURE — 1159F MED LIST DOCD IN RCRD: CPT | Mod: HCNC,CPTII,S$GLB, | Performed by: INTERNAL MEDICINE

## 2023-02-27 PROCEDURE — 99999 PR PBB SHADOW E&M-EST. PATIENT-LVL IV: ICD-10-PCS | Mod: PBBFAC,HCNC,, | Performed by: INTERNAL MEDICINE

## 2023-02-27 PROCEDURE — 84443 ASSAY THYROID STIM HORMONE: CPT | Mod: HCNC | Performed by: INTERNAL MEDICINE

## 2023-02-27 PROCEDURE — 3288F FALL RISK ASSESSMENT DOCD: CPT | Mod: HCNC,CPTII,S$GLB, | Performed by: INTERNAL MEDICINE

## 2023-02-27 PROCEDURE — 3079F DIAST BP 80-89 MM HG: CPT | Mod: HCNC,CPTII,S$GLB, | Performed by: INTERNAL MEDICINE

## 2023-02-27 PROCEDURE — 1160F PR REVIEW ALL MEDS BY PRESCRIBER/CLIN PHARMACIST DOCUMENTED: ICD-10-PCS | Mod: HCNC,CPTII,S$GLB, | Performed by: INTERNAL MEDICINE

## 2023-02-27 PROCEDURE — 85651 RBC SED RATE NONAUTOMATED: CPT | Mod: HCNC | Performed by: INTERNAL MEDICINE

## 2023-02-27 PROCEDURE — 3075F SYST BP GE 130 - 139MM HG: CPT | Mod: HCNC,CPTII,S$GLB, | Performed by: INTERNAL MEDICINE

## 2023-02-27 PROCEDURE — 82550 ASSAY OF CK (CPK): CPT | Mod: HCNC | Performed by: INTERNAL MEDICINE

## 2023-02-27 PROCEDURE — 99214 PR OFFICE/OUTPT VISIT, EST, LEVL IV, 30-39 MIN: ICD-10-PCS | Mod: HCNC,S$GLB,, | Performed by: INTERNAL MEDICINE

## 2023-02-27 PROCEDURE — 3079F PR MOST RECENT DIASTOLIC BLOOD PRESSURE 80-89 MM HG: ICD-10-PCS | Mod: HCNC,CPTII,S$GLB, | Performed by: INTERNAL MEDICINE

## 2023-02-27 NOTE — PROGRESS NOTES
Subjective:       Patient ID: Migdalia Luna is a 83 y.o. female.    Chief Complaint: No chief complaint on file.    HPI Patient is an 83-year-old female presenting today following up on her shortness of breath and weakness issues.  As detailed previously patient has developed exercise intolerance and increasing weakness and shortness of breath with activity which basically started over the summer.  She was previously playing tennis 3 or 4 times a week with no problems and now she is unable to do so.  Initially pulmonary workup was performed which showed a normal workup.  Normal PFTs and normal 6 minute walk.  D-dimer was normal as well.  Oxygen saturations have remained stable.      Cardiac workup was then looked into.  She had a chemical stress test which was negative.  We went ahead and did a exercise stress test as we were concerned about the possibility of exercise-induced arrhythmias but the exercise-induced stress test did not show any significant abnormalities either.  Cardiac workup has also as detailed with Pulmonary been normal.      The patient continues to exhibit a significant loss of exercise capacity.  She describes weakness she describes shortness of breath.  At this point we have found no cardiac or pulmonary issues so were going to have to start looking for other causes this.  She does not distinctively describe proximal muscle weakness.  Her weakness is more just generalized.  There is certainly the possibility of polymyalgia rheumatica or polymyositis.  There is no rash or reported.    Review of Systems   Constitutional:  Positive for fatigue. Negative for chills and fever.   Respiratory:  Positive for shortness of breath. Negative for cough and wheezing.    Cardiovascular:  Negative for chest pain and palpitations.   Gastrointestinal:  Negative for blood in stool, constipation, nausea and vomiting.   Genitourinary:  Negative for dysuria and hematuria.   Skin:  Negative for rash.  "  Neurological:  Positive for weakness.     Objective:   /84   Pulse 63   Temp 97.3 °F (36.3 °C)   Ht 5' 5.3" (1.659 m)   Wt 64.4 kg (141 lb 15.6 oz)   SpO2 99%   BMI 23.41 kg/m²      Physical Exam  Vitals reviewed.   Constitutional:       General: She is not in acute distress.     Appearance: Normal appearance. She is well-developed. She is not ill-appearing.   HENT:      Head: Normocephalic and atraumatic.      Right Ear: External ear normal.      Left Ear: External ear normal.   Cardiovascular:      Rate and Rhythm: Normal rate and regular rhythm.      Heart sounds: No murmur heard.    No friction rub. No gallop.   Pulmonary:      Effort: Pulmonary effort is normal.      Breath sounds: Normal breath sounds. No wheezing or rales.   Chest:      Chest wall: No tenderness.   Lymphadenopathy:      Cervical: No cervical adenopathy.   Skin:     Findings: No rash.   Neurological:      General: No focal deficit present.      Mental Status: She is alert and oriented to person, place, and time.      Cranial Nerves: No cranial nerve deficit.       No visits with results within 2 Week(s) from this visit.   Latest known visit with results is:   Hospital Outpatient Visit on 02/06/2023   Component Date Value    85% Max Predicted HR 02/06/2023 115     Max Predicted HR 02/06/2023 135     [facility] CV CPX PATIENT IS MA* 02/06/2023 0.0     [facility] CV CPX PATIENT IS FE* 02/06/2023 1.0     Systolic blood pressure 02/06/2023 137     Diastolic blood pressure 02/06/2023 85     HR at rest 02/06/2023 82     Exercise duration (min) 02/06/2023 8     Exercise duration (sec) 02/06/2023 29     Peak Systolic BP 02/06/2023 182     Peak Diatolic BP 02/06/2023 89     Peak HR 02/06/2023 118     Estimated METs 02/06/2023 5     % Max HR Achieved 02/06/2023 88     1 Minute Recovery HR 02/06/2023 112     RPP 02/06/2023 11,234     Peak RPP 02/06/2023 21,476        Assessment:       1. Weakness    2. Essential hypertension          Plan:   No " problem-specific Assessment & Plan notes found for this encounter.    Weakness  -     CK; Future; Expected date: 02/27/2023  -     Sedimentation rate; Future; Expected date: 02/27/2023  -     ALDOLASE; Future; Expected date: 02/27/2023  -     TSH; Future; Expected date: 02/27/2023    Essential hypertension          Follow up in about 4 weeks (around 3/27/2023).

## 2023-02-28 LAB
ALDOLASE SERPL-CCNC: 2.5 U/L (ref 1.2–7.6)
CK SERPL-CCNC: 55 U/L (ref 20–180)
FINAL PATHOLOGIC DIAGNOSIS: NORMAL
Lab: NORMAL
TSH SERPL DL<=0.005 MIU/L-ACNC: 2.25 UIU/ML (ref 0.4–4)

## 2023-03-01 ENCOUNTER — TELEPHONE (OUTPATIENT)
Dept: INTERNAL MEDICINE | Facility: CLINIC | Age: 83
End: 2023-03-01
Payer: MEDICARE

## 2023-03-01 DIAGNOSIS — M62.81 MUSCLE WEAKNESS: ICD-10-CM

## 2023-03-01 DIAGNOSIS — R53.1 WEAKNESS: Primary | ICD-10-CM

## 2023-03-04 ENCOUNTER — PATIENT MESSAGE (OUTPATIENT)
Dept: INTERNAL MEDICINE | Facility: CLINIC | Age: 83
End: 2023-03-04
Payer: MEDICARE

## 2023-03-06 ENCOUNTER — PATIENT MESSAGE (OUTPATIENT)
Dept: INTERNAL MEDICINE | Facility: CLINIC | Age: 83
End: 2023-03-06
Payer: MEDICARE

## 2023-03-07 ENCOUNTER — PATIENT MESSAGE (OUTPATIENT)
Dept: GASTROENTEROLOGY | Facility: CLINIC | Age: 83
End: 2023-03-07
Payer: MEDICARE

## 2023-03-28 ENCOUNTER — OFFICE VISIT (OUTPATIENT)
Dept: RHEUMATOLOGY | Facility: CLINIC | Age: 83
End: 2023-03-28
Payer: MEDICARE

## 2023-03-28 ENCOUNTER — LAB VISIT (OUTPATIENT)
Dept: LAB | Facility: HOSPITAL | Age: 83
End: 2023-03-28
Attending: INTERNAL MEDICINE
Payer: MEDICARE

## 2023-03-28 VITALS
BODY MASS INDEX: 23.66 KG/M2 | WEIGHT: 142 LBS | SYSTOLIC BLOOD PRESSURE: 112 MMHG | HEART RATE: 77 BPM | DIASTOLIC BLOOD PRESSURE: 69 MMHG | HEIGHT: 65 IN

## 2023-03-28 DIAGNOSIS — M81.0 OSTEOPOROSIS, UNSPECIFIED OSTEOPOROSIS TYPE, UNSPECIFIED PATHOLOGICAL FRACTURE PRESENCE: ICD-10-CM

## 2023-03-28 DIAGNOSIS — F32.A DEPRESSION, UNSPECIFIED DEPRESSION TYPE: ICD-10-CM

## 2023-03-28 DIAGNOSIS — R53.1 WEAKNESS: ICD-10-CM

## 2023-03-28 DIAGNOSIS — M35.00 SICCA, UNSPECIFIED TYPE: ICD-10-CM

## 2023-03-28 DIAGNOSIS — K21.9 GASTROESOPHAGEAL REFLUX DISEASE WITHOUT ESOPHAGITIS: ICD-10-CM

## 2023-03-28 DIAGNOSIS — M54.2 NECK PAIN: ICD-10-CM

## 2023-03-28 DIAGNOSIS — M67.912 DISORDER OF LEFT ROTATOR CUFF: ICD-10-CM

## 2023-03-28 DIAGNOSIS — M67.912 BILATERAL SHOULDER TENDINOPATHY: ICD-10-CM

## 2023-03-28 DIAGNOSIS — G43.109 MIGRAINE WITH AURA AND WITHOUT STATUS MIGRAINOSUS, NOT INTRACTABLE: ICD-10-CM

## 2023-03-28 DIAGNOSIS — G89.29 CHRONIC THORACIC BACK PAIN, UNSPECIFIED BACK PAIN LATERALITY: ICD-10-CM

## 2023-03-28 DIAGNOSIS — M79.7 FIBROMYALGIA: ICD-10-CM

## 2023-03-28 DIAGNOSIS — M54.6 CHRONIC THORACIC BACK PAIN, UNSPECIFIED BACK PAIN LATERALITY: ICD-10-CM

## 2023-03-28 DIAGNOSIS — M79.18 MYOFASCIAL PAIN: ICD-10-CM

## 2023-03-28 DIAGNOSIS — M62.81 MUSCLE WEAKNESS: ICD-10-CM

## 2023-03-28 DIAGNOSIS — M15.9 PRIMARY OSTEOARTHRITIS INVOLVING MULTIPLE JOINTS: ICD-10-CM

## 2023-03-28 DIAGNOSIS — R13.10 DYSPHAGIA, UNSPECIFIED TYPE: ICD-10-CM

## 2023-03-28 DIAGNOSIS — R53.1 WEAKNESS: Primary | ICD-10-CM

## 2023-03-28 DIAGNOSIS — M67.911 BILATERAL SHOULDER TENDINOPATHY: ICD-10-CM

## 2023-03-28 DIAGNOSIS — E78.2 MIXED HYPERLIPIDEMIA: ICD-10-CM

## 2023-03-28 LAB
25(OH)D3+25(OH)D2 SERPL-MCNC: 103 NG/ML (ref 30–96)
CRP SERPL-MCNC: 7 MG/L (ref 0–8.2)
ERYTHROCYTE [SEDIMENTATION RATE] IN BLOOD BY WESTERGREN METHOD: 23 MM/HR (ref 0–20)

## 2023-03-28 PROCEDURE — 3078F PR MOST RECENT DIASTOLIC BLOOD PRESSURE < 80 MM HG: ICD-10-PCS | Mod: HCNC,CPTII,S$GLB, | Performed by: INTERNAL MEDICINE

## 2023-03-28 PROCEDURE — 1125F AMNT PAIN NOTED PAIN PRSNT: CPT | Mod: HCNC,CPTII,S$GLB, | Performed by: INTERNAL MEDICINE

## 2023-03-28 PROCEDURE — 1101F PR PT FALLS ASSESS DOC 0-1 FALLS W/OUT INJ PAST YR: ICD-10-PCS | Mod: HCNC,CPTII,S$GLB, | Performed by: INTERNAL MEDICINE

## 2023-03-28 PROCEDURE — 83516 IMMUNOASSAY NONANTIBODY: CPT | Mod: HCNC | Performed by: INTERNAL MEDICINE

## 2023-03-28 PROCEDURE — 82085 ASSAY OF ALDOLASE: CPT | Mod: HCNC | Performed by: INTERNAL MEDICINE

## 2023-03-28 PROCEDURE — 99999 PR PBB SHADOW E&M-EST. PATIENT-LVL V: ICD-10-PCS | Mod: PBBFAC,HCNC,, | Performed by: INTERNAL MEDICINE

## 2023-03-28 PROCEDURE — 99214 PR OFFICE/OUTPT VISIT, EST, LEVL IV, 30-39 MIN: ICD-10-PCS | Mod: HCNC,S$GLB,, | Performed by: INTERNAL MEDICINE

## 2023-03-28 PROCEDURE — 1159F PR MEDICATION LIST DOCUMENTED IN MEDICAL RECORD: ICD-10-PCS | Mod: HCNC,CPTII,S$GLB, | Performed by: INTERNAL MEDICINE

## 2023-03-28 PROCEDURE — 86235 NUCLEAR ANTIGEN ANTIBODY: CPT | Mod: 59,HCNC | Performed by: INTERNAL MEDICINE

## 2023-03-28 PROCEDURE — 99499 UNLISTED E&M SERVICE: CPT | Mod: HCNC,S$GLB,, | Performed by: INTERNAL MEDICINE

## 2023-03-28 PROCEDURE — 3078F DIAST BP <80 MM HG: CPT | Mod: HCNC,CPTII,S$GLB, | Performed by: INTERNAL MEDICINE

## 2023-03-28 PROCEDURE — 99214 OFFICE O/P EST MOD 30 MIN: CPT | Mod: HCNC,S$GLB,, | Performed by: INTERNAL MEDICINE

## 2023-03-28 PROCEDURE — 3288F PR FALLS RISK ASSESSMENT DOCUMENTED: ICD-10-PCS | Mod: HCNC,CPTII,S$GLB, | Performed by: INTERNAL MEDICINE

## 2023-03-28 PROCEDURE — 86431 RHEUMATOID FACTOR QUANT: CPT | Mod: HCNC | Performed by: INTERNAL MEDICINE

## 2023-03-28 PROCEDURE — 83516 IMMUNOASSAY NONANTIBODY: CPT | Mod: 59,HCNC | Performed by: INTERNAL MEDICINE

## 2023-03-28 PROCEDURE — 86038 ANTINUCLEAR ANTIBODIES: CPT | Mod: HCNC | Performed by: INTERNAL MEDICINE

## 2023-03-28 PROCEDURE — 1159F MED LIST DOCD IN RCRD: CPT | Mod: HCNC,CPTII,S$GLB, | Performed by: INTERNAL MEDICINE

## 2023-03-28 PROCEDURE — 3074F PR MOST RECENT SYSTOLIC BLOOD PRESSURE < 130 MM HG: ICD-10-PCS | Mod: HCNC,CPTII,S$GLB, | Performed by: INTERNAL MEDICINE

## 2023-03-28 PROCEDURE — 86039 ANTINUCLEAR ANTIBODIES (ANA): CPT | Mod: HCNC | Performed by: INTERNAL MEDICINE

## 2023-03-28 PROCEDURE — 86200 CCP ANTIBODY: CPT | Mod: HCNC | Performed by: INTERNAL MEDICINE

## 2023-03-28 PROCEDURE — 1125F PR PAIN SEVERITY QUANTIFIED, PAIN PRESENT: ICD-10-PCS | Mod: HCNC,CPTII,S$GLB, | Performed by: INTERNAL MEDICINE

## 2023-03-28 PROCEDURE — 99999 PR PBB SHADOW E&M-EST. PATIENT-LVL V: CPT | Mod: PBBFAC,HCNC,, | Performed by: INTERNAL MEDICINE

## 2023-03-28 PROCEDURE — 3288F FALL RISK ASSESSMENT DOCD: CPT | Mod: HCNC,CPTII,S$GLB, | Performed by: INTERNAL MEDICINE

## 2023-03-28 PROCEDURE — 82397 CHEMILUMINESCENT ASSAY: CPT | Mod: HCNC | Performed by: INTERNAL MEDICINE

## 2023-03-28 PROCEDURE — 85651 RBC SED RATE NONAUTOMATED: CPT | Mod: HCNC | Performed by: INTERNAL MEDICINE

## 2023-03-28 PROCEDURE — 3074F SYST BP LT 130 MM HG: CPT | Mod: HCNC,CPTII,S$GLB, | Performed by: INTERNAL MEDICINE

## 2023-03-28 PROCEDURE — 1101F PT FALLS ASSESS-DOCD LE1/YR: CPT | Mod: HCNC,CPTII,S$GLB, | Performed by: INTERNAL MEDICINE

## 2023-03-28 PROCEDURE — 82306 VITAMIN D 25 HYDROXY: CPT | Mod: HCNC | Performed by: INTERNAL MEDICINE

## 2023-03-28 PROCEDURE — 86140 C-REACTIVE PROTEIN: CPT | Mod: HCNC | Performed by: INTERNAL MEDICINE

## 2023-03-28 RX ORDER — CEVIMELINE HYDROCHLORIDE 30 MG/1
30 CAPSULE ORAL 3 TIMES DAILY
Qty: 270 CAPSULE | Refills: 0 | Status: SHIPPED | OUTPATIENT
Start: 2023-03-28 | End: 2023-03-29

## 2023-03-28 RX ORDER — MELOXICAM 7.5 MG/1
1 TABLET ORAL 2 TIMES DAILY
COMMUNITY
Start: 2023-03-02 | End: 2023-12-28

## 2023-03-28 RX ORDER — METHOCARBAMOL 750 MG/1
TABLET, FILM COATED ORAL DAILY
COMMUNITY
Start: 2023-03-02 | End: 2023-09-07

## 2023-03-28 NOTE — PROGRESS NOTES
RHEUMATOLOGY OUTPATIENT CLINIC NOTE    3/28/2023    Attending Rheumatologist: Emmanuel Seymour  Primary Care Provider/Physician Requesting Consultation: John Allen MD   Chief Complaint/Reason For Consultation:  Weakness and Myositis      Subjective:     Migdalia Luna is a 83 y.o. White female with weakness for evaluation.    Symptom onset July of last year.  Subjective whole body intermittently recurring.    Addendum 8/30: msg received regarding patient not been amenable to have NCS/EMG performed to determine presence of myopathy.  MRI of weak muscle group would be helpful to determine presence of myopathy for which would consider biopsy to determine etiology.    Addendum 9/7: msg received regarding pt c/o chronic widespread and upper back pain.  Recommend pharmacotherapy w/ robaxin and Lyrica, with PT for OA and FMS sx.  Recommend evaluation in clinic for evaluation of PMR or myopathy if refractory sx as DDx.    Review of Systems   Constitutional:  Positive for malaise/fatigue. Negative for fever.   HENT:          Moderate sicca   Eyes:  Negative for photophobia and pain.        No hx of glaucoma   Respiratory:  Negative for shortness of breath (LEAL, chronic).         No hx of asthma   Gastrointestinal:  Negative for blood in stool and melena.   Genitourinary:  Negative for hematuria.   Musculoskeletal:  Positive for joint pain. Negative for back pain.   Skin:  Negative for rash.        Denies RP   Neurological:  Positive for weakness. Negative for focal weakness and headaches.     Chronic comorbid conditions affecting medical decision making today:  Past Medical History:   Diagnosis Date    Anemia     Arthritis     left shoulder    Asthma     Chronic bronchitis     Depression     Essential hypertension     GERD (gastroesophageal reflux disease)     History of colon polyps     Due for colonoscopy 2021    Hyperlipidemia     Insomnia     Migraine     Osteoporosis     started on fosamax 10/22    Thyroid  disease      Past Surgical History:   Procedure Laterality Date    BACK SURGERY      epidural    BREAST BIOPSY Right over 10 yrs ago    benign    COLONOSCOPY N/A 07/11/2016    Procedure: COLONOSCOPY;  Surgeon: Yordy Penn MD;  Location: [facility] ENDO;  Service: Endoscopy;  Laterality: N/A;    ESOPHAGOGASTRODUODENOSCOPY N/A 02/23/2023    Procedure: EGD (ESOPHAGOGASTRODUODENOSCOPY);  Surgeon: Yao Palmer MD;  Location: [facility] ENDO;  Service: Endoscopy;  Laterality: N/A;    EYE SURGERY      JOINT REPLACEMENT      KNEE ARTHROPLASTY      KNEE ARTHROSCOPY      bilaterly    TONSILLECTOMY      TUBAL LIGATION       Family History   Problem Relation Age of Onset    Diabetes Mother     Asthma Mother     Asthma Sister     Diabetes Sister     Asthma Brother     Diabetes Brother     Cancer Paternal Aunt         2 aunts    Hypertension Paternal Aunt     Breast cancer Paternal Aunt     Cancer Paternal Uncle     Hypertension Paternal Uncle     Heart failure Paternal Grandmother     Hypertension Paternal Grandmother     Hypertension Paternal Grandfather     Breast cancer Paternal Cousin     Breast cancer Paternal Cousin     Breast cancer Paternal Aunt     Ovarian cancer Paternal Aunt     Arthritis Maternal Grandmother     Stroke Maternal Grandmother     Diabetes Sister      Social History     Tobacco Use   Smoking Status Never   Smokeless Tobacco Never   Tobacco Comments    Have always drank thru a straw; therefore, have the lines of a smoker, but never a smoker       Current Outpatient Medications:     alendronate (FOSAMAX) 70 MG tablet, Take 1 tablet (70 mg total) by mouth every 7 days., Disp: 13 tablet, Rfl: 3    BUPivacaine (MARCAINE) 0.5 % (5 mg/mL) injection, , Disp: , Rfl:     famotidine (PEPCID) 20 MG tablet, Take 2 tablets (40 mg total) by mouth once daily., Disp: 180 tablet, Rfl: 0    HYDROcodone-acetaminophen (NORCO) 7.5-325 mg per tablet, , Disp: , Rfl:     meloxicam (MOBIC) 7.5 MG tablet, Take 1 tablet by  mouth 2 (two) times a day., Disp: , Rfl:     methocarbamoL (ROBAXIN) 750 MG Tab, Take by mouth Daily., Disp: , Rfl:     multivitamin capsule, Take 1 capsule by mouth once daily., Disp: , Rfl:     olmesartan-amLODIPin-hcthiazid 40-10-12.5 mg Tab, , Disp: , Rfl:     olmesartan-hydrochlorothiazide (BENICAR HCT) 40-12.5 mg Tab, , Disp: , Rfl:     senna (SENOKOT) 8.6 mg tablet, Take 1 tablet by mouth once daily., Disp: , Rfl:     sumatriptan (IMITREX) 100 MG tablet, TAKE 1 TABLET WITH ONSET OF HEADACHE, MAY REPEAT ONE TIME TWO HOURS LATER IF NEEDED, Disp: 27 tablet, Rfl: 3    traZODone (DESYREL) 50 MG tablet, , Disp: , Rfl:     zolpidem (AMBIEN) 10 mg Tab, Take 1 tablet (10 mg total) by mouth nightly as needed., Disp: 90 tablet, Rfl: 1     Objective:     Vitals:    03/28/23 1028   BP: 112/69   Pulse: 77     Physical Exam   Eyes: Conjunctivae are normal.   Pulmonary/Chest: Effort normal. No respiratory distress.   Musculoskeletal:         General: Deformity present. No swelling or tenderness. Normal range of motion.   Neurological: She displays no weakness.   Skin: No erythema.     Reviewed available old and all outside pertinent medical records available.    All lab results personally reviewed and interpreted by me.       ASSESSMENT      Encounter Diagnoses   Name Primary?    Weakness Yes    Muscle weakness     Migraine with aura and without status migrainosus, not intractable     Depression, unspecified depression type     Mixed hyperlipidemia     Gastroesophageal reflux disease without esophagitis     Dysphagia, unspecified type     Osteoporosis, unspecified osteoporosis type, unspecified pathological fracture presence     Sicca, unspecified type     Disorder of left rotator cuff     Neck pain     Primary osteoarthritis involving multiple joints       PLAN     Chronic subjective weakness and arthralgias w/ mechanical pattern.  Rotator cuff maneuvers elicit discomfort left side w/ subsequent guarding from pain.  No  reproducible muscle weakness, active synovitis, or rashes w/ CTD kvng on exam.  Labs w/o concern of hemolysis or nephritis/nephropathy.  CK and APR WNR.  DJD changes on imaging. Osteoporotic T scores.  On fosamax per PMD. Myopathy w/u for prognosis.  Screen for vit D insufficiency.  Evoxac trial for sicca.  Side effects discussed.  Referral to PT provided.    Emmanuel Seymour M.D.

## 2023-03-29 ENCOUNTER — PATIENT MESSAGE (OUTPATIENT)
Dept: RHEUMATOLOGY | Facility: CLINIC | Age: 83
End: 2023-03-29
Payer: MEDICARE

## 2023-03-29 DIAGNOSIS — M35.00 SICCA, UNSPECIFIED TYPE: Primary | ICD-10-CM

## 2023-03-29 LAB
ANA PATTERN 1: NORMAL
ANA SER QL IF: POSITIVE
ANA TITR SER IF: NORMAL {TITER}
CCP AB SER IA-ACNC: <0.5 U/ML
RHEUMATOID FACT SERPL-ACNC: <13 IU/ML (ref 0–15)

## 2023-03-29 RX ORDER — TRAZODONE HYDROCHLORIDE 50 MG/1
TABLET ORAL
Qty: 90 TABLET | Refills: 3 | Status: SHIPPED | OUTPATIENT
Start: 2023-03-29 | End: 2024-02-08

## 2023-03-29 RX ORDER — PILOCARPINE HYDROCHLORIDE 7.5 MG/1
7.5 TABLET, FILM COATED ORAL 3 TIMES DAILY
Qty: 90 TABLET | Refills: 11 | Status: SHIPPED | OUTPATIENT
Start: 2023-03-29 | End: 2023-05-15

## 2023-03-29 NOTE — TELEPHONE ENCOUNTER
No new care gaps identified.  [facility] Embedded Care Gaps. Reference number: [id number]. 3/29/2023   2:05:03 AM MAGDIELT

## 2023-03-29 NOTE — TELEPHONE ENCOUNTER
"Dr. Seymour, patient received a phone call from [facility] this morning stating that her copay for cevimeline (EVOXAC) is going to be $297. She can not afford this. Is there an alternative medication that is cheaper?    Coleen Webster (Allye), [facility]  Rheumatology Department    "

## 2023-03-29 NOTE — TELEPHONE ENCOUNTER
Refill Decision Note   Migdalia Luna  is requesting a refill authorization.  Brief Assessment and Rationale for Refill:  Approve     Medication Therapy Plan:       Medication Reconciliation Completed: No   Comments:     No Care Gaps recommended.     Note composed:5:02 PM 03/29/2023

## 2023-03-30 LAB
ALDOLASE SERPL-CCNC: 2.4 U/L (ref 1.2–7.6)
ANTI SM ANTIBODY: 0.08 RATIO (ref 0–0.99)
ANTI SM/RNP ANTIBODY: 0.06 RATIO (ref 0–0.99)
ANTI-SM INTERPRETATION: NEGATIVE
ANTI-SM/RNP INTERPRETATION: NEGATIVE
ANTI-SSA ANTIBODY: 0.15 RATIO (ref 0–0.99)
ANTI-SSA INTERPRETATION: NEGATIVE
ANTI-SSB ANTIBODY: 0.06 RATIO (ref 0–0.99)
ANTI-SSB INTERPRETATION: NEGATIVE
DSDNA AB SER-ACNC: NORMAL [IU]/ML
HMGCR IGG SER IA-ACNC: <20 CU

## 2023-04-04 ENCOUNTER — OFFICE VISIT (OUTPATIENT)
Dept: GASTROENTEROLOGY | Facility: CLINIC | Age: 83
End: 2023-04-04
Payer: MEDICARE

## 2023-04-04 VITALS
BODY MASS INDEX: 23.73 KG/M2 | HEIGHT: 65 IN | HEART RATE: 76 BPM | WEIGHT: 142.44 LBS | DIASTOLIC BLOOD PRESSURE: 76 MMHG | SYSTOLIC BLOOD PRESSURE: 120 MMHG

## 2023-04-04 DIAGNOSIS — R13.10 DYSPHAGIA, UNSPECIFIED TYPE: Primary | ICD-10-CM

## 2023-04-04 PROCEDURE — 1101F PR PT FALLS ASSESS DOC 0-1 FALLS W/OUT INJ PAST YR: ICD-10-PCS | Mod: HCNC,CPTII,S$GLB, | Performed by: INTERNAL MEDICINE

## 2023-04-04 PROCEDURE — 99213 PR OFFICE/OUTPT VISIT, EST, LEVL III, 20-29 MIN: ICD-10-PCS | Mod: HCNC,S$GLB,, | Performed by: INTERNAL MEDICINE

## 2023-04-04 PROCEDURE — 3078F PR MOST RECENT DIASTOLIC BLOOD PRESSURE < 80 MM HG: ICD-10-PCS | Mod: HCNC,CPTII,S$GLB, | Performed by: INTERNAL MEDICINE

## 2023-04-04 PROCEDURE — 3074F SYST BP LT 130 MM HG: CPT | Mod: HCNC,CPTII,S$GLB, | Performed by: INTERNAL MEDICINE

## 2023-04-04 PROCEDURE — 3074F PR MOST RECENT SYSTOLIC BLOOD PRESSURE < 130 MM HG: ICD-10-PCS | Mod: HCNC,CPTII,S$GLB, | Performed by: INTERNAL MEDICINE

## 2023-04-04 PROCEDURE — 1159F PR MEDICATION LIST DOCUMENTED IN MEDICAL RECORD: ICD-10-PCS | Mod: HCNC,CPTII,S$GLB, | Performed by: INTERNAL MEDICINE

## 2023-04-04 PROCEDURE — 3078F DIAST BP <80 MM HG: CPT | Mod: HCNC,CPTII,S$GLB, | Performed by: INTERNAL MEDICINE

## 2023-04-04 PROCEDURE — 1126F AMNT PAIN NOTED NONE PRSNT: CPT | Mod: HCNC,CPTII,S$GLB, | Performed by: INTERNAL MEDICINE

## 2023-04-04 PROCEDURE — 1126F PR PAIN SEVERITY QUANTIFIED, NO PAIN PRESENT: ICD-10-PCS | Mod: HCNC,CPTII,S$GLB, | Performed by: INTERNAL MEDICINE

## 2023-04-04 PROCEDURE — 99999 PR PBB SHADOW E&M-EST. PATIENT-LVL III: CPT | Mod: PBBFAC,HCNC,, | Performed by: INTERNAL MEDICINE

## 2023-04-04 PROCEDURE — 3288F FALL RISK ASSESSMENT DOCD: CPT | Mod: HCNC,CPTII,S$GLB, | Performed by: INTERNAL MEDICINE

## 2023-04-04 PROCEDURE — 99213 OFFICE O/P EST LOW 20 MIN: CPT | Mod: HCNC,S$GLB,, | Performed by: INTERNAL MEDICINE

## 2023-04-04 PROCEDURE — 1101F PT FALLS ASSESS-DOCD LE1/YR: CPT | Mod: HCNC,CPTII,S$GLB, | Performed by: INTERNAL MEDICINE

## 2023-04-04 PROCEDURE — 1159F MED LIST DOCD IN RCRD: CPT | Mod: HCNC,CPTII,S$GLB, | Performed by: INTERNAL MEDICINE

## 2023-04-04 PROCEDURE — 99999 PR PBB SHADOW E&M-EST. PATIENT-LVL III: ICD-10-PCS | Mod: PBBFAC,HCNC,, | Performed by: INTERNAL MEDICINE

## 2023-04-04 PROCEDURE — 3288F PR FALLS RISK ASSESSMENT DOCUMENTED: ICD-10-PCS | Mod: HCNC,CPTII,S$GLB, | Performed by: INTERNAL MEDICINE

## 2023-04-04 NOTE — PROGRESS NOTES
Clinic Progress Note:  [facility] Progress Note    Reason for Visit:  There were no encounter diagnoses.    PCP: John Allen       HPI:  This is a 83 y.o. female here for follow-up for dysphagia.   She is s/p EGD with biopsies in 2/23.  It revealed a paraesophageal hernia.   Biopsies  were negative for EoE.   Patient states that she is not really experiencing dysphagia anymore.   She is followed with PCP and rheumatology to understand why she cannot play tennis any longer and she wants to   Continue to pursue that evaluation at this time.       ROS:  As above HPI       Allergies: Reviewed    Home Medications:   Medication List with Changes/Refills   Current Medications    ALENDRONATE (FOSAMAX) 70 MG TABLET    Take 1 tablet (70 mg total) by mouth every 7 days.    BUPIVACAINE (MARCAINE) 0.5 % (5 MG/ML) INJECTION        FAMOTIDINE (PEPCID) 20 MG TABLET    Take 2 tablets (40 mg total) by mouth once daily.    HYDROCODONE-ACETAMINOPHEN (NORCO) 7.5-325 MG PER TABLET        MELOXICAM (MOBIC) 7.5 MG TABLET    Take 1 tablet by mouth 2 (two) times a day.    METHOCARBAMOL (ROBAXIN) 750 MG TAB    Take by mouth Daily.    MULTIVITAMIN CAPSULE    Take 1 capsule by mouth once daily.    OLMESARTAN-AMLODIPIN-HCTHIAZID 40-10-12.5 MG TAB        OLMESARTAN-HYDROCHLOROTHIAZIDE (BENICAR HCT) 40-12.5 MG TAB        PILOCARPINE HCL (SALAGEN) 7.5 MG TABLET    Take 1 tablet (7.5 mg total) by mouth 3 (three) times daily.    SENNA (SENOKOT) 8.6 MG TABLET    Take 1 tablet by mouth once daily.    SUMATRIPTAN (IMITREX) 100 MG TABLET    TAKE 1 TABLET WITH ONSET OF HEADACHE, MAY REPEAT ONE TIME TWO HOURS LATER IF NEEDED    TRAZODONE (DESYREL) 50 MG TABLET    TAKE 1 TABLET (50 MG TOTAL) BY MOUTH EVERY EVENING.    ZOLPIDEM (AMBIEN) 10 MG TAB    Take 1 tablet (10 mg total) by mouth nightly as needed.         Physical Exam:  Vital Signs:  /76 (BP Location: Right arm, Patient Position: Sitting, BP Method: Medium (Manual))   Pulse  "76   Ht 5' 5.3" (1.659 m)   Wt 64.6 kg (142 lb 6.7 oz)   BMI 23.48 kg/m²   Body mass index is 23.48 kg/m².    Physical Exam  Constitutional:       Appearance: Normal appearance.   HENT:      Head: Normocephalic.   Eyes:      Conjunctiva/sclera: Conjunctivae normal.   Pulmonary:      Effort: Pulmonary effort is normal.   Neurological:      Mental Status: She is alert.        Labs: Pertinent labs reviewed.      Assessment:    Problem List Items Addressed This Visit                  GI     Dysphagia - Primary     Current Assessment & Plan       Plan:   -s/p EGD with biopsies negative for EoE.   -Patient states symptoms have resolved and she does not want to pursue further evaluation at this time               Follow up if symptoms worsen or fail to improve.    Order summary:  No orders of the defined types were placed in this encounter.      Thank you so much for allowing me to participate in the care of Migdalia Palmer MD  Gastroenterology and Hepatology  [facility]     "

## 2023-04-12 ENCOUNTER — PATIENT MESSAGE (OUTPATIENT)
Dept: INTERNAL MEDICINE | Facility: CLINIC | Age: 83
End: 2023-04-12
Payer: MEDICARE

## 2023-04-17 LAB
ANTI-IBM ANTIBODY (ANTI-CN1A): <20 UNITS
ANTI-PM/SCL AB: <20 UNITS
ANTI-SS-A 52 KD AB, IGG: <20 UNITS
EJ AB SER QL: NEGATIVE
ENA JO1 AB SER IA-ACNC: <20 UNITS
ENA SM+RNP AB SER IA-ACNC: <20 UNITS
FIBRILLARIN (U3 RNP): NEGATIVE
KU AB SER QL: NEGATIVE
MDA-5 (P140): <20 UNITS
MI2 AB SER QL: NEGATIVE
NXP-2 (P140): <20 UNITS
OJ AB SER QL: NEGATIVE
PL12 AB SER QL: NEGATIVE
PL7 AB SER QL: NEGATIVE
SRP AB SERPL QL: NEGATIVE
TIF1 GAMMA (P155/140): <20 UNITS
U2 SNRNP: NEGATIVE

## 2023-04-26 ENCOUNTER — PATIENT MESSAGE (OUTPATIENT)
Dept: INTERNAL MEDICINE | Facility: CLINIC | Age: 83
End: 2023-04-26
Payer: MEDICARE

## 2023-05-15 ENCOUNTER — OFFICE VISIT (OUTPATIENT)
Dept: INTERNAL MEDICINE | Facility: CLINIC | Age: 83
End: 2023-05-15
Payer: MEDICARE

## 2023-05-15 VITALS
SYSTOLIC BLOOD PRESSURE: 112 MMHG | WEIGHT: 139.75 LBS | TEMPERATURE: 98 F | DIASTOLIC BLOOD PRESSURE: 80 MMHG | HEART RATE: 82 BPM | OXYGEN SATURATION: 99 % | BODY MASS INDEX: 23.05 KG/M2

## 2023-05-15 DIAGNOSIS — R53.1 WEAKNESS: Primary | ICD-10-CM

## 2023-05-15 PROCEDURE — 1125F AMNT PAIN NOTED PAIN PRSNT: CPT | Mod: CPTII,S$GLB,, | Performed by: INTERNAL MEDICINE

## 2023-05-15 PROCEDURE — 99999 PR PBB SHADOW E&M-EST. PATIENT-LVL III: CPT | Mod: PBBFAC,,, | Performed by: INTERNAL MEDICINE

## 2023-05-15 PROCEDURE — 3079F DIAST BP 80-89 MM HG: CPT | Mod: CPTII,S$GLB,, | Performed by: INTERNAL MEDICINE

## 2023-05-15 PROCEDURE — 1160F PR REVIEW ALL MEDS BY PRESCRIBER/CLIN PHARMACIST DOCUMENTED: ICD-10-PCS | Mod: CPTII,S$GLB,, | Performed by: INTERNAL MEDICINE

## 2023-05-15 PROCEDURE — 99999 PR PBB SHADOW E&M-EST. PATIENT-LVL III: ICD-10-PCS | Mod: PBBFAC,,, | Performed by: INTERNAL MEDICINE

## 2023-05-15 PROCEDURE — 3288F PR FALLS RISK ASSESSMENT DOCUMENTED: ICD-10-PCS | Mod: CPTII,S$GLB,, | Performed by: INTERNAL MEDICINE

## 2023-05-15 PROCEDURE — 1125F PR PAIN SEVERITY QUANTIFIED, PAIN PRESENT: ICD-10-PCS | Mod: CPTII,S$GLB,, | Performed by: INTERNAL MEDICINE

## 2023-05-15 PROCEDURE — 1101F PT FALLS ASSESS-DOCD LE1/YR: CPT | Mod: CPTII,S$GLB,, | Performed by: INTERNAL MEDICINE

## 2023-05-15 PROCEDURE — 1159F PR MEDICATION LIST DOCUMENTED IN MEDICAL RECORD: ICD-10-PCS | Mod: CPTII,S$GLB,, | Performed by: INTERNAL MEDICINE

## 2023-05-15 PROCEDURE — 3074F PR MOST RECENT SYSTOLIC BLOOD PRESSURE < 130 MM HG: ICD-10-PCS | Mod: CPTII,S$GLB,, | Performed by: INTERNAL MEDICINE

## 2023-05-15 PROCEDURE — 99213 OFFICE O/P EST LOW 20 MIN: CPT | Mod: S$GLB,,, | Performed by: INTERNAL MEDICINE

## 2023-05-15 PROCEDURE — 1160F RVW MEDS BY RX/DR IN RCRD: CPT | Mod: CPTII,S$GLB,, | Performed by: INTERNAL MEDICINE

## 2023-05-15 PROCEDURE — 1159F MED LIST DOCD IN RCRD: CPT | Mod: CPTII,S$GLB,, | Performed by: INTERNAL MEDICINE

## 2023-05-15 PROCEDURE — 1101F PR PT FALLS ASSESS DOC 0-1 FALLS W/OUT INJ PAST YR: ICD-10-PCS | Mod: CPTII,S$GLB,, | Performed by: INTERNAL MEDICINE

## 2023-05-15 PROCEDURE — 3079F PR MOST RECENT DIASTOLIC BLOOD PRESSURE 80-89 MM HG: ICD-10-PCS | Mod: CPTII,S$GLB,, | Performed by: INTERNAL MEDICINE

## 2023-05-15 PROCEDURE — 3288F FALL RISK ASSESSMENT DOCD: CPT | Mod: CPTII,S$GLB,, | Performed by: INTERNAL MEDICINE

## 2023-05-15 PROCEDURE — 3074F SYST BP LT 130 MM HG: CPT | Mod: CPTII,S$GLB,, | Performed by: INTERNAL MEDICINE

## 2023-05-15 PROCEDURE — 99213 PR OFFICE/OUTPT VISIT, EST, LEVL III, 20-29 MIN: ICD-10-PCS | Mod: S$GLB,,, | Performed by: INTERNAL MEDICINE

## 2023-05-15 NOTE — PROGRESS NOTES
Subjective:       Patient ID: Migdalia Luna is a 83 y.o. female.    Chief Complaint: Follow-up      HPI:  Patient is 83-year-old female presenting today following up on her generalized weakness issues.  At this point we have had a negative workup essentially across the board.  Pulmonary Cardiology Rheumatology have not been able to find a cause for these symptoms.  She indicates she has somewhat accepted them at this point.  She is participating in her social engagements with her friends but it is just no longer able to play tennis with them.  She does relate that she has been aware of severe disc disease in her back and problems with her shoulders for many years and that her orthopedist have been following and she is suspicious that this is what has really played a part in limiting her activity at this time.  She does not wish to pursue any more workup or aggressiveness about this at this time.  She is adapting to the new norm.    Review of Systems   Constitutional:  Positive for fatigue. Negative for chills and fever.   Respiratory:  Negative for cough, shortness of breath and wheezing.    Cardiovascular:  Negative for chest pain and palpitations.   Gastrointestinal:  Negative for blood in stool, constipation, nausea and vomiting.   Genitourinary:  Negative for dysuria and hematuria.   Musculoskeletal:  Positive for arthralgias and back pain.   Skin:  Negative for rash.     Objective:   /80   Pulse 82   Temp 97.9 °F (36.6 °C)   Wt 63.4 kg (139 lb 12.4 oz)   SpO2 99%   BMI 23.05 kg/m²      Physical Exam  Vitals reviewed.   Constitutional:       General: She is not in acute distress.     Appearance: Normal appearance. She is well-developed. She is not ill-appearing.   HENT:      Head: Normocephalic and atraumatic.      Right Ear: External ear normal.      Left Ear: External ear normal.   Cardiovascular:      Rate and Rhythm: Normal rate and regular rhythm.      Heart sounds: No murmur heard.    No  friction rub. No gallop.   Pulmonary:      Effort: Pulmonary effort is normal.      Breath sounds: Normal breath sounds. No wheezing or rales.   Chest:      Chest wall: No tenderness.   Abdominal:      General: Bowel sounds are normal. There is no distension.      Palpations: Abdomen is soft.      Tenderness: There is no abdominal tenderness.   Lymphadenopathy:      Cervical: No cervical adenopathy.   Skin:     Findings: No rash.   Neurological:      General: No focal deficit present.      Mental Status: She is alert and oriented to person, place, and time.      Cranial Nerves: No cranial nerve deficit.       No visits with results within 2 Week(s) from this visit.   Latest known visit with results is:   Lab Visit on 03/28/2023   Component Date Value    CCP Antibodies 03/28/2023 <0.5     Rheumatoid Factor 03/28/2023 <13.0     Sed Rate 03/28/2023 23 (H)     CRP 03/28/2023 7.0     CECILIO Screen 03/28/2023 Positive (A)     Anti-Lluvia-1 Antibody 03/28/2023 <20     PL-7 03/28/2023 Negative     PL-12 03/28/2023 Negative     EJ 03/28/2023 Negative     OJ 03/28/2023 Negative     SRP 03/28/2023 Negative     MI-2 03/28/2023 Negative     TIF1 GAMMA (P155/140) 03/28/2023 <20     MDA-5 (P140) (CADM-140) 03/28/2023 <20     NXP-2 (P140) 03/28/2023 <20     Anti-PM/Scl Ab 03/28/2023 <20     Fibrillarin (U3 RNP) 03/28/2023 Negative     U2 snRNP 03/28/2023 Negative     Anti-U1-RNP  Ab 03/28/2023 <20     Ku 03/28/2023 Negative     Anti-SS-A 52 kD Ab, IgG 03/28/2023 <20     Aldolase 03/28/2023 2.4     Anti-CN1A Antibody 03/28/2023 <20     HMGCR Antibody 03/28/2023 <20.0     Vit D, 25-Hydroxy 03/28/2023 103 (H)     CECILIO PATTERN 1 03/28/2023 Homogeneous     Anti Sm Antibody 03/28/2023 0.08     Anti-Sm Interpretation 03/28/2023 Negative     Anti-SSA Antibody 03/28/2023 0.15     Anti-SSA Interpretation 03/28/2023 Negative     Anti-SSB Antibody 03/28/2023 0.06     Anti-SSB Interpretation 03/28/2023 Negative     ds DNA Ab 03/28/2023 Negative 1:10      Anti Sm/RNP Antibody 03/28/2023 0.06     Anti-Sm/RNP Interpretati* 03/28/2023 Negative     CECILIO Titer 1 03/28/2023 1:160        Assessment:       1. Weakness        Plan:   No problem-specific Assessment & Plan notes found for this encounter.    Weakness  Comments:  Likely related to widespread arthritic and degenerative changes.    We will continue to monitor for any changes.  I did send a message to Dr. Lion turnering if a trial of systemic steroids might be worthwhile.  I discussed this whether the that that is obviously not a perfect choice but it would be interesting to see what sort of improvement she might have under general symptoms.  Not sure if it is something that would be reasonable for long-term situation unless she really got significant improvement given the potential downsides of the steroids.  Await see his opinion and then we can discuss it with her again and see what she wants to do.    Addendum     Steroids not recommended at this time

## 2023-05-23 DIAGNOSIS — M62.81 MUSCLE WEAKNESS: Primary | ICD-10-CM

## 2023-06-12 ENCOUNTER — PES CALL (OUTPATIENT)
Dept: ADMINISTRATIVE | Facility: CLINIC | Age: 83
End: 2023-06-12
Payer: MEDICARE

## 2023-07-06 DIAGNOSIS — G43.909 MIGRAINE WITHOUT STATUS MIGRAINOSUS, NOT INTRACTABLE, UNSPECIFIED MIGRAINE TYPE: ICD-10-CM

## 2023-07-06 RX ORDER — SUMATRIPTAN SUCCINATE 100 MG/1
TABLET ORAL
Qty: 27 TABLET | Refills: 3 | Status: SHIPPED | OUTPATIENT
Start: 2023-07-06

## 2023-07-06 NOTE — TELEPHONE ENCOUNTER
Refill Routing Note   Refill Routing Note   Medication(s) are not appropriate for processing by [facility] for the following reason(s):      No active prescription written by PCP    [facility] action(s):  Defer None identified        Medication reconciliation completed: No     Appointments  past 12m or future 3m with PCP    Date Provider   Last Visit   5/15/2023 John Allen MD   Next Visit   Visit date not found John Allen MD   ED visits in past 90 days: 0        Note composed:10:45 AM 07/06/2023

## 2023-07-06 NOTE — TELEPHONE ENCOUNTER
No care due was identified.  [facility] Embedded Care Due Messages. Reference number: [id number].   7/06/2023 2:23:31 AM CDT

## 2023-07-11 RX ORDER — ZOLPIDEM TARTRATE 10 MG/1
TABLET ORAL
Qty: 90 TABLET | Refills: 0 | Status: SHIPPED | OUTPATIENT
Start: 2023-07-11 | End: 2023-12-29

## 2023-07-11 NOTE — TELEPHONE ENCOUNTER
No care due was identified.  [facility] Embedded Care Due Messages. Reference number: [id number].   7/10/2023 10:48:05 PM CDT

## 2023-07-27 RX ORDER — ALENDRONATE SODIUM 70 MG/1
TABLET ORAL
Qty: 12 TABLET | Refills: 3 | Status: SHIPPED | OUTPATIENT
Start: 2023-07-27

## 2023-08-15 NOTE — TELEPHONE ENCOUNTER
Call pt and tell her scripts sent. Also reschedule her lab and EPP apt.    Previous Labs: Yes How Did The Hair Loss Occur?: gradual in onset How Severe Is Your Hair Loss?: severe When Were The Labs Drawn? (Drawn...): 2023 Lab Details: thyroid function studies - normal Additional History: She is accompanied by her daughter, who assists with translation.

## 2023-08-21 ENCOUNTER — TELEPHONE (OUTPATIENT)
Dept: NEUROLOGY | Facility: CLINIC | Age: 83
End: 2023-08-21
Payer: MEDICARE

## 2023-08-21 NOTE — TELEPHONE ENCOUNTER
Called patient to schedule EMG and she said she was in a crowd and couldn't hear.  Requested that I call back and leave my phone # on her voicemail.  I did as she requested.

## 2023-08-22 ENCOUNTER — TELEPHONE (OUTPATIENT)
Dept: RHEUMATOLOGY | Facility: CLINIC | Age: 83
End: 2023-08-22
Payer: MEDICARE

## 2023-08-22 NOTE — TELEPHONE ENCOUNTER
----- Message from Valerie Mabry sent at 8/22/2023 10:44 AM CDT -----  Regarding: EMG/NCS  Good morning,    I just wanted to let you know after I scheduled Ms. Luna for her EMG/NCS, she called back to cancel. She said she spoke with someone that told her it is extremely painful and did not want to proceed.  I tried to explain the test to her and assure her that she could stop the test at any point if she felt she couldn't tolerate it, but she told me to cancel the order.     Thanks,    Valerie

## 2023-08-23 ENCOUNTER — PATIENT MESSAGE (OUTPATIENT)
Dept: ADMINISTRATIVE | Facility: HOSPITAL | Age: 83
End: 2023-08-23
Payer: MEDICARE

## 2023-08-29 ENCOUNTER — PATIENT OUTREACH (OUTPATIENT)
Dept: ADMINISTRATIVE | Facility: HOSPITAL | Age: 83
End: 2023-08-29
Payer: MEDICARE

## 2023-08-29 DIAGNOSIS — Z12.31 ENCOUNTER FOR SCREENING MAMMOGRAM FOR BREAST CANCER: Primary | ICD-10-CM

## 2023-09-06 NOTE — TELEPHONE ENCOUNTER
"RE: EMG/NCS  Received: 1 week ago  Emmanuel Seymour MD Simoneaux, Alexandra Allye, CMA  Appreciate the update.  MRI evaluation of weakest muscle group would be helpful to determine presence of myopathy as differential diagnosis of muscle weakness.  Which area of her body is most troublesome for the patient in terms of weakness to place order for MRI (arms or legs, which side)?           Previous Messages       ----- Message -----   From: Coleen Webster CMA   Sent: 8/22/2023  10:50 AM CDT   To: Emmanuel Seymour MD   Subject: FW: EMG/NCS                                       SHARIFA Berrios" TALHA Webster   Rheumatology Department   "

## 2023-09-06 NOTE — TELEPHONE ENCOUNTER
"Dr. Seymour, I contacted Mrs. Negron to discuss this with her. She stated that the more troublesome areas of her body for pain and weakness are both of her shoulders and her whole back. Please advise.    Coleen Webster (Allye), [facility]  Rheumatology Department   "

## 2023-09-07 RX ORDER — PREGABALIN 25 MG/1
25 CAPSULE ORAL 2 TIMES DAILY
Qty: 60 CAPSULE | Refills: 6 | Status: SHIPPED | OUTPATIENT
Start: 2023-09-07 | End: 2024-02-08

## 2023-09-07 RX ORDER — METHOCARBAMOL 500 MG/1
500 TABLET, FILM COATED ORAL NIGHTLY PRN
Qty: 90 TABLET | Refills: 0 | Status: SHIPPED | OUTPATIENT
Start: 2023-09-07 | End: 2023-12-28

## 2023-09-07 NOTE — TELEPHONE ENCOUNTER
Emmanuel Seymour MD  You 27 minutes ago (8:23 AM)       Suggest holding Fosamax (prescribed by PCP) for 1 month and monitor for improvement.   Trial of Robaxin with Lyrica for at least 3 months, with physical therapy.   Suggest evaluation in clinic if no improvement.

## 2023-09-26 ENCOUNTER — LAB VISIT (OUTPATIENT)
Dept: LAB | Facility: HOSPITAL | Age: 83
End: 2023-09-26
Attending: INTERNAL MEDICINE
Payer: MEDICARE

## 2023-09-26 DIAGNOSIS — R53.1 WEAKNESS: ICD-10-CM

## 2023-09-26 LAB
ALBUMIN SERPL BCP-MCNC: 3.7 G/DL (ref 3.5–5.2)
ALP SERPL-CCNC: 60 U/L (ref 55–135)
ALT SERPL W/O P-5'-P-CCNC: 12 U/L (ref 10–44)
ANION GAP SERPL CALC-SCNC: 12 MMOL/L (ref 8–16)
AST SERPL-CCNC: 21 U/L (ref 10–40)
BILIRUB SERPL-MCNC: 0.4 MG/DL (ref 0.1–1)
BUN SERPL-MCNC: 17 MG/DL (ref 8–23)
CALCIUM SERPL-MCNC: 9.8 MG/DL (ref 8.7–10.5)
CHLORIDE SERPL-SCNC: 104 MMOL/L (ref 95–110)
CK SERPL-CCNC: 51 U/L (ref 20–180)
CO2 SERPL-SCNC: 21 MMOL/L (ref 23–29)
CREAT SERPL-MCNC: 1.4 MG/DL (ref 0.5–1.4)
EST. GFR  (NO RACE VARIABLE): 38 ML/MIN/1.73 M^2
GLUCOSE SERPL-MCNC: 136 MG/DL (ref 70–110)
POTASSIUM SERPL-SCNC: 4 MMOL/L (ref 3.5–5.1)
PROT SERPL-MCNC: 6.8 G/DL (ref 6–8.4)
SODIUM SERPL-SCNC: 137 MMOL/L (ref 136–145)

## 2023-09-26 PROCEDURE — 82085 ASSAY OF ALDOLASE: CPT | Mod: HCNC | Performed by: INTERNAL MEDICINE

## 2023-09-26 PROCEDURE — 82550 ASSAY OF CK (CPK): CPT | Mod: HCNC | Performed by: INTERNAL MEDICINE

## 2023-09-26 PROCEDURE — 36415 COLL VENOUS BLD VENIPUNCTURE: CPT | Mod: HCNC,PO | Performed by: INTERNAL MEDICINE

## 2023-09-26 PROCEDURE — 80053 COMPREHEN METABOLIC PANEL: CPT | Mod: HCNC | Performed by: INTERNAL MEDICINE

## 2023-09-27 LAB — ALDOLASE SERPL-CCNC: 1.8 U/L (ref 1.2–7.6)

## 2023-10-02 ENCOUNTER — TELEPHONE (OUTPATIENT)
Dept: INTERNAL MEDICINE | Facility: CLINIC | Age: 83
End: 2023-10-02
Payer: MEDICARE

## 2023-10-02 DIAGNOSIS — M51.35 DDD (DEGENERATIVE DISC DISEASE), THORACOLUMBAR: Primary | ICD-10-CM

## 2023-10-02 NOTE — TELEPHONE ENCOUNTER
----- Message from Jumana Wilson sent at 10/2/2023  2:06 PM CDT -----  Regarding: referral  Name of who is calling:   Migdalia      What is the request in detail: Pt is requesting a call back in ref to getting a referral to Dr Steve Roman for Pain management      Can the clinic reply by [facility]:yes      What number to call back if not [facility]:[phone]

## 2023-10-02 NOTE — TELEPHONE ENCOUNTER
----- Message from Jumana Wilson sent at 10/2/2023  2:06 PM CDT -----  Regarding: referral  Name of who is calling:   Migdalia      What is the request in detail: Pt is requesting a call back in ref to getting a referral to Dr Steve Roman for Pain management      Can the clinic reply by [facility]:yes      What number to call back if not [facility]:[phone]

## 2023-10-03 ENCOUNTER — TELEPHONE (OUTPATIENT)
Dept: PAIN MEDICINE | Facility: CLINIC | Age: 83
End: 2023-10-03
Payer: MEDICARE

## 2023-10-24 ENCOUNTER — HOSPITAL ENCOUNTER (OUTPATIENT)
Dept: RADIOLOGY | Facility: HOSPITAL | Age: 83
Discharge: HOME | End: 2023-10-24
Attending: INTERNAL MEDICINE
Payer: MEDICARE

## 2023-10-24 DIAGNOSIS — Z12.31 ENCOUNTER FOR SCREENING MAMMOGRAM FOR BREAST CANCER: ICD-10-CM

## 2023-10-24 PROCEDURE — 77067 SCR MAMMO BI INCL CAD: CPT | Mod: TC,HCNC,PO

## 2023-10-24 PROCEDURE — 77063 MAMMO DIGITAL SCREENING BILAT WITH TOMO: ICD-10-PCS | Mod: 26,HCNC,, | Performed by: RADIOLOGY

## 2023-10-24 PROCEDURE — 77067 SCR MAMMO BI INCL CAD: CPT | Mod: 26,HCNC,, | Performed by: RADIOLOGY

## 2023-10-24 PROCEDURE — 77067 MAMMO DIGITAL SCREENING BILAT WITH TOMO: ICD-10-PCS | Mod: 26,HCNC,, | Performed by: RADIOLOGY

## 2023-10-24 PROCEDURE — 77063 BREAST TOMOSYNTHESIS BI: CPT | Mod: 26,HCNC,, | Performed by: RADIOLOGY

## 2023-10-25 NOTE — PROGRESS NOTES
Your mammogram is normal.  Repeat screening is recommended in one year.    Sincerely,    John Allen M.D.        If you would like to review your experience with Dr. Allen or [facility], please follow the link below:    [email or web address]

## 2023-11-05 ENCOUNTER — PATIENT MESSAGE (OUTPATIENT)
Dept: ADMINISTRATIVE | Facility: CLINIC | Age: 83
End: 2023-11-05
Payer: MEDICARE

## 2023-11-15 ENCOUNTER — PATIENT MESSAGE (OUTPATIENT)
Dept: INTERNAL MEDICINE | Facility: CLINIC | Age: 83
End: 2023-11-15
Payer: MEDICARE

## 2023-11-15 DIAGNOSIS — E78.2 MIXED HYPERLIPIDEMIA: ICD-10-CM

## 2023-11-15 DIAGNOSIS — I10 ESSENTIAL HYPERTENSION: Primary | ICD-10-CM

## 2023-11-16 NOTE — TELEPHONE ENCOUNTER
Please advise per pt portal message.  Does pt need an office visit?  Annual labs?  Pt seems to mostly come for acute issues.

## 2023-11-28 ENCOUNTER — LAB VISIT (OUTPATIENT)
Dept: LAB | Facility: HOSPITAL | Age: 83
End: 2023-11-28
Attending: INTERNAL MEDICINE
Payer: MEDICARE

## 2023-11-28 DIAGNOSIS — E78.2 MIXED HYPERLIPIDEMIA: ICD-10-CM

## 2023-11-28 DIAGNOSIS — I10 ESSENTIAL HYPERTENSION: ICD-10-CM

## 2023-11-28 LAB
ALBUMIN SERPL BCP-MCNC: 3.6 G/DL (ref 3.5–5.2)
ALP SERPL-CCNC: 58 U/L (ref 55–135)
ALT SERPL W/O P-5'-P-CCNC: 14 U/L (ref 10–44)
ANION GAP SERPL CALC-SCNC: 8 MMOL/L (ref 8–16)
AST SERPL-CCNC: 23 U/L (ref 10–40)
BASOPHILS # BLD AUTO: 0.03 K/UL (ref 0–0.2)
BASOPHILS NFR BLD: 0.5 % (ref 0–1.9)
BILIRUB SERPL-MCNC: 0.4 MG/DL (ref 0.1–1)
BUN SERPL-MCNC: 14 MG/DL (ref 8–23)
CALCIUM SERPL-MCNC: 9.8 MG/DL (ref 8.7–10.5)
CHLORIDE SERPL-SCNC: 109 MMOL/L (ref 95–110)
CHOLEST SERPL-MCNC: 193 MG/DL (ref 120–199)
CHOLEST/HDLC SERPL: 3.3 {RATIO} (ref 2–5)
CO2 SERPL-SCNC: 26 MMOL/L (ref 23–29)
CREAT SERPL-MCNC: 1 MG/DL (ref 0.5–1.4)
DIFFERENTIAL METHOD: ABNORMAL
EOSINOPHIL # BLD AUTO: 0.2 K/UL (ref 0–0.5)
EOSINOPHIL NFR BLD: 3.2 % (ref 0–8)
ERYTHROCYTE [DISTWIDTH] IN BLOOD BY AUTOMATED COUNT: 13.7 % (ref 11.5–14.5)
EST. GFR  (NO RACE VARIABLE): 56.6 ML/MIN/1.73 M^2
GLUCOSE SERPL-MCNC: 85 MG/DL (ref 70–110)
HCT VFR BLD AUTO: 34.1 % (ref 37–48.5)
HDLC SERPL-MCNC: 59 MG/DL (ref 40–75)
HDLC SERPL: 30.6 % (ref 20–50)
HGB BLD-MCNC: 10.8 G/DL (ref 12–16)
IMM GRANULOCYTES # BLD AUTO: 0.02 K/UL (ref 0–0.04)
IMM GRANULOCYTES NFR BLD AUTO: 0.4 % (ref 0–0.5)
LDLC SERPL CALC-MCNC: 116.6 MG/DL (ref 63–159)
LYMPHOCYTES # BLD AUTO: 1.3 K/UL (ref 1–4.8)
LYMPHOCYTES NFR BLD: 23.1 % (ref 18–48)
MCH RBC QN AUTO: 32.9 PG (ref 27–31)
MCHC RBC AUTO-ENTMCNC: 31.7 G/DL (ref 32–36)
MCV RBC AUTO: 104 FL (ref 82–98)
MONOCYTES # BLD AUTO: 0.6 K/UL (ref 0.3–1)
MONOCYTES NFR BLD: 11.5 % (ref 4–15)
NEUTROPHILS # BLD AUTO: 3.4 K/UL (ref 1.8–7.7)
NEUTROPHILS NFR BLD: 61.3 % (ref 38–73)
NONHDLC SERPL-MCNC: 134 MG/DL
NRBC BLD-RTO: 0 /100 WBC
PLATELET # BLD AUTO: 367 K/UL (ref 150–450)
PMV BLD AUTO: 9.4 FL (ref 9.2–12.9)
POTASSIUM SERPL-SCNC: 3.8 MMOL/L (ref 3.5–5.1)
PROT SERPL-MCNC: 6.8 G/DL (ref 6–8.4)
RBC # BLD AUTO: 3.28 M/UL (ref 4–5.4)
SODIUM SERPL-SCNC: 143 MMOL/L (ref 136–145)
TRIGL SERPL-MCNC: 87 MG/DL (ref 30–150)
WBC # BLD AUTO: 5.55 K/UL (ref 3.9–12.7)

## 2023-11-28 PROCEDURE — 80053 COMPREHEN METABOLIC PANEL: CPT | Mod: HCNC | Performed by: INTERNAL MEDICINE

## 2023-11-28 PROCEDURE — 36415 COLL VENOUS BLD VENIPUNCTURE: CPT | Mod: HCNC,PO | Performed by: INTERNAL MEDICINE

## 2023-11-28 PROCEDURE — 80061 LIPID PANEL: CPT | Mod: HCNC | Performed by: INTERNAL MEDICINE

## 2023-11-28 PROCEDURE — 85025 COMPLETE CBC W/AUTO DIFF WBC: CPT | Mod: HCNC | Performed by: INTERNAL MEDICINE

## 2023-11-29 ENCOUNTER — PATIENT MESSAGE (OUTPATIENT)
Dept: INTERNAL MEDICINE | Facility: CLINIC | Age: 83
End: 2023-11-29
Payer: MEDICARE

## 2023-11-29 DIAGNOSIS — D64.9 ANEMIA, UNSPECIFIED TYPE: Primary | ICD-10-CM

## 2023-12-28 DIAGNOSIS — M79.7 FIBROMYALGIA: ICD-10-CM

## 2023-12-28 DIAGNOSIS — G89.29 CHRONIC THORACIC BACK PAIN, UNSPECIFIED BACK PAIN LATERALITY: ICD-10-CM

## 2023-12-28 DIAGNOSIS — M54.6 CHRONIC THORACIC BACK PAIN, UNSPECIFIED BACK PAIN LATERALITY: ICD-10-CM

## 2023-12-28 DIAGNOSIS — M15.9 PRIMARY OSTEOARTHRITIS INVOLVING MULTIPLE JOINTS: ICD-10-CM

## 2023-12-28 RX ORDER — METHOCARBAMOL 500 MG/1
TABLET, FILM COATED ORAL
Qty: 90 TABLET | Refills: 0 | Status: SHIPPED | OUTPATIENT
Start: 2023-12-28

## 2023-12-28 RX ORDER — MELOXICAM 7.5 MG/1
7.5 TABLET ORAL DAILY
Qty: 180 TABLET | Refills: 0 | Status: SHIPPED | OUTPATIENT
Start: 2023-12-28

## 2023-12-28 NOTE — TELEPHONE ENCOUNTER
Care Due:                  Date            Visit Type   Department     Provider  --------------------------------------------------------------------------------                                EP -                              PRIMARY      [facility] INTERNAL  Last Visit: 05-      CARE ([facility])   MEDICINE       John Allen  Next Visit: None Scheduled  None         None Found                                                            Last  Test          Frequency    Reason                     Performed    Due Date  --------------------------------------------------------------------------------    Mg Level....  12 months..  alendronate..............  Not Found    Overdue    Phosphate...  12 months..  alendronate..............  Not Found    Overdue    Vitamin D...  12 months..  alendronate..............  03- 03-    [facility] Embedded Care Due Messages. Reference number: [id number].   12/28/2023 10:27:06 AM CST

## 2023-12-29 RX ORDER — ZOLPIDEM TARTRATE 10 MG/1
TABLET ORAL
Qty: 90 TABLET | Refills: 0 | Status: SHIPPED | OUTPATIENT
Start: 2023-12-29

## 2023-12-29 NOTE — TELEPHONE ENCOUNTER
No care due was identified.  [facility] Embedded Care Due Messages. Reference number: [id number].   12/28/2023 9:05:49 PM CST

## 2024-02-01 ENCOUNTER — HOSPITAL ENCOUNTER (OUTPATIENT)
Dept: RADIOLOGY | Facility: HOSPITAL | Age: 84
Discharge: HOME | End: 2024-02-01
Attending: PHYSICAL MEDICINE & REHABILITATION
Payer: MEDICARE

## 2024-02-01 ENCOUNTER — OFFICE VISIT (OUTPATIENT)
Dept: PAIN MEDICINE | Facility: CLINIC | Age: 84
End: 2024-02-01
Payer: MEDICARE

## 2024-02-01 VITALS
HEIGHT: 65 IN | HEART RATE: 40 BPM | WEIGHT: 138 LBS | BODY MASS INDEX: 22.99 KG/M2 | SYSTOLIC BLOOD PRESSURE: 128 MMHG | DIASTOLIC BLOOD PRESSURE: 72 MMHG

## 2024-02-01 DIAGNOSIS — M41.9 SCOLIOSIS, UNSPECIFIED SCOLIOSIS TYPE, UNSPECIFIED SPINAL REGION: Primary | ICD-10-CM

## 2024-02-01 DIAGNOSIS — M41.9 SCOLIOSIS, UNSPECIFIED SCOLIOSIS TYPE, UNSPECIFIED SPINAL REGION: ICD-10-CM

## 2024-02-01 DIAGNOSIS — M51.35 DDD (DEGENERATIVE DISC DISEASE), THORACOLUMBAR: ICD-10-CM

## 2024-02-01 DIAGNOSIS — M79.18 MYALGIA, OTHER SITE: ICD-10-CM

## 2024-02-01 DIAGNOSIS — M47.816 LUMBAR SPONDYLOSIS: ICD-10-CM

## 2024-02-01 PROCEDURE — 1159F MED LIST DOCD IN RCRD: CPT | Mod: HCNC,CPTII,S$GLB, | Performed by: PHYSICAL MEDICINE & REHABILITATION

## 2024-02-01 PROCEDURE — 3078F DIAST BP <80 MM HG: CPT | Mod: HCNC,CPTII,S$GLB, | Performed by: PHYSICAL MEDICINE & REHABILITATION

## 2024-02-01 PROCEDURE — 1126F AMNT PAIN NOTED NONE PRSNT: CPT | Mod: HCNC,CPTII,S$GLB, | Performed by: PHYSICAL MEDICINE & REHABILITATION

## 2024-02-01 PROCEDURE — 99204 OFFICE O/P NEW MOD 45 MIN: CPT | Mod: 25,HCNC,S$GLB, | Performed by: PHYSICAL MEDICINE & REHABILITATION

## 2024-02-01 PROCEDURE — 3288F FALL RISK ASSESSMENT DOCD: CPT | Mod: HCNC,CPTII,S$GLB, | Performed by: PHYSICAL MEDICINE & REHABILITATION

## 2024-02-01 PROCEDURE — 20553 NJX 1/MLT TRIGGER POINTS 3/>: CPT | Mod: HCNC,S$GLB,, | Performed by: PHYSICAL MEDICINE & REHABILITATION

## 2024-02-01 PROCEDURE — 72080 X-RAY EXAM THORACOLMB 2/> VW: CPT | Mod: 26,HCNC,, | Performed by: RADIOLOGY

## 2024-02-01 PROCEDURE — 99999 PR PBB SHADOW E&M-EST. PATIENT-LVL IV: CPT | Mod: PBBFAC,HCNC,, | Performed by: PHYSICAL MEDICINE & REHABILITATION

## 2024-02-01 PROCEDURE — 3074F SYST BP LT 130 MM HG: CPT | Mod: HCNC,CPTII,S$GLB, | Performed by: PHYSICAL MEDICINE & REHABILITATION

## 2024-02-01 PROCEDURE — 1101F PT FALLS ASSESS-DOCD LE1/YR: CPT | Mod: HCNC,CPTII,S$GLB, | Performed by: PHYSICAL MEDICINE & REHABILITATION

## 2024-02-01 PROCEDURE — 72080 X-RAY EXAM THORACOLMB 2/> VW: CPT | Mod: TC,HCNC

## 2024-02-01 RX ORDER — METHYLPREDNISOLONE ACETATE 40 MG/ML
40 INJECTION, SUSPENSION INTRA-ARTICULAR; INTRALESIONAL; INTRAMUSCULAR; SOFT TISSUE
Status: COMPLETED | OUTPATIENT
Start: 2024-02-01 | End: 2024-02-01

## 2024-02-01 RX ADMIN — METHYLPREDNISOLONE ACETATE 40 MG: 40 INJECTION, SUSPENSION INTRA-ARTICULAR; INTRALESIONAL; INTRAMUSCULAR; SOFT TISSUE at 03:02

## 2024-02-01 NOTE — PROGRESS NOTES
New Patient Chronic Pain Note (Initial Visit)    Referring Physician: John Allen MD    PCP: Mirella, Primary Doctor    Chief Complaint:   Chief Complaint   Patient presents with    Mid-back Pain    Low-back Pain        SUBJECTIVE:    Migdalia Luna is a 84 y.o. female who presents to the clinic for the evaluation of chronic back pain.  She was referred by her primary care team for further evaluation management of this pain.  She has past medical history of migraine, depression, hypertension, hyperlipidemia, osteoporosis, GERD, insomnia, and multiple other medical comorbidities as listed in her chart.  The pain started 15+ years ago  and symptoms have been worsening.The pain is located in the midthoracic area.  The pain is described as  dull, aching  and is rated as 0/10. The pain is rated with a score of  0/10 on the BEST day and a score of 10/10 on the WORST day.  Symptoms interfere with daily activity. The pain is exacerbated by increased movement.  The pain is mitigated by rest.     Patient denies night fever/night sweats, urinary incontinence, bowel incontinence, significant weight loss, significant motor weakness, and loss of sensations.    Pain Disability Index Review:         2/1/2024     3:38 PM   Last 3 PDI Scores   Pain Disability Index (PDI) 0       Non-Pharmacologic Treatments:  Physical Therapy/Home Exercise: no  Ice/Heat:yes  TENS: no  Acupuncture: no  Massage: yes  Chiropractic: no    Other: no      Pain Medications:  - Opioids:  Norco  - Adjuvant Medications:  Ambien, Lyrica, Fosamax, Mobic, Robaxin, Imitrex, trazodone  - Anti-Coagulants:  None     report:  Reviewed and consistent with medication use as prescribed.    Pain Procedures:   -previous interventions, unknown types      Imaging:   CT abdomen pelvis 07/11/2022:  Visualized lung bases are clear.     Abdomen: The liver, spleen, adrenals, kidneys, and pancreas are within normal limits on this noncontrast exam. Cholelithiasis. No ascites  or adenopathy. The abdominal aorta is nonaneurysmal.     Large hiatal hernia. There is inflammatory stranding associated with the right hemicolon. There are a few prominent mesenteric lymph nodes in the right lower quadrant. Normal caliber appendix.     Pelvis: No pelvic free fluid, mass, or adenopathy.     Past Medical History:   Diagnosis Date    Anemia     Arthritis     left shoulder    Asthma     Chronic bronchitis     Depression     Essential hypertension     GERD (gastroesophageal reflux disease)     History of colon polyps     Due for colonoscopy 2021    Hyperlipidemia     Insomnia     Migraine     Osteoporosis     started on fosamax 10/22    Thyroid disease      Past Surgical History:   Procedure Laterality Date    BACK SURGERY      epidural    BREAST BIOPSY Right over 10 yrs ago    benign    COLONOSCOPY N/A 07/11/2016    Procedure: COLONOSCOPY;  Surgeon: Yordy Penn MD;  Location: [facility];  Service: Endoscopy;  Laterality: N/A;    ESOPHAGOGASTRODUODENOSCOPY N/A 02/23/2023    Procedure: EGD (ESOPHAGOGASTRODUODENOSCOPY);  Surgeon: Yao Palmer MD;  Location: [facility] ENDO;  Service: Endoscopy;  Laterality: N/A;    EYE SURGERY      JOINT REPLACEMENT      KNEE ARTHROPLASTY      KNEE ARTHROSCOPY      bilaterly    TONSILLECTOMY      TUBAL LIGATION       Social History     Socioeconomic History    Marital status:    Occupational History    Occupation: retired     Employer: OTHER   Tobacco Use    Smoking status: Never    Smokeless tobacco: Never    Tobacco comments:     Have always drank thru a straw; therefore, have the lines of a smoker, but never a smoker   Substance and Sexual Activity    Alcohol use: No    Drug use: No    Sexual activity: Not Currently     Partners: Male     Birth control/protection: None   Social History Narrative    Patient is retired.     Social Determinants of Health     Financial Resource Strain: Low Risk  (5/13/2023)    Overall Financial Resource Strain (CARDIA)      Difficulty of Paying Living Expenses: Not hard at all   Food Insecurity: No Food Insecurity (5/13/2023)    Hunger Vital Sign     Worried About Running Out of Food in the Last Year: Never true     Ran Out of Food in the Last Year: Never true   Transportation Needs: No Transportation Needs (5/13/2023)    PRAPARE - Transportation     Lack of Transportation (Medical): No     Lack of Transportation (Non-Medical): No   Physical Activity: Inactive (5/13/2023)    Exercise Vital Sign     Days of Exercise per Week: 0 days     Minutes of Exercise per Session: 10 min   Stress: Stress Concern Present (5/13/2023)    Citizen of [place] of Occupational Health - Occupational Stress Questionnaire     Feeling of Stress : To some extent   Social Connections: Unknown (5/13/2023)    Social Connection and Isolation Panel [NHANES]     Frequency of Communication with Friends and Family: More than three times a week     Frequency of Social Gatherings with Friends and Family: More than three times a week     Active Member of Clubs or Organizations: Yes     Attends Club or Organization Meetings: More than 4 times per year     Marital Status:    Housing Stability: Low Risk  (5/13/2023)    Housing Stability Vital Sign     Unable to Pay for Housing in the Last Year: No     Number of Places Lived in the Last Year: 1     Unstable Housing in the Last Year: No   Recent Concern: Housing Stability - High Risk (2/24/2023)    Housing Stability Vital Sign     Unable to Pay for Housing in the Last Year: Yes     Number of Places Lived in the Last Year: 1     Unstable Housing in the Last Year: No     Family History   Problem Relation Age of Onset    Diabetes Mother     Asthma Mother     Asthma Sister     Diabetes Sister     Asthma Brother     Diabetes Brother     Cancer Paternal Aunt         2 aunts    Hypertension Paternal Aunt     Breast cancer Paternal Aunt     Cancer Paternal Uncle     Hypertension Paternal Uncle     Heart failure Paternal  Grandmother     Hypertension Paternal Grandmother     Hypertension Paternal Grandfather     Breast cancer Paternal Cousin     Breast cancer Paternal Cousin     Breast cancer Paternal Aunt     Ovarian cancer Paternal Aunt     Arthritis Maternal Grandmother     Stroke Maternal Grandmother     Diabetes Sister        Review of patient's allergies indicates:  No Known Allergies    Current Outpatient Medications   Medication Sig    alendronate (FOSAMAX) 70 MG tablet TAKE 1 TABLET EVERY 7 DAYS    BUPivacaine (MARCAINE) 0.5 % (5 mg/mL) injection     meloxicam (MOBIC) 7.5 MG tablet Take 1 tablet (7.5 mg total) by mouth once daily.    methocarbamoL (ROBAXIN) 500 MG Tab TAKE 1 TABLET EVERY NIGHT AS NEEDED FOR PAIN/CRAMPS    multivitamin capsule Take 1 capsule by mouth once daily.    olmesartan-amLODIPin-hcthiazid 40-10-12.5 mg Tab     olmesartan-hydrochlorothiazide (BENICAR HCT) 40-12.5 mg Tab     pregabalin (LYRICA) 25 MG capsule Take 1 capsule (25 mg total) by mouth 2 (two) times daily.    sumatriptan (IMITREX) 100 MG tablet TAKE 1 TABLET WITH ONSET OF HEADACHE, MAY REPEAT ONE TIME TWO HOURS LATER IF NEEDED    zolpidem (AMBIEN) 10 mg Tab TAKE 1 TABLET EVERY NIGHT AS NEEDED    HYDROcodone-acetaminophen (NORCO) 7.5-325 mg per tablet     senna (SENOKOT) 8.6 mg tablet Take 1 tablet by mouth once daily.    traZODone (DESYREL) 50 MG tablet TAKE 1 TABLET (50 MG TOTAL) BY MOUTH EVERY EVENING. (Patient not taking: Reported on 2/1/2024)     No current facility-administered medications for this visit.       Review of Systems     GENERAL:  No weight loss, malaise or fevers.  HEENT:   No recent changes in vision or hearing  NECK:  Negative for lumps, no difficulty with swallowing.  RESPIRATORY:  Negative for cough, wheezing or shortness of breath, patient denies any recent URI.  CARDIOVASCULAR:  Negative for chest pain, leg swelling or palpitations.  GI:  Negative for abdominal discomfort, blood in stools or black stools or change in  "bowel habits.  MUSCULOSKELETAL:  See HPI.  SKIN:  Negative for lesions, rash, and itching.  PSYCH:  No mood disorder or recent psychosocial stressors.  Patients sleep is not disturbed secondary to pain.  HEMATOLOGY/LYMPHOLOGY:  Negative for prolonged bleeding, bruising easily or swollen nodes.  Patient is not currently taking any anti-coagulants  NEURO:   No history of headaches, syncope, paralysis, seizures or tremors.  All other reviewed and negative other than HPI.    OBJECTIVE:    /72   Pulse (!) 40   Ht 5' 5" (1.651 m)   Wt 62.6 kg (138 lb 0.1 oz)   BMI 22.97 kg/m²         Physical Exam    GENERAL: Well appearing, in no acute distress, alert and oriented x3.  PSYCH:  Mood and affect appropriate.  SKIN: Skin color, texture, turgor normal, no rashes or lesions.  HEAD/FACE:  Normocephalic, atraumatic. Cranial nerves grossly intact.  CV: RRR with palpation of the radial artery.  PULM: No evidence of respiratory difficulty, symmetric chest rise.  GI:  Soft and non-tender.  BACK:  Positive Guthrie test.  Straight leg raising in the sitting and supine positions is negative to radicular pain.  Moderate pain to palpation over the thoracic paraspinals and lower trapezius.  Limited range of motion secondary to pain reproduction.  Axial loading positive bilaterally in lumbar spine.  EXTREMITIES: No deformities, edema, or skin discoloration. Good capillary refill.  MUSCULOSKELETAL: Shoulder, hip, and knee provocative maneuvers are negative.   Bilateral upper and lower extremity strength is normal and symmetric.  No atrophy or tone abnormalities are noted.  NEURO: Bilateral upper and lower extremity coordination and muscle stretch reflexes are physiologic and symmetric.  Plantar response are downgoing. No clonus.  No loss of sensation is noted.  GAIT: normal.      LABS:  Lab Results   Component Value Date    WBC 5.55 11/28/2023    HGB 10.8 (L) 11/28/2023    HCT 34.1 (L) 11/28/2023     (H) 11/28/2023     " 11/28/2023       CMP  Sodium   Date Value Ref Range Status   11/28/2023 143 136 - 145 mmol/L Final     Potassium   Date Value Ref Range Status   11/28/2023 3.8 3.5 - 5.1 mmol/L Final     Chloride   Date Value Ref Range Status   11/28/2023 109 95 - 110 mmol/L Final     CO2   Date Value Ref Range Status   11/28/2023 26 23 - 29 mmol/L Final     Glucose   Date Value Ref Range Status   11/28/2023 85 70 - 110 mg/dL Final     BUN   Date Value Ref Range Status   11/28/2023 14 8 - 23 mg/dL Final     Creatinine   Date Value Ref Range Status   11/28/2023 1.0 0.5 - 1.4 mg/dL Final     Calcium   Date Value Ref Range Status   11/28/2023 9.8 8.7 - 10.5 mg/dL Final     Total Protein   Date Value Ref Range Status   11/28/2023 6.8 6.0 - 8.4 g/dL Final     Albumin   Date Value Ref Range Status   11/28/2023 3.6 3.5 - 5.2 g/dL Final     Total Bilirubin   Date Value Ref Range Status   11/28/2023 0.4 0.1 - 1.0 mg/dL Final     Comment:     For infants and newborns, interpretation of results should be based  on gestational age, weight and in agreement with clinical  observations.    Premature Infant recommended reference ranges:  Up to 24 hours.............<8.0 mg/dL  Up to 48 hours............<12.0 mg/dL  3-5 days..................<15.0 mg/dL  6-29 days.................<15.0 mg/dL       Alkaline Phosphatase   Date Value Ref Range Status   11/28/2023 58 55 - 135 U/L Final     AST   Date Value Ref Range Status   11/28/2023 23 10 - 40 U/L Final     ALT   Date Value Ref Range Status   11/28/2023 14 10 - 44 U/L Final     Anion Gap   Date Value Ref Range Status   11/28/2023 8 8 - 16 mmol/L Final     eGFR if    Date Value Ref Range Status   07/27/2022 >60.0 >60 mL/min/1.73 m^2 Final     eGFR if non    Date Value Ref Range Status   07/27/2022 53.3 (A) >60 mL/min/1.73 m^2 Final     Comment:     Calculation used to obtain the estimated glomerular filtration  rate (eGFR) is the CKD-EPI equation.          No results  "found for: "LABA1C", "HGBA1C"          ASSESSMENT: 84 y.o. year old female with midback pain, consistent with     1. Scoliosis, unspecified scoliosis type, unspecified spinal region  X-Ray Thoracolumbar Spine AP Lateral      2. DDD (degenerative disc disease), thoracolumbar  Ambulatory referral/consult to Pain Clinic      3. Myalgia, other site        4. Lumbar spondylosis              PLAN:   - Interventions:  Performed trigger point injections to the thoracic paraspinals and lower trapezius on the right. Explained the risks and benefits of the procedure in detail with the patient today in clinic along with alternative treatment options, and the patient elected to pursue the intervention at this time.  Informed consent obtained, see procedure note below for more details.    Consider lumbar MBB for axial lumbar pain in the future if warranted    - Anticoagulation use:  None      - If no benefit with above procedure, consider  thoracic BETITO .     - Medications: I have stressed the importance of physical activity and a home exercise plan to help with pain and improve health. and Patient can continue with medications for now since they are providing benefits, using them appropriately, and without side effects.         - Therapy:  Advised patient continue with activities and exercises as tolerated    - Labs:  Reviewed    - Imaging: Reviewed available imaging with patient and answered any questions they had regarding study.  Obtain AP and lateral thoracolumbar spine    - Consults/Referrals:  None at this time    - Records:  Reviewed/Obtain old records from outside physicians and imaging    - Follow up visit: return to clinic as needed    - Counseled patient regarding the importance of activity modification and physical therapy    - This condition does not require this patient to take time off of work, and the primary goal of our Pain Management services is to improve the patient's functional capacity.    - Patient " Questions: Answered all of the patient's questions regarding diagnosis, therapy, and treatment    PROCEDURE NOTE:  Trigger Point Injection:   The procedure was discussed with the patient including complications of nerve damage,  bleeding, infection, and failure of pain relief.   Trigger points were identified by palpation and marked. Alcohol swab prep of sites done. A mixture of 4mL 1% lidocaine + 40 mg Depo-Medrol was prepared (5 mL total).   A 25-gauge needle was advanced to the point of maximal tenderness, and  1 to 1.25mL  was injected after negative aspiration. All sites done in the same manner. Patient tolerated the procedure well and without complications. Patient was monitored for 15 min following the injection before discharged from the clinic.  Sites injected included:  Thoracic paraspinals and lower trapezius on the right      The above plan and management options were discussed at length with patient. Patient is in agreement with the above and verbalized understanding.    I discussed the goals of interventional chronic pain management with the patient on today's visit.  I explained the utility of injections for diagnostic and therapeutic purposes.  We discussed a multimodal approach to pain including treating the patient's given worst pain at any given time.  We will use a systematic approach to addressing pain.  We will also adopt a multimodal approach that includes injections, adjuvant medications, physical therapy, at times psychiatry.  There may be a limited role for opioid use intermittently in the treatment of pain, more particularly for acute pain although no one approach can be used as a sole treatment modality.    I emphasized the importance of regular exercise, core strengthening and stretching, diet and weight loss as a cornerstone of long-term pain management.      Steve Roman MD  Interventional Pain Management  [facility]    Disclaimer:  This note was prepared using voice  recognition system and is likely to have sound alike errors that may have been overlooked even after proof reading.  Please call me with any questions

## 2024-02-08 ENCOUNTER — LAB VISIT (OUTPATIENT)
Dept: LAB | Facility: HOSPITAL | Age: 84
End: 2024-02-08
Attending: INTERNAL MEDICINE
Payer: MEDICARE

## 2024-02-08 ENCOUNTER — OFFICE VISIT (OUTPATIENT)
Dept: INTERNAL MEDICINE | Facility: CLINIC | Age: 84
End: 2024-02-08
Payer: MEDICARE

## 2024-02-08 VITALS
DIASTOLIC BLOOD PRESSURE: 82 MMHG | BODY MASS INDEX: 22.27 KG/M2 | TEMPERATURE: 99 F | OXYGEN SATURATION: 97 % | SYSTOLIC BLOOD PRESSURE: 120 MMHG | WEIGHT: 133.81 LBS | HEART RATE: 101 BPM

## 2024-02-08 DIAGNOSIS — I10 ESSENTIAL HYPERTENSION: ICD-10-CM

## 2024-02-08 DIAGNOSIS — J06.9 VIRAL URI: ICD-10-CM

## 2024-02-08 DIAGNOSIS — I10 ESSENTIAL HYPERTENSION: Primary | ICD-10-CM

## 2024-02-08 DIAGNOSIS — D64.9 ANEMIA, UNSPECIFIED TYPE: ICD-10-CM

## 2024-02-08 DIAGNOSIS — R53.1 WEAKNESS: ICD-10-CM

## 2024-02-08 DIAGNOSIS — E78.2 MIXED HYPERLIPIDEMIA: ICD-10-CM

## 2024-02-08 LAB
ALBUMIN SERPL BCP-MCNC: 3.5 G/DL (ref 3.5–5.2)
ALP SERPL-CCNC: 106 U/L (ref 55–135)
ALT SERPL W/O P-5'-P-CCNC: 14 U/L (ref 10–44)
ANION GAP SERPL CALC-SCNC: 9 MMOL/L (ref 8–16)
AST SERPL-CCNC: 17 U/L (ref 10–40)
BASOPHILS # BLD AUTO: 0.05 K/UL (ref 0–0.2)
BASOPHILS NFR BLD: 0.3 % (ref 0–1.9)
BILIRUB SERPL-MCNC: 0.6 MG/DL (ref 0.1–1)
BNP SERPL-MCNC: 28 PG/ML (ref 0–99)
BUN SERPL-MCNC: 16 MG/DL (ref 8–23)
CALCIUM SERPL-MCNC: 10 MG/DL (ref 8.7–10.5)
CHLORIDE SERPL-SCNC: 109 MMOL/L (ref 95–110)
CO2 SERPL-SCNC: 23 MMOL/L (ref 23–29)
CREAT SERPL-MCNC: 0.9 MG/DL (ref 0.5–1.4)
DIFFERENTIAL METHOD BLD: ABNORMAL
EOSINOPHIL # BLD AUTO: 0.3 K/UL (ref 0–0.5)
EOSINOPHIL NFR BLD: 1.9 % (ref 0–8)
ERYTHROCYTE [DISTWIDTH] IN BLOOD BY AUTOMATED COUNT: 14 % (ref 11.5–14.5)
EST. GFR  (NO RACE VARIABLE): >60 ML/MIN/1.73 M^2
GLUCOSE SERPL-MCNC: 94 MG/DL (ref 70–110)
HCT VFR BLD AUTO: 35.2 % (ref 37–48.5)
HGB BLD-MCNC: 11.2 G/DL (ref 12–16)
IMM GRANULOCYTES # BLD AUTO: 0.08 K/UL (ref 0–0.04)
IMM GRANULOCYTES NFR BLD AUTO: 0.5 % (ref 0–0.5)
LYMPHOCYTES # BLD AUTO: 1.3 K/UL (ref 1–4.8)
LYMPHOCYTES NFR BLD: 8.3 % (ref 18–48)
MCH RBC QN AUTO: 33.3 PG (ref 27–31)
MCHC RBC AUTO-ENTMCNC: 31.8 G/DL (ref 32–36)
MCV RBC AUTO: 105 FL (ref 82–98)
MONOCYTES # BLD AUTO: 1.4 K/UL (ref 0.3–1)
MONOCYTES NFR BLD: 9 % (ref 4–15)
NEUTROPHILS # BLD AUTO: 12.3 K/UL (ref 1.8–7.7)
NEUTROPHILS NFR BLD: 80 % (ref 38–73)
NRBC BLD-RTO: 0 /100 WBC
PLATELET # BLD AUTO: 425 K/UL (ref 150–450)
PMV BLD AUTO: 10.4 FL (ref 9.2–12.9)
POTASSIUM SERPL-SCNC: 4.2 MMOL/L (ref 3.5–5.1)
PROT SERPL-MCNC: 6.9 G/DL (ref 6–8.4)
RBC # BLD AUTO: 3.36 M/UL (ref 4–5.4)
SODIUM SERPL-SCNC: 141 MMOL/L (ref 136–145)
WBC # BLD AUTO: 15.34 K/UL (ref 3.9–12.7)

## 2024-02-08 PROCEDURE — 1101F PT FALLS ASSESS-DOCD LE1/YR: CPT | Mod: HCNC,CPTII,S$GLB, | Performed by: INTERNAL MEDICINE

## 2024-02-08 PROCEDURE — 99214 OFFICE O/P EST MOD 30 MIN: CPT | Mod: HCNC,S$GLB,, | Performed by: INTERNAL MEDICINE

## 2024-02-08 PROCEDURE — 83880 ASSAY OF NATRIURETIC PEPTIDE: CPT | Mod: HCNC | Performed by: INTERNAL MEDICINE

## 2024-02-08 PROCEDURE — 80053 COMPREHEN METABOLIC PANEL: CPT | Mod: HCNC | Performed by: INTERNAL MEDICINE

## 2024-02-08 PROCEDURE — 36415 COLL VENOUS BLD VENIPUNCTURE: CPT | Mod: HCNC,PO | Performed by: INTERNAL MEDICINE

## 2024-02-08 PROCEDURE — 3074F SYST BP LT 130 MM HG: CPT | Mod: HCNC,CPTII,S$GLB, | Performed by: INTERNAL MEDICINE

## 2024-02-08 PROCEDURE — 3079F DIAST BP 80-89 MM HG: CPT | Mod: HCNC,CPTII,S$GLB, | Performed by: INTERNAL MEDICINE

## 2024-02-08 PROCEDURE — 1159F MED LIST DOCD IN RCRD: CPT | Mod: HCNC,CPTII,S$GLB, | Performed by: INTERNAL MEDICINE

## 2024-02-08 PROCEDURE — 1125F AMNT PAIN NOTED PAIN PRSNT: CPT | Mod: HCNC,CPTII,S$GLB, | Performed by: INTERNAL MEDICINE

## 2024-02-08 PROCEDURE — 86038 ANTINUCLEAR ANTIBODIES: CPT | Mod: HCNC | Performed by: INTERNAL MEDICINE

## 2024-02-08 PROCEDURE — 3288F FALL RISK ASSESSMENT DOCD: CPT | Mod: HCNC,CPTII,S$GLB, | Performed by: INTERNAL MEDICINE

## 2024-02-08 PROCEDURE — 85652 RBC SED RATE AUTOMATED: CPT | Mod: HCNC | Performed by: INTERNAL MEDICINE

## 2024-02-08 PROCEDURE — 99999 PR PBB SHADOW E&M-EST. PATIENT-LVL V: CPT | Mod: PBBFAC,HCNC,, | Performed by: INTERNAL MEDICINE

## 2024-02-08 PROCEDURE — 85025 COMPLETE CBC W/AUTO DIFF WBC: CPT | Mod: HCNC | Performed by: INTERNAL MEDICINE

## 2024-02-08 PROCEDURE — 82607 VITAMIN B-12: CPT | Mod: GA,HCNC | Performed by: INTERNAL MEDICINE

## 2024-02-08 PROCEDURE — 1160F RVW MEDS BY RX/DR IN RCRD: CPT | Mod: HCNC,CPTII,S$GLB, | Performed by: INTERNAL MEDICINE

## 2024-02-08 PROCEDURE — 82746 ASSAY OF FOLIC ACID SERUM: CPT | Mod: GA,HCNC | Performed by: INTERNAL MEDICINE

## 2024-02-08 NOTE — PROGRESS NOTES
Subjective:       Patient ID: Migdalia Luna is a 84 y.o. female.    Chief Complaint: Cough (Headache, running fever yesterday, ear pain, fatigue )      HPI:  Patient is a an 84-year-old female presenting today following up on fatigue and weakness.  Patient has had longstanding issues with these symptoms.  She feels like they have gotten worse since she was last in which was almost a year ago at this point.  Workup by Cardiology, Pulmonary and Rheumatology has yielded no causation for the symptoms.  She continues to describe that she has just extreme weakness of the muscles.  She is not able to participate in daily activities due to this weakness.    She also is complaining of some upper respiratory symptoms today.  She says she has had a little bit of a headache some ear pain and sore throat in the last 24 hours or so.  No nausea or vomiting no diarrhea.  No sick contacts that she is aware of.    Review of Systems   Constitutional:  Negative for chills and fever.   HENT:  Positive for ear pain and sore throat. Negative for postnasal drip and rhinorrhea.    Respiratory:  Positive for cough and shortness of breath. Negative for wheezing.    Cardiovascular:  Negative for chest pain.   Musculoskeletal:  Negative for myalgias.   Skin:  Negative for rash.   Allergic/Immunologic: Negative for environmental allergies.   Neurological:  Positive for headaches.       Objective:   /82   Pulse 101   Temp 98.7 °F (37.1 °C) (Tympanic)   Wt 60.7 kg (133 lb 13.1 oz)   SpO2 97%   BMI 22.27 kg/m²      Physical Exam  Vitals reviewed.   Constitutional:       General: She is not in acute distress.     Appearance: Normal appearance. She is well-developed. She is not ill-appearing.   HENT:      Head: Normocephalic and atraumatic.      Right Ear: Tympanic membrane, ear canal and external ear normal.      Left Ear: Tympanic membrane, ear canal and external ear normal.   Cardiovascular:      Rate and Rhythm: Normal rate and  regular rhythm.      Heart sounds: No murmur heard.     No friction rub. No gallop.   Pulmonary:      Effort: Pulmonary effort is normal.      Breath sounds: Normal breath sounds. No wheezing or rales.   Chest:      Chest wall: No tenderness.   Abdominal:      General: Abdomen is flat. Bowel sounds are normal. There is no distension.      Palpations: Abdomen is soft.      Tenderness: There is no abdominal tenderness.   Lymphadenopathy:      Cervical: No cervical adenopathy.   Skin:     Findings: No rash.   Neurological:      General: No focal deficit present.      Mental Status: She is alert and oriented to person, place, and time.         No visits with results within 2 Week(s) from this visit.   Latest known visit with results is:   Lab Visit on 11/28/2023   Component Date Value    WBC 11/28/2023 5.55     RBC 11/28/2023 3.28 (L)     Hemoglobin 11/28/2023 10.8 (L)     Hematocrit 11/28/2023 34.1 (L)     MCV 11/28/2023 104 (H)     MCH 11/28/2023 32.9 (H)     MCHC 11/28/2023 31.7 (L)     RDW 11/28/2023 13.7     Platelets 11/28/2023 367     MPV 11/28/2023 9.4     Immature Granulocytes 11/28/2023 0.4     Gran # (ANC) 11/28/2023 3.4     Immature Grans (Abs) 11/28/2023 0.02     Lymph # 11/28/2023 1.3     Mono # 11/28/2023 0.6     Eos # 11/28/2023 0.2     Baso # 11/28/2023 0.03     nRBC 11/28/2023 0     Gran % 11/28/2023 61.3     Lymph % 11/28/2023 23.1     Mono % 11/28/2023 11.5     Eosinophil % 11/28/2023 3.2     Basophil % 11/28/2023 0.5     Differential Method 11/28/2023 Automated     Sodium 11/28/2023 143     Potassium 11/28/2023 3.8     Chloride 11/28/2023 109     CO2 11/28/2023 26     Glucose 11/28/2023 85     BUN 11/28/2023 14     Creatinine 11/28/2023 1.0     Calcium 11/28/2023 9.8     Total Protein 11/28/2023 6.8     Albumin 11/28/2023 3.6     Total Bilirubin 11/28/2023 0.4     Alkaline Phosphatase 11/28/2023 58     AST 11/28/2023 23     ALT 11/28/2023 14     eGFR 11/28/2023 56.6 (A)     Anion Gap 11/28/2023 8      Cholesterol 11/28/2023 193     Triglycerides 11/28/2023 87     HDL 11/28/2023 59     LDL Cholesterol 11/28/2023 116.6     HDL/Cholesterol Ratio 11/28/2023 30.6     Total Cholesterol/HDL Ra* 11/28/2023 3.3     Non-HDL Cholesterol 11/28/2023 134        Assessment:       1. Essential hypertension    2. Mixed hyperlipidemia    3. Weakness    4. Anemia, unspecified type    5. Viral URI        Plan:   No problem-specific Assessment & Plan notes found for this encounter.    Essential hypertension  -     CBC Auto Differential; Future; Expected date: 02/08/2024  -     Comprehensive Metabolic Panel; Future; Expected date: 02/08/2024  -     Sedimentation rate; Future; Expected date: 02/08/2024  -     B-TYPE NATRIURETIC PEPTIDE; Future; Expected date: 02/08/2024    Mixed hyperlipidemia    Weakness  -     Sedimentation rate; Future; Expected date: 02/08/2024  -     Ambulatory referral/consult to Neurology; Future; Expected date: 02/15/2024  -     CECILIO; Future; Expected date: 02/08/2024    Anemia, unspecified type  -     Vitamin B12; Future; Expected date: 02/08/2024  -     FOLATE; Future; Expected date: 02/08/2024    Viral URI  Comments:  tylenol or advil for ear pain.  warm salt water gargles.    We will go ahead and update some lab work in regards to her chronic weakness.  She has not seen Neurology at this point and that is going to the last place like it thinks review moving forward.  She does not have any isolating or localizing symptoms of weakness is more of a generalized issue.  Prior workup has been negative except for they a homogeneous positive CECILIO at 1-160 but it was not negative rheumatology workup out beyond that.      Follow up in about 6 weeks (around 3/21/2024).

## 2024-02-09 ENCOUNTER — PATIENT MESSAGE (OUTPATIENT)
Dept: INTERNAL MEDICINE | Facility: CLINIC | Age: 84
End: 2024-02-09
Payer: MEDICARE

## 2024-02-09 DIAGNOSIS — D72.829 LEUKOCYTOSIS, UNSPECIFIED TYPE: Primary | ICD-10-CM

## 2024-02-09 LAB
ANA SER QL IF: NORMAL
ERYTHROCYTE [SEDIMENTATION RATE] IN BLOOD BY PHOTOMETRIC METHOD: 72 MM/HR (ref 0–36)
FOLATE SERPL-MCNC: 11.4 NG/ML (ref 4–24)
VIT B12 SERPL-MCNC: >2000 PG/ML (ref 210–950)

## 2024-02-09 RX ORDER — AMOXICILLIN AND CLAVULANATE POTASSIUM 875; 125 MG/1; MG/1
1 TABLET, FILM COATED ORAL 2 TIMES DAILY
Qty: 20 TABLET | Refills: 0 | Status: SHIPPED | OUTPATIENT
Start: 2024-02-09 | End: 2024-02-19

## 2024-02-09 NOTE — TELEPHONE ENCOUNTER
Labs show an elevated white blood cell count without a clear source of infection.  We will cover with a broad spectrum antibiotic to address the issue.  Repeat labs after treatment.

## 2024-02-13 ENCOUNTER — TELEPHONE (OUTPATIENT)
Dept: INTERNAL MEDICINE | Facility: CLINIC | Age: 84
End: 2024-02-13
Payer: MEDICARE

## 2024-02-13 NOTE — TELEPHONE ENCOUNTER
"Spoke with pt.  Pt declines going to ED.  Pt asked if she should cancel f/u visit.  Advised to keep f/u but if she is feeling weaker then please go to ER.  Pt said she didn't "want to be a bother, just forget about it"  "

## 2024-02-13 NOTE — TELEPHONE ENCOUNTER
Need more information. We started antibiotics for her elevated wbc to treat empirically for infection due to her elevated wbc count.  We are not doing anything else at this time.    If she is feeling worse she needs to go to the ED

## 2024-02-13 NOTE — TELEPHONE ENCOUNTER
----- Message from Neris Perales sent at 2/13/2024  3:35 PM CST -----  Contact: Migdalia  Pt is calling in regards to results of blood test and has concerns and would like a call back today. Also, pt wants to know is there something she needs to do to improve blood test and pt is very weak. Pt can be reached at [phone].          Thanks  CARLITOS

## 2024-04-17 RX ORDER — ZOLPIDEM TARTRATE 10 MG/1
TABLET ORAL
Qty: 90 TABLET | Refills: 0 | Status: SHIPPED | OUTPATIENT
Start: 2024-04-17

## 2024-04-17 NOTE — TELEPHONE ENCOUNTER
Refill Routing Note   Medication(s) are not appropriate for processing by [facility] for the following reason(s):        Outside of protocol    [facility] action(s):  Route               Appointments  past 12m or future 3m with PCP    Date Provider   Last Visit   2/8/2024 John Allen MD   Next Visit   Visit date not found John Allen MD   ED visits in past 90 days: 0        Note composed:2:15 PM 04/17/2024

## 2024-05-02 ENCOUNTER — OFFICE VISIT (OUTPATIENT)
Dept: PAIN MEDICINE | Facility: CLINIC | Age: 84
End: 2024-05-02
Payer: MEDICARE

## 2024-05-02 VITALS
RESPIRATION RATE: 18 BRPM | HEART RATE: 88 BPM | WEIGHT: 136.88 LBS | BODY MASS INDEX: 22.81 KG/M2 | SYSTOLIC BLOOD PRESSURE: 126 MMHG | HEIGHT: 65 IN | DIASTOLIC BLOOD PRESSURE: 79 MMHG

## 2024-05-02 DIAGNOSIS — M79.18 MYALGIA, OTHER SITE: Primary | ICD-10-CM

## 2024-05-02 DIAGNOSIS — M79.12 MYALGIA OF AUXILIARY MUSCLES, HEAD AND NECK: ICD-10-CM

## 2024-05-02 DIAGNOSIS — M41.9 SCOLIOSIS, UNSPECIFIED SCOLIOSIS TYPE, UNSPECIFIED SPINAL REGION: ICD-10-CM

## 2024-05-02 PROCEDURE — 20553 NJX 1/MLT TRIGGER POINTS 3/>: CPT | Mod: HCNC,S$GLB,, | Performed by: PHYSICAL MEDICINE & REHABILITATION

## 2024-05-02 PROCEDURE — 99214 OFFICE O/P EST MOD 30 MIN: CPT | Mod: 25,HCNC,S$GLB, | Performed by: PHYSICAL MEDICINE & REHABILITATION

## 2024-05-02 PROCEDURE — 3288F FALL RISK ASSESSMENT DOCD: CPT | Mod: HCNC,CPTII,S$GLB, | Performed by: PHYSICAL MEDICINE & REHABILITATION

## 2024-05-02 PROCEDURE — 99999 PR PBB SHADOW E&M-EST. PATIENT-LVL III: CPT | Mod: PBBFAC,HCNC,, | Performed by: PHYSICAL MEDICINE & REHABILITATION

## 2024-05-02 PROCEDURE — 3074F SYST BP LT 130 MM HG: CPT | Mod: HCNC,CPTII,S$GLB, | Performed by: PHYSICAL MEDICINE & REHABILITATION

## 2024-05-02 PROCEDURE — 1125F AMNT PAIN NOTED PAIN PRSNT: CPT | Mod: HCNC,CPTII,S$GLB, | Performed by: PHYSICAL MEDICINE & REHABILITATION

## 2024-05-02 PROCEDURE — 1101F PT FALLS ASSESS-DOCD LE1/YR: CPT | Mod: HCNC,CPTII,S$GLB, | Performed by: PHYSICAL MEDICINE & REHABILITATION

## 2024-05-02 PROCEDURE — 1159F MED LIST DOCD IN RCRD: CPT | Mod: HCNC,CPTII,S$GLB, | Performed by: PHYSICAL MEDICINE & REHABILITATION

## 2024-05-02 PROCEDURE — 3078F DIAST BP <80 MM HG: CPT | Mod: HCNC,CPTII,S$GLB, | Performed by: PHYSICAL MEDICINE & REHABILITATION

## 2024-05-02 RX ORDER — KETOROLAC TROMETHAMINE 30 MG/ML
30 INJECTION, SOLUTION INTRAMUSCULAR; INTRAVENOUS ONCE
Status: COMPLETED | OUTPATIENT
Start: 2024-05-02 | End: 2024-05-02

## 2024-05-02 RX ORDER — METHYLPREDNISOLONE ACETATE 40 MG/ML
40 INJECTION, SUSPENSION INTRA-ARTICULAR; INTRALESIONAL; INTRAMUSCULAR; SOFT TISSUE
Status: COMPLETED | OUTPATIENT
Start: 2024-05-02 | End: 2024-05-02

## 2024-05-02 RX ADMIN — METHYLPREDNISOLONE ACETATE 40 MG: 40 INJECTION, SUSPENSION INTRA-ARTICULAR; INTRALESIONAL; INTRAMUSCULAR; SOFT TISSUE at 05:05

## 2024-05-02 RX ADMIN — KETOROLAC TROMETHAMINE 30 MG: 30 INJECTION, SOLUTION INTRAMUSCULAR; INTRAVENOUS at 05:05

## 2024-05-02 NOTE — PROGRESS NOTES
Established Patient Chronic Pain Note (Follow up visit)    Chief Complaint:   Chief Complaint   Patient presents with    Low-back Pain     Patient has pain in lower back, denies pain in legs. Pain level 8/10       SUBJECTIVE:    Migdalia Luna is a 84 y.o. female who presents to the clinic for a follow-up appointment for chronic upper and lower back pain. Since the last visit, Migdalia Luna states the pain has been worsening, most notably in the right lower lumbar area. Current pain intensity is 8/10.  She denies any new injuries or trauma.    Patient denies night fever/night sweats, urinary incontinence, bowel incontinence, significant weight loss, significant motor weakness, and loss of sensations.    Pain Disability Index Review:      5/2/2024     2:47 PM 2/1/2024     3:38 PM   Last 3 PDI Scores   Pain Disability Index (PDI) 56 0       Initial HPI 02/01/2024:  Migdalia Luna is a 84 y.o. female who presents to the clinic for the evaluation of chronic back pain.  She was referred by her primary care team for further evaluation management of this pain.  She has past medical history of migraine, depression, hypertension, hyperlipidemia, osteoporosis, GERD, insomnia, and multiple other medical comorbidities as listed in her chart.  The pain started 15+ years ago  and symptoms have been worsening.The pain is located in the midthoracic area.  The pain is described as  dull, aching  and is rated as 0/10. The pain is rated with a score of  0/10 on the BEST day and a score of 10/10 on the WORST day.  Symptoms interfere with daily activity. The pain is exacerbated by increased movement.  The pain is mitigated by rest.              Non-Pharmacologic Treatments:  Physical Therapy/Home Exercise: no  Ice/Heat:yes  TENS: no  Acupuncture: no  Massage: yes  Chiropractic: no    Other: no        Pain Medications:  - Opioids:  Norco  - Adjuvant Medications:  Ambien, Lyrica, Fosamax, Mobic, Robaxin, Imitrex, trazodone  -  Anti-Coagulants:  None      report:  Reviewed and consistent with medication use as prescribed.     Pain Procedures:   -previous interventions, unknown types        Imaging:   CT abdomen pelvis 07/11/2022:  Visualized lung bases are clear.     Abdomen: The liver, spleen, adrenals, kidneys, and pancreas are within normal limits on this noncontrast exam. Cholelithiasis. No ascites or adenopathy. The abdominal aorta is nonaneurysmal.     Large hiatal hernia. There is inflammatory stranding associated with the right hemicolon. There are a few prominent mesenteric lymph nodes in the right lower quadrant. Normal caliber appendix.     Pelvis: No pelvic free fluid, mass, or adenopathy.        PMHx,PSHx, Social history, and Family history:  I have reviewed the patient's medical, surgical, social, and family history in detail and updated the computerized patient record.    Review of patient's allergies indicates:  No Known Allergies    Current Outpatient Medications   Medication Sig Dispense Refill    alendronate (FOSAMAX) 70 MG tablet TAKE 1 TABLET EVERY 7 DAYS 12 tablet 3    BUPivacaine (MARCAINE) 0.5 % (5 mg/mL) injection       meloxicam (MOBIC) 7.5 MG tablet Take 1 tablet (7.5 mg total) by mouth once daily. 180 tablet 0    methocarbamoL (ROBAXIN) 500 MG Tab TAKE 1 TABLET EVERY NIGHT AS NEEDED FOR PAIN/CRAMPS 90 tablet 0    multivitamin capsule Take 1 capsule by mouth once daily.      olmesartan-amLODIPin-hcthiazid 40-10-12.5 mg Tab       olmesartan-hydrochlorothiazide (BENICAR HCT) 40-12.5 mg Tab       senna (SENOKOT) 8.6 mg tablet Take 1 tablet by mouth once daily.      sumatriptan (IMITREX) 100 MG tablet TAKE 1 TABLET WITH ONSET OF HEADACHE, MAY REPEAT ONE TIME TWO HOURS LATER IF NEEDED 27 tablet 3    zolpidem (AMBIEN) 10 mg Tab TAKE 1 TABLET EVERY NIGHT AS NEEDED 90 tablet 0     No current facility-administered medications for this visit.         REVIEW OF SYSTEMS:    GENERAL:  No weight loss, malaise or  "fevers.  HEENT:   No recent changes in vision or hearing  NECK:  Negative for lumps, no difficulty with swallowing.  RESPIRATORY:  Negative for cough, wheezing or shortness of breath, patient denies any recent URI.  CARDIOVASCULAR:  Negative for chest pain, leg swelling or palpitations.  GI:  Negative for abdominal discomfort, blood in stools or black stools or change in bowel habits.  MUSCULOSKELETAL:  See HPI.  SKIN:  Negative for lesions, rash, and itching.  PSYCH:  No mood disorder or recent psychosocial stressors.  Patients sleep is not disturbed secondary to pain.  HEMATOLOGY/LYMPHOLOGY:  Negative for prolonged bleeding, bruising easily or swollen nodes.  Patient is not currently taking any anti-coagulants  NEURO:   No history of headaches, syncope, paralysis, seizures or tremors.  All other reviewed and negative other than HPI.    OBJECTIVE:    /79   Pulse 88   Resp 18   Ht 5' 5" (1.651 m)   Wt 62.1 kg (136 lb 14.5 oz)   BMI 22.78 kg/m²     PHYSICAL EXAMINATION:    GENERAL: Well appearing, in no acute distress, alert and oriented x3.  PSYCH:  Mood and affect appropriate.  SKIN: Skin color, texture, turgor normal, no rashes or lesions.  HEAD/FACE:  Normocephalic, atraumatic. Cranial nerves grossly intact.  CV: RRR with palpation of the radial artery.  PULM: No evidence of respiratory difficulty, symmetric chest rise.  GI:  Soft and non-tender.  BACK:  Positive Guthrie test.  Straight leg raising in the sitting and supine positions is negative to radicular pain.  Moderate pain to palpation over the thoracic and lumbar paraspinals and lower trapezius.  Limited range of motion secondary to pain reproduction.  Axial loading positive bilaterally in lumbar spine.  EXTREMITIES: No deformities, edema, or skin discoloration. Good capillary refill.  MUSCULOSKELETAL: Shoulder, hip, and knee provocative maneuvers are negative.   Bilateral upper and lower extremity strength is normal and symmetric.  No atrophy or " tone abnormalities are noted.  NEURO: Bilateral upper and lower extremity coordination and muscle stretch reflexes are physiologic and symmetric.  Plantar response are downgoing. No clonus.  No loss of sensation is noted.  GAIT: normal.      LABS:  Lab Results   Component Value Date    WBC 15.34 (H) 02/08/2024    HGB 11.2 (L) 02/08/2024    HCT 35.2 (L) 02/08/2024     (H) 02/08/2024     02/08/2024       CMP  Sodium   Date Value Ref Range Status   02/08/2024 141 136 - 145 mmol/L Final     Potassium   Date Value Ref Range Status   02/08/2024 4.2 3.5 - 5.1 mmol/L Final     Chloride   Date Value Ref Range Status   02/08/2024 109 95 - 110 mmol/L Final     CO2   Date Value Ref Range Status   02/08/2024 23 23 - 29 mmol/L Final     Glucose   Date Value Ref Range Status   02/08/2024 94 70 - 110 mg/dL Final     BUN   Date Value Ref Range Status   02/08/2024 16 8 - 23 mg/dL Final     Creatinine   Date Value Ref Range Status   02/08/2024 0.9 0.5 - 1.4 mg/dL Final     Calcium   Date Value Ref Range Status   02/08/2024 10.0 8.7 - 10.5 mg/dL Final     Total Protein   Date Value Ref Range Status   02/08/2024 6.9 6.0 - 8.4 g/dL Final     Albumin   Date Value Ref Range Status   02/08/2024 3.5 3.5 - 5.2 g/dL Final     Total Bilirubin   Date Value Ref Range Status   02/08/2024 0.6 0.1 - 1.0 mg/dL Final     Comment:     For infants and newborns, interpretation of results should be based  on gestational age, weight and in agreement with clinical  observations.    Premature Infant recommended reference ranges:  Up to 24 hours.............<8.0 mg/dL  Up to 48 hours............<12.0 mg/dL  3-5 days..................<15.0 mg/dL  6-29 days.................<15.0 mg/dL       Alkaline Phosphatase   Date Value Ref Range Status   02/08/2024 106 55 - 135 U/L Final     AST   Date Value Ref Range Status   02/08/2024 17 10 - 40 U/L Final     ALT   Date Value Ref Range Status   02/08/2024 14 10 - 44 U/L Final     Anion Gap   Date Value Ref  "Range Status   02/08/2024 9 8 - 16 mmol/L Final     eGFR if    Date Value Ref Range Status   07/27/2022 >60.0 >60 mL/min/1.73 m^2 Final     eGFR if non    Date Value Ref Range Status   07/27/2022 53.3 (A) >60 mL/min/1.73 m^2 Final     Comment:     Calculation used to obtain the estimated glomerular filtration  rate (eGFR) is the CKD-EPI equation.          No results found for: "LABA1C", "HGBA1C"          ASSESSMENT: 84 y.o. year old female with chronic upper and lower back pain, consistent with     1. Myalgia, other site        2. Scoliosis, unspecified scoliosis type, unspecified spinal region        3. Myalgia of auxiliary muscles, head and neck              PLAN:   - Interventions:  Performed trigger point injections for right-sided myofascial pain in the upper back and lower back today in the clinic.  Explained the risks and benefits of the procedure in detail with the patient today in clinic along with alternative treatment options, and the patient elected to pursue the intervention at this time.  Informed consent obtained, see procedure note below for more details.     Consider lumbar MBB for axial lumbar pain in the future if warranted     - Anticoagulation use:  None        - If no benefit with above procedure, consider  thoracic BETITO .      - Medications: I have stressed the importance of physical activity and a home exercise plan to help with pain and improve health. and Patient can continue with medications for now since they are providing benefits, using them appropriately, and without side effects.           - Therapy:  Advised patient continue with activities and exercises as tolerated     - Labs:  Reviewed     - Imaging: Reviewed available imaging with patient and answered any questions they had regarding study.  Obtain AP and lateral thoracolumbar spine     - Consults/Referrals:  None at this time     - Records:  Reviewed/Obtain old records from outside physicians and " imaging     - Follow up visit: return to clinic as needed     - Counseled patient regarding the importance of activity modification and physical therapy     - This condition does not require this patient to take time off of work, and the primary goal of our Pain Management services is to improve the patient's functional capacity.     - Patient Questions: Answered all of the patient's questions regarding diagnosis, therapy, and treatment         PROCEDURE NOTE:  Trigger Point Injection:   The procedure was discussed with the patient including complications of nerve damage,  bleeding, infection, and failure of pain relief.   Trigger points were identified by palpation and marked. Alcohol swab prep of sites done. A mixture of 3mL 1% lidocaine + 40 mg Depo-Medrol + 30 mg Toradol was prepared (5 mL total).   A 25-gauge needle was advanced to the point of maximal tenderness, and  1 to 1.25mL  was injected after negative aspiration. All sites done in the same manner. Patient tolerated the procedure well and without complications. Patient was monitored for 15 min following the injection before discharged from the clinic.  Sites injected included:  Thoracic paraspinals , middle trapezius, lumbar paraspinals, and gluteal region on the right side    The above plan and management options were discussed at length with patient. Patient is in agreement with the above and verbalized understanding.      Steve Roman MD  Interventional Pain Management  [facility]    Disclaimer:  This note was prepared using voice recognition system and is likely to have sound alike errors that may have been overlooked even after proof reading.  Please call me with any questions

## 2024-05-03 ENCOUNTER — PATIENT MESSAGE (OUTPATIENT)
Dept: PAIN MEDICINE | Facility: CLINIC | Age: 84
End: 2024-05-03
Payer: MEDICARE

## 2024-05-06 DIAGNOSIS — M54.6 CHRONIC THORACIC BACK PAIN, UNSPECIFIED BACK PAIN LATERALITY: ICD-10-CM

## 2024-05-06 DIAGNOSIS — M15.9 PRIMARY OSTEOARTHRITIS INVOLVING MULTIPLE JOINTS: ICD-10-CM

## 2024-05-06 DIAGNOSIS — G89.29 CHRONIC THORACIC BACK PAIN, UNSPECIFIED BACK PAIN LATERALITY: ICD-10-CM

## 2024-05-06 DIAGNOSIS — M79.7 FIBROMYALGIA: ICD-10-CM

## 2024-05-06 RX ORDER — METHOCARBAMOL 500 MG/1
TABLET, FILM COATED ORAL
Qty: 90 TABLET | Refills: 3 | Status: SHIPPED | OUTPATIENT
Start: 2024-05-06

## 2024-07-05 DIAGNOSIS — G43.909 MIGRAINE WITHOUT STATUS MIGRAINOSUS, NOT INTRACTABLE, UNSPECIFIED MIGRAINE TYPE: ICD-10-CM

## 2024-07-05 RX ORDER — ALENDRONATE SODIUM 70 MG/1
TABLET ORAL
Qty: 12 TABLET | Refills: 3 | Status: SHIPPED | OUTPATIENT
Start: 2024-07-05

## 2024-07-05 RX ORDER — SUMATRIPTAN SUCCINATE 100 MG/1
TABLET ORAL
Qty: 27 TABLET | Refills: 0 | Status: SHIPPED | OUTPATIENT
Start: 2024-07-05

## 2024-07-05 NOTE — TELEPHONE ENCOUNTER
Care Due:                  Date            Visit Type   Department     Provider  --------------------------------------------------------------------------------                                ESTABLISHED   [facility] INTERNAL  Last Visit: 02-      PATIENT      MEDICINE       John Allen  Next Visit: None Scheduled  None         None Found                                                            Last  Test          Frequency    Reason                     Performed    Due Date  --------------------------------------------------------------------------------    Mg Level....  12 months..  alendronate..............  Not Found    Overdue    Phosphate...  12 months..  alendronate..............  Not Found    Overdue    Vitamin D...  12 months..  alendronate..............  03- 03-    [facility] Embedded Care Due Messages. Reference number: [id number].   7/05/2024 1:14:53 AM CDT

## 2024-07-05 NOTE — TELEPHONE ENCOUNTER
Refill Routing Note   Medication(s) are not appropriate for processing by [facility] for the following reason(s):        Outside of protocol    [facility] action(s):  Route  Approve     Requires labs : Yes             Appointments  past 12m or future 3m with PCP    Date Provider   Last Visit   2/8/2024 John Allen MD   Next Visit   Visit date not found John Allen MD   ED visits in past 90 days: 0        Note composed:4:37 PM 07/05/2024

## 2024-07-24 NOTE — PROGRESS NOTES
Established Patient Chronic Pain Note (Follow up visit)    Referring Physician: John Allen MD    PCP: John Allen MD     Chief Complaint:   Chief Complaint   Patient presents with    Low-back Pain    Back Pain   Midback pain  Lumbar Back Pain - facet-mediated, axial in nature -- no radicular pain       SUBJECTIVE:    Interval History (7/25/2024):  Patient presents today for follow-up visit.  Patient was last seen almost 3 months ago. At that visit, she had Thoracic paraspinals, middle trapezius, lumbar paraspinals, and right gluteal region TPI with limited pain relief.  Patient reports pain as 5/10 today with medication.  Today, she mostly complains of low back pain, with no radicular pain.  She reports that she has had an epidural in the past with Dr. Jumana Woo with good pain relief.  She last had a lumbar MRI in 2021.  She reports that she was very active up until July of 2023 where she was playing tennis.  Now, the pain is so severe that she is unable to walk.  This has made her mood very down.    Interval HPI (5/2/2024):  Migdalia Luna is a 84 y.o. female who presents to the clinic for a follow-up appointment for chronic upper and lower back pain. Since the last visit, Migdalia Luna states the pain has been worsening, most notably in the right lower lumbar area. Current pain intensity is 8/10.  She denies any new injuries or trauma.Patient denies night fever/night sweats, urinary incontinence, bowel incontinence, significant weight loss, significant motor weakness, and loss of sensations.    Initial HPI 02/01/2024 - Dr. Roman:  Migdalia Luna is a 84 y.o. female who presents to the clinic for the evaluation of chronic back pain.  She was referred by her primary care team for further evaluation management of this pain.  She has past medical history of migraine, depression, hypertension, hyperlipidemia, osteoporosis, GERD, insomnia, and multiple other medical comorbidities as listed in her  chart.  The pain started 15+ years ago  and symptoms have been worsening.The pain is located in the midthoracic area.  The pain is described as  dull, aching  and is rated as 0/10. The pain is rated with a score of  0/10 on the BEST day and a score of 10/10 on the WORST day.  Symptoms interfere with daily activity. The pain is exacerbated by increased movement.  The pain is mitigated by rest.        Pain Disability Index (PDI) Score Review:      7/25/2024    10:19 AM 5/2/2024     2:47 PM 2/1/2024     3:38 PM   Last 3 PDI Scores   Pain Disability Index (PDI) 32 56 0           Non-Pharmacologic Treatments:  Physical Therapy/Home Exercise: no  Ice/Heat:yes  TENS: no  Acupuncture: no  Massage: yes  Chiropractic: no    Other: no        Pain Medications:  - Opioids:  Norco  - Adjuvant Medications:  Ambien, Lyrica, Fosamax, Mobic, Robaxin, Imitrex, trazodone  - Anti-Coagulants:  None        Pain Procedures:   -externally:  previous interventions, unknown types  -in clinic Thoracic paraspinals , middle trapezius, lumbar paraspinals, and right gluteal region TPI on 05/02/2024 with Dr. Roman            Imaging/ Diagnostic Studies/ Labs (Reviewed on 7/25/2024):    7/25/2024 X-Ray Lumbar Complete Including Flex And Ext  FINDINGS:  Alignment is normal on the lateral view although a mild S shaped scoliosis is visible.  There is advanced degenerative disc space narrowing visible at T12-L1 through L5-S1.  Vertebral body heights are maintained. No fractures are identified.. The sacroiliac joints appear normal and symmetrical.  Normal alignment is maintained over a limited range of motion through flexion and extension.  Impression:   1.  Advanced chronic disc degeneration from T12 through S1 and mild S-shaped thoracolumbar scoliosis.  2.  No evidence of acute injury is appreciated.  3.  Normal alignment is maintained over a moderate range of motion through flexion flexion and extension       02/01/2024 X-Ray Thoracolumbar Spine AP  Lateral  FINDINGS:  There is sigmoidal scoliosis thoracolumbar spine with dextroscoliosis thoracic spine, apex at T11, and levoscoliosis lumbar spine, apex at L3-4.  There is no compression fracture.  There is grade 1 spondylolisthesis with 3 mm anterolisthesis L3 on L4.  There is multilevel thoracolumbar degenerative disc disease with disc space narrowing and spurring.  Advanced lower lumbar facet arthropathy is also noted.  There is a large hiatal hernia.  Impression:   1.  Sigmoidal scoliosis thoracolumbar spine with multilevel advanced degenerative sequela.  2.  Large hiatal hernia.      CT abdomen pelvis 07/11/2022:  Visualized lung bases are clear.   Abdomen: The liver, spleen, adrenals, kidneys, and pancreas are within normal limits on this noncontrast exam. Cholelithiasis. No ascites or adenopathy. The abdominal aorta is nonaneurysmal.   Large hiatal hernia. There is inflammatory stranding associated with the right hemicolon. There are a few prominent mesenteric lymph nodes in the right lower quadrant. Normal caliber appendix.   Pelvis: No pelvic free fluid, mass, or adenopathy.        4/22/2021 Lumbar MRI         REVIEW OF SYSTEMS:  GENERAL:  No weight loss, malaise or fevers.  HEENT:   No recent changes in vision or hearing  NECK:  Negative for lumps, no difficulty with swallowing.  RESPIRATORY:  Negative for cough, wheezing or shortness of breath, patient denies any recent URI.  CARDIOVASCULAR:  Negative for chest pain, leg swelling or palpitations.  GI:  Negative for abdominal discomfort, blood in stools or black stools or change in bowel habits.  MUSCULOSKELETAL:  See HPI.  SKIN:  Negative for lesions, rash, and itching.  PSYCH:  No mood disorder or recent psychosocial stressors.  Patients sleep is not disturbed secondary to pain.  HEMATOLOGY/LYMPHOLOGY:  Negative for prolonged bleeding, bruising easily or swollen nodes.  Patient is not currently taking any anti-coagulants  NEURO:   No history of  "headaches, syncope, paralysis, seizures or tremors.  All other reviewed and negative other than HPI.        OBJECTIVE:    PHYSICAL EXAMINATION:  Vitals:    07/25/24 1017   BP: 117/67   Pulse: 83   Resp: 17   Weight: 62 kg (136 lb 11 oz)   Height: 5' 5" (1.651 m)   PainSc:   5    Body mass index is 22.75 kg/m².   (reviewed on 7/25/2024)    GENERAL: Well appearing, in no acute distress, alert and oriented x3.  PSYCH:  Mood and affect appropriate.  SKIN: Skin color, texture, turgor normal, no rashes or lesions.  HEAD/FACE:  Normocephalic, atraumatic. Cranial nerves grossly intact.  PULM: No evidence of respiratory difficulty, symmetric chest rise.  GI:  Soft and non-tender.  BACK:  Positive Guthrie test.  Straight leg raising in the sitting and supine positions is negative to radicular pain.  Moderate pain to palpation over the thoracic and lumbar paraspinals and lower trapezius.  Limited range of motion secondary to pain reproduction.  Axial loading positive bilaterally in lumbar spine.  EXTREMITIES: No deformities, edema, or skin discoloration. Good capillary refill.  MUSCULOSKELETAL: Shoulder, hip, and knee provocative maneuvers are negative.   Bilateral upper and lower extremity strength is normal and symmetric.  No atrophy or tone abnormalities are noted.  NEURO: Bilateral upper and lower extremity coordination and muscle stretch reflexes are physiologic and symmetric.  Plantar response are downgoing. No clonus.  No loss of sensation is noted.  GAIT: normal.              ASSESSMENT: 84 y.o. year old female with chronic upper and lower back pain, consistent with     1. DDD (degenerative disc disease), thoracolumbar        2. Spondylolisthesis, lumbar region        3. Lumbar spondylosis  X-Ray Lumbar Complete Including Flex And Ext    Case Request-RAD/Other Procedure Area: Diagnostic Bilateral L3-5 MBB #1    Case Request-RAD/Other Procedure Area: Diagnostic Bilateral L3-5 MBB #2    Case Request-RAD/Other Procedure " Area: Bilateral L3-5 RFA      4. Lumbar disc herniation        5. Degenerative lumbar spinal stenosis        6. Lumbar foraminal stenosis        7. Thoracic spine pain              PLAN:   - Interventions:    - Schedule Diagnostic bilateral L3-5 MBB #1. Previously explained the risks and benefits of the procedure in detail with the patient in clinic along with alternative treatment options, and the patient elected to pursue the intervention at this time.  At this time, lumbar pain is moderate to severe & more axial in nature, and I think patient would benefit more from lumbar facet joint treatment for lumbar facet-mediated pain. Patient's ADLs are affected by this pain.   Call patient to get for % relief to determine potential RFA eligibility.  1st MBB: if > 80% pain relief, schedule 2nd diagnostic MBB (orders in).                 If <80% pain relief, consider ordering lumbar MRI with consideration for lumbar BETITO.   2nd MBB: if > 80% pain relief, schedule bilateral L3-5 RFA (orders in).     Patient given handout about MBB/RFA, also discussed requirement of 2 diagnostic MBBs prior to RFA.    - Since no benefit from thoracic TPI, consider thoracic BETITO.   - S/p Thoracic paraspinals , middle trapezius, lumbar paraspinals, and right gluteal region TPI on 05/02/2024 in clinic with Dr. Roman.    - Anticoagulation use:  ASA - takes on her own     - Medications:  - No longer taking Mobic or Robaxin.     - LA  reviewed and appropriate.        - Therapy:  Advised patient continue with activities and exercises as tolerated.      - Imaging:   - Order lumbar x-ray w/ flexion & extension.  Addendum: Radiology results reviewed & added to the chart.    - Lumbar MRI (2021) reviewed; consider updating if limited pain relief from lumbar MBB.        - Consults/Referrals:  None at this time    - Records: Sign records release.  Request imaging, office visit notes, and procedure notes from Dr. Jumana Woo.      - Follow up visit: Will  call for % relief to determine RFA eligibility     No future appointments.      Visit today included increased complexity associated with the care of the episodic problem of chronic pain which was addressed and continue to manage the longitudinal care of the patient due to the serious and/or complex managed problem(s) listed above.    - Patient Questions: Answered all of the patient's questions regarding diagnosis, therapy, and treatment.    - This condition does not require this patient to take time off of work, and the primary goal of our Pain Management services is to improve the patient's functional capacity.   - I discussed the risks, benefits, and alternatives to potential treatment options. All questions and concerns were fully addressed today in clinic.         Mindy Sexton PA-C  Interventional Pain Management - [facility]    Disclaimer:  This note was prepared using voice recognition system and is likely to have sound alike errors that may have been overlooked even after proof reading.  Please call me with any questions.

## 2024-07-25 ENCOUNTER — OFFICE VISIT (OUTPATIENT)
Dept: PAIN MEDICINE | Facility: CLINIC | Age: 84
End: 2024-07-25
Payer: MEDICARE

## 2024-07-25 ENCOUNTER — HOSPITAL ENCOUNTER (OUTPATIENT)
Dept: RADIOLOGY | Facility: HOSPITAL | Age: 84
Discharge: HOME | End: 2024-07-25
Attending: PHYSICIAN ASSISTANT
Payer: MEDICARE

## 2024-07-25 VITALS
SYSTOLIC BLOOD PRESSURE: 117 MMHG | HEART RATE: 83 BPM | BODY MASS INDEX: 22.77 KG/M2 | DIASTOLIC BLOOD PRESSURE: 67 MMHG | RESPIRATION RATE: 17 BRPM | WEIGHT: 136.69 LBS | HEIGHT: 65 IN

## 2024-07-25 DIAGNOSIS — M48.061 DEGENERATIVE LUMBAR SPINAL STENOSIS: ICD-10-CM

## 2024-07-25 DIAGNOSIS — M43.16 SPONDYLOLISTHESIS, LUMBAR REGION: ICD-10-CM

## 2024-07-25 DIAGNOSIS — M48.061 LUMBAR FORAMINAL STENOSIS: ICD-10-CM

## 2024-07-25 DIAGNOSIS — M51.26 LUMBAR DISC HERNIATION: ICD-10-CM

## 2024-07-25 DIAGNOSIS — M51.35 DDD (DEGENERATIVE DISC DISEASE), THORACOLUMBAR: Primary | ICD-10-CM

## 2024-07-25 DIAGNOSIS — M47.816 LUMBAR SPONDYLOSIS: ICD-10-CM

## 2024-07-25 DIAGNOSIS — M54.6 THORACIC SPINE PAIN: ICD-10-CM

## 2024-07-25 PROCEDURE — 1160F RVW MEDS BY RX/DR IN RCRD: CPT | Mod: CPTII,S$GLB,, | Performed by: PHYSICIAN ASSISTANT

## 2024-07-25 PROCEDURE — 99999 PR PBB SHADOW E&M-EST. PATIENT-LVL IV: CPT | Mod: PBBFAC,,, | Performed by: PHYSICIAN ASSISTANT

## 2024-07-25 PROCEDURE — 72114 X-RAY EXAM L-S SPINE BENDING: CPT | Mod: TC,FY,PO

## 2024-07-25 PROCEDURE — 99214 OFFICE O/P EST MOD 30 MIN: CPT | Mod: S$GLB,,, | Performed by: PHYSICIAN ASSISTANT

## 2024-07-25 PROCEDURE — 1159F MED LIST DOCD IN RCRD: CPT | Mod: CPTII,S$GLB,, | Performed by: PHYSICIAN ASSISTANT

## 2024-07-25 PROCEDURE — 72114 X-RAY EXAM L-S SPINE BENDING: CPT | Mod: 26,,, | Performed by: RADIOLOGY

## 2024-07-25 PROCEDURE — 1125F AMNT PAIN NOTED PAIN PRSNT: CPT | Mod: CPTII,S$GLB,, | Performed by: PHYSICIAN ASSISTANT

## 2024-07-25 PROCEDURE — 3074F SYST BP LT 130 MM HG: CPT | Mod: CPTII,S$GLB,, | Performed by: PHYSICIAN ASSISTANT

## 2024-07-25 PROCEDURE — G2211 COMPLEX E/M VISIT ADD ON: HCPCS | Mod: S$GLB,,, | Performed by: PHYSICIAN ASSISTANT

## 2024-07-25 PROCEDURE — 3078F DIAST BP <80 MM HG: CPT | Mod: CPTII,S$GLB,, | Performed by: PHYSICIAN ASSISTANT

## 2024-07-25 RX ORDER — NAPROXEN SODIUM 220 MG/1
81 TABLET, FILM COATED ORAL DAILY
COMMUNITY

## 2024-07-30 NOTE — PRE-PROCEDURE INSTRUCTIONS
Spoke with patient regarding procedure scheduled on 8.6     Arrival time 0815     Has patient been sick with fever or on antibiotics within the last 7 days? No     Does the patient have any open wounds, sores or rashes? No     Does the patient have any recent fractures? no     Has patient received a vaccination within the last 7 days? No     Received the COVID vaccination?      Has the patient stopped all medications as directed? na    Does patient have a pacemaker, defibrillator, or implantable stimulator? No     Does the patient have a ride to and from procedure and someone reliable to remain with patient?       Is the patient diabetic? no     Does the patient have sleep apnea? Or use O2 at home? no     Is the patient receiving sedation?      Is the patient instructed to remain NPO beginning at midnight the night before their procedure? yes     Procedure location confirmed with patient? Yes     Covid- Denies signs/symptoms. Instructed to notify PAT/MD if any changes.

## 2024-08-06 ENCOUNTER — HOSPITAL ENCOUNTER (OUTPATIENT)
Facility: HOSPITAL | Age: 84
Discharge: HOME | End: 2024-08-06
Attending: PHYSICAL MEDICINE & REHABILITATION | Admitting: PHYSICAL MEDICINE & REHABILITATION
Payer: MEDICARE

## 2024-08-06 VITALS
WEIGHT: 134.81 LBS | OXYGEN SATURATION: 99 % | RESPIRATION RATE: 18 BRPM | BODY MASS INDEX: 22.46 KG/M2 | TEMPERATURE: 98 F | DIASTOLIC BLOOD PRESSURE: 87 MMHG | SYSTOLIC BLOOD PRESSURE: 144 MMHG | HEIGHT: 65 IN | HEART RATE: 76 BPM

## 2024-08-06 DIAGNOSIS — M79.7 FIBROMYALGIA: ICD-10-CM

## 2024-08-06 DIAGNOSIS — M47.816 LUMBAR SPONDYLOSIS: Primary | ICD-10-CM

## 2024-08-06 DIAGNOSIS — M54.6 CHRONIC THORACIC BACK PAIN, UNSPECIFIED BACK PAIN LATERALITY: ICD-10-CM

## 2024-08-06 DIAGNOSIS — G89.29 CHRONIC THORACIC BACK PAIN, UNSPECIFIED BACK PAIN LATERALITY: ICD-10-CM

## 2024-08-06 DIAGNOSIS — M15.9 PRIMARY OSTEOARTHRITIS INVOLVING MULTIPLE JOINTS: ICD-10-CM

## 2024-08-06 PROCEDURE — 64494 INJ PARAVERT F JNT L/S 2 LEV: CPT | Mod: 50 | Performed by: PHYSICAL MEDICINE & REHABILITATION

## 2024-08-06 PROCEDURE — 63600175 PHARM REV CODE 636 W HCPCS: Mod: JZ,JG | Performed by: PHYSICAL MEDICINE & REHABILITATION

## 2024-08-06 PROCEDURE — 64494 INJ PARAVERT F JNT L/S 2 LEV: CPT | Mod: 50,,, | Performed by: PHYSICAL MEDICINE & REHABILITATION

## 2024-08-06 PROCEDURE — 64493 INJ PARAVERT F JNT L/S 1 LEV: CPT | Mod: 50,,, | Performed by: PHYSICAL MEDICINE & REHABILITATION

## 2024-08-06 PROCEDURE — 64493 INJ PARAVERT F JNT L/S 1 LEV: CPT | Mod: 50 | Performed by: PHYSICAL MEDICINE & REHABILITATION

## 2024-08-06 RX ORDER — MIDAZOLAM HYDROCHLORIDE 1 MG/ML
INJECTION INTRAMUSCULAR; INTRAVENOUS
Status: DISCONTINUED | OUTPATIENT
Start: 2024-08-06 | End: 2024-08-06 | Stop reason: HOSPADM

## 2024-08-06 RX ORDER — ONDANSETRON HYDROCHLORIDE 2 MG/ML
4 INJECTION, SOLUTION INTRAVENOUS ONCE AS NEEDED
Status: DISCONTINUED | OUTPATIENT
Start: 2024-08-06 | End: 2024-08-06 | Stop reason: HOSPADM

## 2024-08-06 RX ORDER — METHOCARBAMOL 750 MG/1
750 TABLET, FILM COATED ORAL NIGHTLY PRN
Qty: 90 TABLET | Refills: 1 | Status: SHIPPED | OUTPATIENT
Start: 2024-08-06

## 2024-08-06 RX ORDER — FENTANYL CITRATE 50 UG/ML
INJECTION, SOLUTION INTRAMUSCULAR; INTRAVENOUS
Status: DISCONTINUED | OUTPATIENT
Start: 2024-08-06 | End: 2024-08-06 | Stop reason: HOSPADM

## 2024-08-06 RX ORDER — BUPIVACAINE HYDROCHLORIDE 5 MG/ML
INJECTION, SOLUTION EPIDURAL; INTRACAUDAL
Status: DISCONTINUED | OUTPATIENT
Start: 2024-08-06 | End: 2024-08-06 | Stop reason: HOSPADM

## 2024-08-06 NOTE — DISCHARGE SUMMARY
Discharge Note  Short Stay      SUMMARY     Admit Date: 8/6/2024    Attending Physician: Steve Roman MD        Discharge Physician: Steve Roman MD        Discharge Date: 8/6/2024 9:29 AM    Procedure(s) (LRB):  Diagnostic Bilateral L3-5 MBB #1 (Bilateral)    Final Diagnosis: Lumbar spondylosis [M47.816]    Disposition: Home or self care    Patient Instructions:   Current Discharge Medication List        CONTINUE these medications which have NOT CHANGED    Details   aspirin 81 MG Chew Take 81 mg by mouth once daily.      olmesartan-hydrochlorothiazide (BENICAR HCT) 40-12.5 mg Tab       alendronate (FOSAMAX) 70 MG tablet TAKE 1 TABLET EVERY 7 DAYS  Qty: 12 tablet, Refills: 3      BUPivacaine (MARCAINE) 0.5 % (5 mg/mL) injection       meloxicam (MOBIC) 7.5 MG tablet Take 1 tablet (7.5 mg total) by mouth once daily.  Qty: 180 tablet, Refills: 0      methocarbamoL (ROBAXIN) 500 MG Tab TAKE 1 TABLET PO EVERY NIGHT AS NEEDED FOR PAIN/CRAMPS  Qty: 90 tablet, Refills: 3    Associated Diagnoses: Primary osteoarthritis involving multiple joints; Chronic thoracic back pain, unspecified back pain laterality; Fibromyalgia      olmesartan-amLODIPin-hcthiazid 40-10-12.5 mg Tab       senna (SENOKOT) 8.6 mg tablet Take 1 tablet by mouth once daily.      sumatriptan (IMITREX) 100 MG tablet TAKE 1 TABLET WITH ONSET OF HEADACHE, MAY REPEAT ONE TIME TWO HOURS LATER IF NEEDED  Qty: 27 tablet, Refills: 0    Associated Diagnoses: Migraine without status migrainosus, not intractable, unspecified migraine type      zolpidem (AMBIEN) 10 mg Tab TAKE 1 TABLET EVERY NIGHT AS NEEDED  Qty: 90 tablet, Refills: 0                 Discharge Diagnosis: Lumbar spondylosis [M47.816]  Condition on Discharge: Stable with no complications to procedure   Diet on Discharge: Same as before.  Activity: as per instruction sheet.  Discharge to: Home with a responsible adult.  Follow up: 2-4 weeks       Please call the office at [phone] if you  experience any weakness or loss of sensation, fever > 101.5, pain uncontrolled with oral medications, persistent nausea/vomiting/or diarrhea, redness or drainage from the incisions, or any other worrisome concerns. If physician on call was not reached or could not communicate with our office for any reason please go to the nearest emergency department

## 2024-08-06 NOTE — H&P
HPI  Patient presenting for Procedure(s) (LRB):  Diagnostic Bilateral L3-5 MBB #1 (Bilateral)     No health changes since previous encounter    Past Medical History:   Diagnosis Date    Anemia     Arthritis     left shoulder    Asthma     Chronic bronchitis     Depression     Essential hypertension     GERD (gastroesophageal reflux disease)     History of colon polyps     Due for colonoscopy 2021    Hyperlipidemia     Insomnia     Migraine     Osteoporosis     started on fosamax 10/22    Thyroid disease      Past Surgical History:   Procedure Laterality Date    BACK SURGERY      epidural    BREAST BIOPSY Right over 10 yrs ago    benign    COLONOSCOPY N/A 07/11/2016    Procedure: COLONOSCOPY;  Surgeon: Yordy Penn MD;  Location: [facility] ENDO;  Service: Endoscopy;  Laterality: N/A;    ESOPHAGOGASTRODUODENOSCOPY N/A 02/23/2023    Procedure: EGD (ESOPHAGOGASTRODUODENOSCOPY);  Surgeon: Yao Palmer MD;  Location: [facility];  Service: Endoscopy;  Laterality: N/A;    EYE SURGERY      JOINT REPLACEMENT      KNEE ARTHROPLASTY      KNEE ARTHROSCOPY      bilaterly    TONSILLECTOMY      TUBAL LIGATION       Review of patient's allergies indicates:  No Known Allergies     No current facility-administered medications on file prior to encounter.     Current Outpatient Medications on File Prior to Encounter   Medication Sig Dispense Refill    aspirin 81 MG Chew Take 81 mg by mouth once daily.      olmesartan-hydrochlorothiazide (BENICAR HCT) 40-12.5 mg Tab       alendronate (FOSAMAX) 70 MG tablet TAKE 1 TABLET EVERY 7 DAYS 12 tablet 3    BUPivacaine (MARCAINE) 0.5 % (5 mg/mL) injection       meloxicam (MOBIC) 7.5 MG tablet Take 1 tablet (7.5 mg total) by mouth once daily. (Patient not taking: Reported on 7/25/2024) 180 tablet 0    methocarbamoL (ROBAXIN) 500 MG Tab TAKE 1 TABLET PO EVERY NIGHT AS NEEDED FOR PAIN/CRAMPS (Patient not taking: Reported on 7/25/2024) 90 tablet 3    olmesartan-amLODIPin-hcthiazid 40-10-12.5  "mg Tab       senna (SENOKOT) 8.6 mg tablet Take 1 tablet by mouth once daily.      sumatriptan (IMITREX) 100 MG tablet TAKE 1 TABLET WITH ONSET OF HEADACHE, MAY REPEAT ONE TIME TWO HOURS LATER IF NEEDED 27 tablet 0    zolpidem (AMBIEN) 10 mg Tab TAKE 1 TABLET EVERY NIGHT AS NEEDED 90 tablet 0        PMHx, PSHx, Allergies, Medications reviewed in epic    ROS negative except pain complaints in HPI    OBJECTIVE:    BP (!) 158/79 (BP Location: Right arm, Patient Position: Sitting)   Pulse 73   Temp 97.2 °F (36.2 °C) (Temporal)   Resp 16   Ht 5' 5" (1.651 m)   Wt 61.1 kg (134 lb 13 oz)   SpO2 97%   Breastfeeding No   BMI 22.43 kg/m²     PHYSICAL EXAMINATION:    GENERAL: Well appearing, in no acute distress, alert and oriented x3.  PSYCH:  Mood and affect appropriate.  SKIN: Skin color, texture, turgor normal, no rashes or lesions which will impact the procedure.  CV: RRR with palpation of the radial artery.  PULM: No evidence of respiratory difficulty, symmetric chest rise. Clear to auscultation.  NEURO: Cranial nerves grossly intact.    Plan:    Proceed with procedure as planned Procedure(s) (LRB):  Diagnostic Bilateral L3-5 MBB #1 (Bilateral)    Steve Roman MD  08/06/2024            "

## 2024-08-06 NOTE — DISCHARGE INSTRUCTIONS

## 2024-08-06 NOTE — OP NOTE
LUMBAR Medial Branch Block (DIAGNOSTIC) Under Fluoroscopy  Time-out taken to identify patient and procedure side prior to starting the procedure.            08/06/2024                                                         Patient has clinical and imaging findings suggestive of facet mediated pain.    For supporting clinical documentation, please see most recent clinic and telephone notes.      PROCEDURE:  Bilateral medial branch block at the transverse processes at the level of L4, L5, Sacral ala    REASON FOR PROCEDURE: Lumbar spondylosis [M47.816]    PHYSICIAN: Steve Roman MD  ASSISTANTS: None    MEDICATIONS INJECTED: 0.5% bupivicane, 1mL at each level    LOCAL ANESTHETIC USED: Xylocaine 1% 10ml    SEDATION MEDICATIONS: Conscious sedation provided by M.D    The patient was monitored with continuous pulse oximetry, EKG, and intermittent blood pressure monitors, immediately prior to administration of sedation.  The patient was hemodynamically stable throughout the entire process was responsive to voice, and breathing spontaneously.  Supplemental O2 was provided at 2L/min via nasal cannula.  Patient was comfortable for the duration of the procedure.     There was a total of 2mg IV Midazolam and 50mcg Fentanyl titrated for the procedure    Total sedation time was >10minutes and <20minutes      ESTIMATED BLOOD LOSS:  None.    COMPLICATIONS:  None.    TECHNIQUE: Laying in a prone position, the patient was prepped and draped in the usual sterile fashion using ChloraPrep and fenestrated drape.  The level was determined under fluoroscopic guidance.  Local anesthetic was given by going down to the hub of the 27-gauge 1.25in needle and raising a wheel.  A 22-gauge 3.5inch needle was introduced to the anatomic local of the medial branch at each of the above levels using fluoroscopy in the AP, oblique, and lateral views.  After negative aspiration, medication was injected slowly. The patient tolerated the procedure  well.       The patient was monitored after the procedure.  Patient was given post procedure and discharge instructions to follow at home.  We will see the patient back in two weeks or the patient may call to inform of status. The patient was discharged in a stable condition

## 2024-08-07 RX ORDER — ZOLPIDEM TARTRATE 10 MG/1
TABLET ORAL
Qty: 90 TABLET | Refills: 0 | Status: SHIPPED | OUTPATIENT
Start: 2024-08-07

## 2024-08-14 ENCOUNTER — TELEPHONE (OUTPATIENT)
Dept: PAIN MEDICINE | Facility: CLINIC | Age: 84
End: 2024-08-14
Payer: MEDICARE

## 2024-08-14 NOTE — TELEPHONE ENCOUNTER
Reach out to the patient to received their % relief from the MBB procedure. Pt reported 80 %. For a 3-4 days.  Inform patient that I will inform the provider and   we will see them at their follow up appointment. Pt understood.

## 2024-08-15 NOTE — PROGRESS NOTES
Established Patient Chronic Pain Note (Follow up visit)    Referring Physician: John Allen MD    PCP: John Allen MD     Chief Complaint:   Chief Complaint   Patient presents with    Back Pain     Upper, Mid, and lower back   Midback pain  Lumbar Back Pain - facet-mediated, axial in nature -- no radicular pain       SUBJECTIVE:  Interval History (8/22/2024):  Patient Migdalia Luna presents today for follow-up visit.  Patient was last seen on  8/6/2024 bilateral L3-5 MBB with 80% relief x 1 day. She is here today because of a lack of continued pain relief and did not realize that she was not to be expected to have long term pain relief form the block procedure. She continues to have axial low back pain without radiculopathy.  Patient denies night fever/night sweats, urinary incontinence, bowel incontinence, significant weight loss and significant motor weakness.   Patient denies any other complaints or concerns at this time.       Interval History (7/25/2024):  Patient presents today for follow-up visit.  Patient was last seen almost 3 months ago. At that visit, she had Thoracic paraspinals, middle trapezius, lumbar paraspinals, and right gluteal region TPI with limited pain relief.  Patient reports pain as 5/10 today with medication.  Today, she mostly complains of low back pain, with no radicular pain.  She reports that she has had an epidural in the past with Dr. Jumana Woo with good pain relief.  She last had a lumbar MRI in 2021.  She reports that she was very active up until July of 2023 where she was playing tennis.  Now, the pain is so severe that she is unable to walk.  This has made her mood very down.    Interval HPI (5/2/2024):  Migdalia Luna is a 84 y.o. female who presents to the clinic for a follow-up appointment for chronic upper and lower back pain. Since the last visit, Migdalia Luna states the pain has been worsening, most notably in the right lower lumbar area. Current pain  intensity is 8/10.  She denies any new injuries or trauma.Patient denies night fever/night sweats, urinary incontinence, bowel incontinence, significant weight loss, significant motor weakness, and loss of sensations.    Initial HPI 02/01/2024 - Dr. Roman:  Migdalia Luna is a 84 y.o. female who presents to the clinic for the evaluation of chronic back pain.  She was referred by her primary care team for further evaluation management of this pain.  She has past medical history of migraine, depression, hypertension, hyperlipidemia, osteoporosis, GERD, insomnia, and multiple other medical comorbidities as listed in her chart.  The pain started 15+ years ago  and symptoms have been worsening.The pain is located in the midthoracic area.  The pain is described as  dull, aching  and is rated as 0/10. The pain is rated with a score of  0/10 on the BEST day and a score of 10/10 on the WORST day.  Symptoms interfere with daily activity. The pain is exacerbated by increased movement.  The pain is mitigated by rest.        Pain Disability Index (PDI) Score Review:      8/22/2024    10:40 AM 7/25/2024    10:19 AM 5/2/2024     2:47 PM   Last 3 PDI Scores   Pain Disability Index (PDI) 50 32 56           Non-Pharmacologic Treatments:  Physical Therapy/Home Exercise: no  Ice/Heat:yes  TENS: no  Acupuncture: no  Massage: yes  Chiropractic: no    Other: no        Pain Medications:  - Opioids:  Norco  - Adjuvant Medications:  Ambien, Lyrica, Fosamax, Mobic, Robaxin, Imitrex, trazodone  - Anti-Coagulants:  None        Pain Procedures:   -externally:  previous interventions, unknown types  -in clinic Thoracic paraspinals , middle trapezius, lumbar paraspinals, and right gluteal region TPI on 05/02/2024 with Dr. Roman            Imaging/ Diagnostic Studies/ Labs (Reviewed on 8/22/2024):    7/25/2024 X-Ray Lumbar Complete Including Flex And Ext  FINDINGS:  Alignment is normal on the lateral view although a mild S shaped scoliosis is  visible.  There is advanced degenerative disc space narrowing visible at T12-L1 through L5-S1.  Vertebral body heights are maintained. No fractures are identified.. The sacroiliac joints appear normal and symmetrical.  Normal alignment is maintained over a limited range of motion through flexion and extension.  Impression:   1.  Advanced chronic disc degeneration from T12 through S1 and mild S-shaped thoracolumbar scoliosis.  2.  No evidence of acute injury is appreciated.  3.  Normal alignment is maintained over a moderate range of motion through flexion flexion and extension       02/01/2024 X-Ray Thoracolumbar Spine AP Lateral  FINDINGS:  There is sigmoidal scoliosis thoracolumbar spine with dextroscoliosis thoracic spine, apex at T11, and levoscoliosis lumbar spine, apex at L3-4.  There is no compression fracture.  There is grade 1 spondylolisthesis with 3 mm anterolisthesis L3 on L4.  There is multilevel thoracolumbar degenerative disc disease with disc space narrowing and spurring.  Advanced lower lumbar facet arthropathy is also noted.  There is a large hiatal hernia.  Impression:   1.  Sigmoidal scoliosis thoracolumbar spine with multilevel advanced degenerative sequela.  2.  Large hiatal hernia.      CT abdomen pelvis 07/11/2022:  Visualized lung bases are clear.   Abdomen: The liver, spleen, adrenals, kidneys, and pancreas are within normal limits on this noncontrast exam. Cholelithiasis. No ascites or adenopathy. The abdominal aorta is nonaneurysmal.   Large hiatal hernia. There is inflammatory stranding associated with the right hemicolon. There are a few prominent mesenteric lymph nodes in the right lower quadrant. Normal caliber appendix.   Pelvis: No pelvic free fluid, mass, or adenopathy.        4/22/2021 Lumbar MRI         REVIEW OF SYSTEMS:  GENERAL:  No weight loss, malaise or fevers.  HEENT:   No recent changes in vision or hearing  NECK:  Negative for lumps, no difficulty with  "swallowing.  RESPIRATORY:  Negative for cough, wheezing or shortness of breath, patient denies any recent URI.  CARDIOVASCULAR:  Negative for chest pain, leg swelling or palpitations.  GI:  Negative for abdominal discomfort, blood in stools or black stools or change in bowel habits.  MUSCULOSKELETAL:  See HPI.  SKIN:  Negative for lesions, rash, and itching.  PSYCH:  No mood disorder or recent psychosocial stressors.  Patients sleep is not disturbed secondary to pain.  HEMATOLOGY/LYMPHOLOGY:  Negative for prolonged bleeding, bruising easily or swollen nodes.  Patient is not currently taking any anti-coagulants  NEURO:   No history of headaches, syncope, paralysis, seizures or tremors.  All other reviewed and negative other than HPI.        OBJECTIVE:    PHYSICAL EXAMINATION:  Vitals:    08/22/24 1045   BP: 128/81   Pulse: 96   Resp: 17   Weight: 61.2 kg (134 lb 14.7 oz)   Height: 5' 5" (1.651 m)   PainSc: 0-No pain   PainLoc: Back      Body mass index is 22.45 kg/m².   (reviewed on 8/22/2024)    GENERAL: Well appearing, in no acute distress, alert and oriented x3.  PSYCH:  Mood and affect appropriate.  SKIN: Skin color, texture, turgor normal, no rashes or lesions.  HEAD/FACE:  Normocephalic, atraumatic. Cranial nerves grossly intact.  PULM: No evidence of respiratory difficulty, symmetric chest rise.  GI:  Soft and non-tender.  BACK:  Positive Guthrie test.  Straight leg raising in the sitting and supine positions is negative to radicular pain.  Moderate pain to palpation over the thoracic and lumbar paraspinals and lower trapezius.  Limited range of motion secondary to pain reproduction.  Axial loading positive bilaterally in lumbar spine.  EXTREMITIES: No deformities, edema, or skin discoloration. Good capillary refill.  MUSCULOSKELETAL: Shoulder, hip, and knee provocative maneuvers are negative.   Bilateral upper and lower extremity strength is normal and symmetric.  No atrophy or tone abnormalities are " noted.  NEURO: Bilateral upper and lower extremity coordination and muscle stretch reflexes are physiologic and symmetric.  Plantar response are downgoing. No clonus.  No loss of sensation is noted.  GAIT: normal.              ASSESSMENT: 84 y.o. year old female with chronic upper and lower back pain, consistent with     1. Lumbar spondylosis        2. Lumbar foraminal stenosis        3. DDD (degenerative disc disease), thoracolumbar                PLAN:   - Interventions:    - Schedule Diagnostic bilateral L3-5 MBB #2. Previously explained the risks and benefits of the procedure in detail with the patient in clinic along with alternative treatment options, and the patient elected to pursue the intervention at this time.  At this time, lumbar pain is moderate to severe & more axial in nature, and I think patient would benefit more from lumbar facet joint treatment for lumbar facet-mediated pain. Patient's ADLs are affected by this pain.   Call patient to get for % relief to determine potential RFA eligibility.  80% relief with 1st MBB  2nd MBB: if > 80% pain relief, schedule bilateral L3-5 RFA (orders in).      If <80% pain relief, consider ordering lumbar MRI with consideration for lumbar BETITO.       - Since no benefit from thoracic TPI, consider thoracic BETITO.   - S/p Thoracic paraspinals , middle trapezius, lumbar paraspinals, and right gluteal region TPI on 05/02/2024 in clinic with Dr. Roman.    - Anticoagulation use:  ASA - takes on her own     - Medications:  - No longer taking Mobic   She continues to take robaxin 750mg QHS We have discussed potential deleterious side effects associated with this medication including  dizziness, drowsiness, stomach upset, nausea vomiting or blurred vision.  Patient expresses understanding.    Can take tylenol as needed     - LA  reviewed and appropriate.        - Therapy:  Advised patient continue with activities and exercises as tolerated.      - Imaging:   -reviewed  lumbar xray and previous lumbar MRI   - Lumbar MRI (2021) reviewed; consider updating if limited pain relief from lumbar MBB.        - Consults/Referrals:  None at this time    - Records: Sign records release.  Request imaging, office visit notes, and procedure notes from Dr. Jumana Woo.      - Follow up visit: Will call for % relief day after procedure to determine RFA eligibility     No future appointments.        Visit today included increased complexity associated with the care of the episodic problem of chronic pain which was addressed and continue to manage the longitudinal care of the patient due to the serious and/or complex managed problem(s) listed above.    - Patient Questions: Answered all of the patient's questions regarding diagnosis, therapy, and treatment.    - This condition does not require this patient to take time off of work, and the primary goal of our Pain Management services is to improve the patient's functional capacity.   - I discussed the risks, benefits, and alternatives to potential treatment options. All questions and concerns were fully addressed today in clinic.         Anuja Fam NP  Interventional Pain Management - [facility]    Disclaimer:  This note was prepared using voice recognition system and is likely to have sound alike errors that may have been overlooked even after proof reading.  Please call me with any questions.

## 2024-08-22 ENCOUNTER — OFFICE VISIT (OUTPATIENT)
Dept: PAIN MEDICINE | Facility: CLINIC | Age: 84
End: 2024-08-22
Payer: MEDICARE

## 2024-08-22 VITALS
DIASTOLIC BLOOD PRESSURE: 81 MMHG | BODY MASS INDEX: 22.48 KG/M2 | WEIGHT: 134.94 LBS | RESPIRATION RATE: 17 BRPM | SYSTOLIC BLOOD PRESSURE: 128 MMHG | HEART RATE: 96 BPM | HEIGHT: 65 IN

## 2024-08-22 DIAGNOSIS — M48.061 LUMBAR FORAMINAL STENOSIS: ICD-10-CM

## 2024-08-22 DIAGNOSIS — M47.816 LUMBAR SPONDYLOSIS: Primary | ICD-10-CM

## 2024-08-22 DIAGNOSIS — M51.35 DDD (DEGENERATIVE DISC DISEASE), THORACOLUMBAR: ICD-10-CM

## 2024-08-22 PROCEDURE — 99999 PR PBB SHADOW E&M-EST. PATIENT-LVL IV: CPT | Mod: PBBFAC,,, | Performed by: NURSE PRACTITIONER

## 2024-08-29 NOTE — PRE-PROCEDURE INSTRUCTIONS
Spoke with patient regarding procedure scheduled on 9/5     Arrival time 1215     Has patient been sick with fever or on antibiotics within the last 7 days? No     Does the patient have any open wounds, sores or rashes? No     Does the patient have any recent fractures? no     Has patient received a vaccination within the last 7 days? No     Received the COVID vaccination?      Has the patient stopped all medications as directed? na     Does patient have a pacemaker, defibrillator, or implantable stimulator? No     Does the patient have a ride to and from procedure and someone reliable to remain with patient?      Is the patient diabetic? no     Does the patient have sleep apnea? Or use O2 at home? no     Is the patient receiving sedation?      Is the patient instructed to remain NPO beginning at midnight the night before their procedure? yes     Procedure location confirmed with patient? Yes     Covid- Denies signs/symptoms. Instructed to notify PAT/MD if any changes.

## 2024-09-23 NOTE — PRE-PROCEDURE INSTRUCTIONS
Spoke with patient regarding procedure scheduled on 10.3    Arrival time 0630     Has patient been sick with fever or on antibiotics within the last 7 days? No     Does the patient have any open wounds, sores or rashes? No     Does the patient have any recent fractures? no     Has patient received a vaccination within the last 7 days? No     Received the COVID vaccination? yes     Has the patient stopped all medications as directed? na     Does patient have a pacemaker, defibrillator, or implantable stimulator? No     Does the patient have a ride to and from procedure and someone reliable to remain with patient?        Is the patient diabetic? no     Does the patient have sleep apnea? Or use O2 at home? no     Is the patient receiving sedation? Yes      Is the patient instructed to remain NPO beginning at midnight the night before their procedure? yes     Procedure location confirmed with patient? Yes     Covid- Denies signs/symptoms. Instructed to notify PAT/MD if any changes.

## 2024-09-24 NOTE — TELEPHONE ENCOUNTER
No care due was identified.  [facility] Embedded Care Due Messages. Reference number: [id number].   9/24/2024 6:02:45 PM CDT

## 2024-09-25 RX ORDER — ZOLPIDEM TARTRATE 10 MG/1
TABLET ORAL
Qty: 90 TABLET | OUTPATIENT
Start: 2024-09-25

## 2024-09-25 NOTE — TELEPHONE ENCOUNTER
Refill Routing Note   Medication(s) are not appropriate for processing by [facility] for the following reason(s):        Outside of protocol    [facility] action(s):  Route               Appointments  past 12m or future 3m with PCP    Date Provider   Last Visit   Visit date not found John Allen MD   Next Visit   Visit date not found John Allen MD   ED visits in past 90 days: 0        Note composed:7:25 AM 09/25/2024

## 2024-09-28 ENCOUNTER — PATIENT MESSAGE (OUTPATIENT)
Dept: INTERNAL MEDICINE | Facility: CLINIC | Age: 84
End: 2024-09-28
Payer: MEDICARE

## 2024-09-28 DIAGNOSIS — G43.909 MIGRAINE WITHOUT STATUS MIGRAINOSUS, NOT INTRACTABLE, UNSPECIFIED MIGRAINE TYPE: ICD-10-CM

## 2024-09-30 RX ORDER — SUMATRIPTAN SUCCINATE 100 MG/1
TABLET ORAL
Qty: 27 TABLET | Refills: 0 | Status: ON HOLD | OUTPATIENT
Start: 2024-09-30 | End: 2024-10-03 | Stop reason: HOSPADM

## 2024-09-30 RX ORDER — NAPROXEN SODIUM 220 MG/1
81 TABLET, FILM COATED ORAL DAILY
Qty: 90 TABLET | Refills: 3 | Status: SHIPPED | OUTPATIENT
Start: 2024-09-30

## 2024-09-30 RX ORDER — ZOLPIDEM TARTRATE 10 MG/1
10 TABLET ORAL NIGHTLY PRN
Qty: 90 TABLET | Refills: 0 | OUTPATIENT
Start: 2024-09-30

## 2024-09-30 RX ORDER — OLMESARTAN MEDOXOMIL AND HYDROCHLOROTHIAZIDE 40/12.5 40; 12.5 MG/1; MG/1
1 TABLET ORAL DAILY
Qty: 90 TABLET | Refills: 3 | Status: SHIPPED | OUTPATIENT
Start: 2024-09-30

## 2024-09-30 RX ORDER — SENNOSIDES 8.6 MG/1
1 TABLET ORAL DAILY
Qty: 90 TABLET | Refills: 0 | Status: SHIPPED | OUTPATIENT
Start: 2024-09-30

## 2024-09-30 NOTE — TELEPHONE ENCOUNTER
Refill Routing Note   Medication(s) are not appropriate for processing by [facility] for the following reason(s):        Outside of protocol  No active prescription written by provider    [facility] action(s):  Defer  Route  Approve               Appointments  past 12m or future 3m with PCP    Date Provider   Last Visit   Visit date not found John Allen MD   Next Visit   Visit date not found John Allen MD   ED visits in past 90 days: 0        Note composed:12:26 PM 09/30/2024

## 2024-09-30 NOTE — TELEPHONE ENCOUNTER
No care due was identified.  [facility] Embedded Care Due Messages. Reference number: [id number].   9/30/2024 11:42:41 AM CDT

## 2024-10-03 ENCOUNTER — HOSPITAL ENCOUNTER (OUTPATIENT)
Facility: HOSPITAL | Age: 84
Discharge: HOME | End: 2024-10-03
Attending: PHYSICAL MEDICINE & REHABILITATION | Admitting: PHYSICAL MEDICINE & REHABILITATION
Payer: MEDICARE

## 2024-10-03 VITALS
HEART RATE: 69 BPM | OXYGEN SATURATION: 98 % | DIASTOLIC BLOOD PRESSURE: 85 MMHG | HEIGHT: 65 IN | WEIGHT: 132.38 LBS | BODY MASS INDEX: 22.06 KG/M2 | TEMPERATURE: 98 F | SYSTOLIC BLOOD PRESSURE: 124 MMHG | RESPIRATION RATE: 18 BRPM

## 2024-10-03 DIAGNOSIS — G89.29 CHRONIC THORACIC BACK PAIN, UNSPECIFIED BACK PAIN LATERALITY: ICD-10-CM

## 2024-10-03 DIAGNOSIS — M15.0 PRIMARY OSTEOARTHRITIS INVOLVING MULTIPLE JOINTS: ICD-10-CM

## 2024-10-03 DIAGNOSIS — M54.6 CHRONIC THORACIC BACK PAIN, UNSPECIFIED BACK PAIN LATERALITY: ICD-10-CM

## 2024-10-03 DIAGNOSIS — M47.816 LUMBAR SPONDYLOSIS: Primary | ICD-10-CM

## 2024-10-03 DIAGNOSIS — M79.7 FIBROMYALGIA: ICD-10-CM

## 2024-10-03 PROCEDURE — 64494 INJ PARAVERT F JNT L/S 2 LEV: CPT | Mod: 50,HCNC,, | Performed by: PHYSICAL MEDICINE & REHABILITATION

## 2024-10-03 PROCEDURE — 64493 INJ PARAVERT F JNT L/S 1 LEV: CPT | Mod: 50,HCNC,, | Performed by: PHYSICAL MEDICINE & REHABILITATION

## 2024-10-03 PROCEDURE — 64494 INJ PARAVERT F JNT L/S 2 LEV: CPT | Mod: 50,HCNC | Performed by: PHYSICAL MEDICINE & REHABILITATION

## 2024-10-03 PROCEDURE — 63600175 PHARM REV CODE 636 W HCPCS: Mod: JZ,JG,HCNC | Performed by: PHYSICAL MEDICINE & REHABILITATION

## 2024-10-03 PROCEDURE — 64493 INJ PARAVERT F JNT L/S 1 LEV: CPT | Mod: 50,HCNC | Performed by: PHYSICAL MEDICINE & REHABILITATION

## 2024-10-03 RX ORDER — METHOCARBAMOL 750 MG/1
750 TABLET, FILM COATED ORAL NIGHTLY PRN
Qty: 90 TABLET | Refills: 1 | Status: SHIPPED | OUTPATIENT
Start: 2024-10-03

## 2024-10-03 RX ORDER — FENTANYL CITRATE 50 UG/ML
INJECTION, SOLUTION INTRAMUSCULAR; INTRAVENOUS
Status: DISCONTINUED | OUTPATIENT
Start: 2024-10-03 | End: 2024-10-03 | Stop reason: HOSPADM

## 2024-10-03 RX ORDER — ONDANSETRON HYDROCHLORIDE 2 MG/ML
4 INJECTION, SOLUTION INTRAVENOUS ONCE AS NEEDED
Status: DISCONTINUED | OUTPATIENT
Start: 2024-10-03 | End: 2024-10-03 | Stop reason: HOSPADM

## 2024-10-03 RX ORDER — BUPIVACAINE HYDROCHLORIDE 5 MG/ML
INJECTION, SOLUTION EPIDURAL; INTRACAUDAL
Status: DISCONTINUED | OUTPATIENT
Start: 2024-10-03 | End: 2024-10-03 | Stop reason: HOSPADM

## 2024-10-03 RX ORDER — MIDAZOLAM HYDROCHLORIDE 1 MG/ML
INJECTION INTRAMUSCULAR; INTRAVENOUS
Status: DISCONTINUED | OUTPATIENT
Start: 2024-10-03 | End: 2024-10-03 | Stop reason: HOSPADM

## 2024-10-03 NOTE — DISCHARGE INSTRUCTIONS

## 2024-10-03 NOTE — H&P
HPI  Patient presenting for Procedure(s) (LRB):  Diagnostic Bilateral L3-5 MBB #2 (Bilateral)       No health changes since previous encounter    Past Medical History:   Diagnosis Date    Anemia     Arthritis     left shoulder    Asthma     Chronic bronchitis     Depression     Essential hypertension     GERD (gastroesophageal reflux disease)     History of colon polyps     Due for colonoscopy 2021    Hyperlipidemia     Insomnia     Migraine     Osteoporosis     started on fosamax 10/22    Thyroid disease      Past Surgical History:   Procedure Laterality Date    BACK SURGERY      epidural    BREAST BIOPSY Right over 10 yrs ago    benign    COLONOSCOPY N/A 07/11/2016    Procedure: COLONOSCOPY;  Surgeon: Yordy Penn MD;  Location: [facility] ENDO;  Service: Endoscopy;  Laterality: N/A;    ESOPHAGOGASTRODUODENOSCOPY N/A 02/23/2023    Procedure: EGD (ESOPHAGOGASTRODUODENOSCOPY);  Surgeon: Yao Palmer MD;  Location: [facility] ENDO;  Service: Endoscopy;  Laterality: N/A;    EYE SURGERY      INJECTION OF ANESTHETIC AGENT AROUND MEDIAL BRANCH NERVES INNERVATING LUMBAR FACET JOINT Bilateral 8/6/2024    Procedure: Diagnostic Bilateral L3-5 MBB #1;  Surgeon: Steve Roman MD;  Location: [facility] PAIN T;  Service: Pain Management;  Laterality: Bilateral;    JOINT REPLACEMENT      KNEE ARTHROPLASTY      KNEE ARTHROSCOPY      bilaterly    TONSILLECTOMY      TUBAL LIGATION       Review of patient's allergies indicates:  No Known Allergies     No current facility-administered medications on file prior to encounter.     Current Outpatient Medications on File Prior to Encounter   Medication Sig Dispense Refill    alendronate (FOSAMAX) 70 MG tablet TAKE 1 TABLET EVERY 7 DAYS 12 tablet 3    BUPivacaine (MARCAINE) 0.5 % (5 mg/mL) injection       meloxicam (MOBIC) 7.5 MG tablet Take 1 tablet (7.5 mg total) by mouth once daily. (Patient not taking: Reported on 7/25/2024) 180 tablet 0    methocarbamoL (ROBAXIN) 750 MG Tab Take 1  "tablet (750 mg total) by mouth nightly as needed (pain). 90 tablet 1        PMHx, PSHx, Allergies, Medications reviewed in epic    ROS negative except pain complaints in HPI    OBJECTIVE:    BP (!) 162/81 (BP Location: Right arm, Patient Position: Sitting)   Pulse 75   Temp 97.6 °F (36.4 °C) (Temporal)   Resp 16   Ht 5' 5" (1.651 m)   Wt 60.1 kg (132 lb 6.2 oz)   SpO2 99%   Breastfeeding No   BMI 22.03 kg/m²     PHYSICAL EXAMINATION:    GENERAL: Well appearing, in no acute distress, alert and oriented x3.  PSYCH:  Mood and affect appropriate.  SKIN: Skin color, texture, turgor normal, no rashes or lesions which will impact the procedure.  CV: RRR with palpation of the radial artery.  PULM: No evidence of respiratory difficulty, symmetric chest rise. Clear to auscultation.  NEURO: Cranial nerves grossly intact.    Plan:    Proceed with procedure as planned Procedure(s) (LRB):  Diagnostic Bilateral L3-5 MBB #2 (Bilateral)    Steve Roman MD  10/03/2024            "

## 2024-10-03 NOTE — DISCHARGE SUMMARY
Discharge Note  Short Stay      SUMMARY     Admit Date: 10/3/2024    Attending Physician: Steve Roman MD        Discharge Physician: Steve Roman MD        Discharge Date: 10/3/2024 7:53 AM    Procedure(s) (LRB):  Diagnostic Bilateral L3-5 MBB #2 (Bilateral)    Final Diagnosis: Lumbar spondylosis [M47.816]    Disposition: Home or self care    Patient Instructions:   Current Discharge Medication List        CONTINUE these medications which have NOT CHANGED    Details   aspirin 81 MG Chew Take 1 tablet (81 mg total) by mouth once daily.  Qty: 90 tablet, Refills: 3      olmesartan-hydrochlorothiazide (BENICAR HCT) 40-12.5 mg Tab Take 1 tablet by mouth once daily.  Qty: 90 tablet, Refills: 3    Comments: .      methocarbamoL (ROBAXIN) 750 MG Tab Take 1 tablet (750 mg total) by mouth nightly as needed (pain).  Qty: 90 tablet, Refills: 1    Associated Diagnoses: Primary osteoarthritis involving multiple joints; Chronic thoracic back pain, unspecified back pain laterality; Fibromyalgia      senna (SENOKOT) 8.6 mg tablet Take 1 tablet by mouth once daily.  Qty: 90 tablet, Refills: 0      zolpidem (AMBIEN) 10 mg Tab TAKE 1 TABLET EVERY NIGHT AS NEEDED  Qty: 90 tablet, Refills: 0           STOP taking these medications       alendronate (FOSAMAX) 70 MG tablet Comments:   Reason for Stopping:         BUPivacaine (MARCAINE) 0.5 % (5 mg/mL) injection Comments:   Reason for Stopping:         meloxicam (MOBIC) 7.5 MG tablet Comments:   Reason for Stopping:         sumatriptan (IMITREX) 100 MG tablet Comments:   Reason for Stopping:                   Discharge Diagnosis: Lumbar spondylosis [M47.816]  Condition on Discharge: Stable with no complications to procedure   Diet on Discharge: Same as before.  Activity: as per instruction sheet.  Discharge to: Home with a responsible adult.  Follow up: 2-4 weeks       Please call the office at [phone] if you experience any weakness or loss of sensation, fever > 101.5,  pain uncontrolled with oral medications, persistent nausea/vomiting/or diarrhea, redness or drainage from the incisions, or any other worrisome concerns. If physician on call was not reached or could not communicate with our office for any reason please go to the nearest emergency department

## 2024-10-03 NOTE — OP NOTE
LUMBAR Medial Branch Block (DIAGNOSTIC) Under Fluoroscopy  Time-out taken to identify patient and procedure side prior to starting the procedure.            10/03/2024                                                         Patient has clinical and imaging findings suggestive of facet mediated pain.    For supporting clinical documentation, please see most recent clinic and telephone notes.      PROCEDURE:  Bilateral medial branch block at the transverse processes at the level of L4, L5, Sacral ala    REASON FOR PROCEDURE: Lumbar spondylosis [M47.816]    PHYSICIAN: Steve Roman MD  ASSISTANTS: None    MEDICATIONS INJECTED: 0.5% bupivicane, 1mL at each level    LOCAL ANESTHETIC USED: Xylocaine 1% 10ml    SEDATION MEDICATIONS: Conscious sedation provided by M.D    The patient was monitored with continuous pulse oximetry, EKG, and intermittent blood pressure monitors, immediately prior to administration of sedation.  The patient was hemodynamically stable throughout the entire process was responsive to voice, and breathing spontaneously.  Supplemental O2 was provided at 2L/min via nasal cannula.  Patient was comfortable for the duration of the procedure.     There was a total of 2mg IV Midazolam and 50mcg Fentanyl titrated for the procedure    Total sedation time was >10minutes and <20minutes      ESTIMATED BLOOD LOSS:  None.    COMPLICATIONS:  None.    TECHNIQUE: Laying in a prone position, the patient was prepped and draped in the usual sterile fashion using ChloraPrep and fenestrated drape.  The level was determined under fluoroscopic guidance.  Local anesthetic was given by going down to the hub of the 27-gauge 1.25in needle and raising a wheel.  A 22-gauge 3.5inch needle was introduced to the anatomic local of the medial branch at each of the above levels using fluoroscopy in the AP, oblique, and lateral views.  After negative aspiration, medication was injected slowly. The patient tolerated the procedure  well.       The patient was monitored after the procedure.  Patient was given post procedure and discharge instructions to follow at home.  We will see the patient back in two weeks or the patient may call to inform of status. The patient was discharged in a stable condition

## 2024-10-04 ENCOUNTER — TELEPHONE (OUTPATIENT)
Dept: PAIN MEDICINE | Facility: CLINIC | Age: 84
End: 2024-10-04
Payer: MEDICARE

## 2024-10-09 ENCOUNTER — PATIENT MESSAGE (OUTPATIENT)
Dept: INTERNAL MEDICINE | Facility: CLINIC | Age: 84
End: 2024-10-09
Payer: MEDICARE

## 2024-10-16 ENCOUNTER — PATIENT MESSAGE (OUTPATIENT)
Dept: PAIN MEDICINE | Facility: CLINIC | Age: 84
End: 2024-10-16
Payer: MEDICARE

## 2024-10-17 ENCOUNTER — OFFICE VISIT (OUTPATIENT)
Dept: INTERNAL MEDICINE | Facility: CLINIC | Age: 84
End: 2024-10-17
Payer: MEDICARE

## 2024-10-17 ENCOUNTER — LAB VISIT (OUTPATIENT)
Dept: LAB | Facility: HOSPITAL | Age: 84
End: 2024-10-17
Attending: INTERNAL MEDICINE
Payer: MEDICARE

## 2024-10-17 VITALS
TEMPERATURE: 97 F | WEIGHT: 131.81 LBS | DIASTOLIC BLOOD PRESSURE: 80 MMHG | OXYGEN SATURATION: 99 % | HEART RATE: 103 BPM | BODY MASS INDEX: 21.94 KG/M2 | SYSTOLIC BLOOD PRESSURE: 112 MMHG

## 2024-10-17 DIAGNOSIS — M81.0 OSTEOPOROSIS, UNSPECIFIED OSTEOPOROSIS TYPE, UNSPECIFIED PATHOLOGICAL FRACTURE PRESENCE: ICD-10-CM

## 2024-10-17 DIAGNOSIS — I10 ESSENTIAL HYPERTENSION: Primary | ICD-10-CM

## 2024-10-17 DIAGNOSIS — S06.0X0A CONCUSSION WITHOUT LOSS OF CONSCIOUSNESS, INITIAL ENCOUNTER: ICD-10-CM

## 2024-10-17 DIAGNOSIS — D72.829 LEUKOCYTOSIS, UNSPECIFIED TYPE: ICD-10-CM

## 2024-10-17 DIAGNOSIS — E78.2 MIXED HYPERLIPIDEMIA: ICD-10-CM

## 2024-10-17 PROBLEM — M87.9 OSTEONECROSIS OF MANDIBLE: Status: ACTIVE | Noted: 2024-10-17

## 2024-10-17 LAB
BASOPHILS # BLD AUTO: 0.02 K/UL (ref 0–0.2)
BASOPHILS NFR BLD: 0.2 % (ref 0–1.9)
DIFFERENTIAL METHOD BLD: ABNORMAL
EOSINOPHIL # BLD AUTO: 0.1 K/UL (ref 0–0.5)
EOSINOPHIL NFR BLD: 1.1 % (ref 0–8)
ERYTHROCYTE [DISTWIDTH] IN BLOOD BY AUTOMATED COUNT: 13.9 % (ref 11.5–14.5)
HCT VFR BLD AUTO: 38 % (ref 37–48.5)
HGB BLD-MCNC: 11.5 G/DL (ref 12–16)
IMM GRANULOCYTES # BLD AUTO: 0.04 K/UL (ref 0–0.04)
IMM GRANULOCYTES NFR BLD AUTO: 0.4 % (ref 0–0.5)
LYMPHOCYTES # BLD AUTO: 1.5 K/UL (ref 1–4.8)
LYMPHOCYTES NFR BLD: 15.5 % (ref 18–48)
MCH RBC QN AUTO: 32.9 PG (ref 27–31)
MCHC RBC AUTO-ENTMCNC: 30.3 G/DL (ref 32–36)
MCV RBC AUTO: 109 FL (ref 82–98)
MONOCYTES # BLD AUTO: 1 K/UL (ref 0.3–1)
MONOCYTES NFR BLD: 10.1 % (ref 4–15)
NEUTROPHILS # BLD AUTO: 7.1 K/UL (ref 1.8–7.7)
NEUTROPHILS NFR BLD: 72.7 % (ref 38–73)
NRBC BLD-RTO: 0 /100 WBC
PLATELET # BLD AUTO: 367 K/UL (ref 150–450)
PMV BLD AUTO: 10.9 FL (ref 9.2–12.9)
RBC # BLD AUTO: 3.5 M/UL (ref 4–5.4)
WBC # BLD AUTO: 9.73 K/UL (ref 3.9–12.7)

## 2024-10-17 PROCEDURE — 1126F AMNT PAIN NOTED NONE PRSNT: CPT | Mod: HCNC,CPTII,S$GLB, | Performed by: INTERNAL MEDICINE

## 2024-10-17 PROCEDURE — 3074F SYST BP LT 130 MM HG: CPT | Mod: HCNC,CPTII,S$GLB, | Performed by: INTERNAL MEDICINE

## 2024-10-17 PROCEDURE — 36415 COLL VENOUS BLD VENIPUNCTURE: CPT | Mod: HCNC,PO | Performed by: INTERNAL MEDICINE

## 2024-10-17 PROCEDURE — 99999 PR PBB SHADOW E&M-EST. PATIENT-LVL IV: CPT | Mod: PBBFAC,HCNC,, | Performed by: INTERNAL MEDICINE

## 2024-10-17 PROCEDURE — 1160F RVW MEDS BY RX/DR IN RCRD: CPT | Mod: HCNC,CPTII,S$GLB, | Performed by: INTERNAL MEDICINE

## 2024-10-17 PROCEDURE — 99214 OFFICE O/P EST MOD 30 MIN: CPT | Mod: HCNC,S$GLB,, | Performed by: INTERNAL MEDICINE

## 2024-10-17 PROCEDURE — 1159F MED LIST DOCD IN RCRD: CPT | Mod: HCNC,CPTII,S$GLB, | Performed by: INTERNAL MEDICINE

## 2024-10-17 PROCEDURE — 3079F DIAST BP 80-89 MM HG: CPT | Mod: HCNC,CPTII,S$GLB, | Performed by: INTERNAL MEDICINE

## 2024-10-17 PROCEDURE — 1101F PT FALLS ASSESS-DOCD LE1/YR: CPT | Mod: HCNC,CPTII,S$GLB, | Performed by: INTERNAL MEDICINE

## 2024-10-17 PROCEDURE — 85025 COMPLETE CBC W/AUTO DIFF WBC: CPT | Mod: HCNC | Performed by: INTERNAL MEDICINE

## 2024-10-17 PROCEDURE — 3288F FALL RISK ASSESSMENT DOCD: CPT | Mod: HCNC,CPTII,S$GLB, | Performed by: INTERNAL MEDICINE

## 2024-10-17 NOTE — PROGRESS NOTES
Subjective:       Patient ID: Migdalia Luna is a 84 y.o. female.    Chief Complaint: No chief complaint on file.      HPI:  History of Present Illness    Patient presents today for follow up on blood test results and reports a recent head injury.    She reports falling and hitting her head on bathroom tile two weeks ago after tripping over a suitcase. She experienced significant nasal bleeding following the impact. Since then, she has been experiencing persistent headaches and visual disturbances described as zigzag lines in her vision. She denies loss of consciousness, feeling faint, or vomiting during or after the incident. She also reports difficulty blowing her nose since the injury.    She received a message about her February blood test results showing an elevated white blood cell count. She was prescribed an antibiotic for a possible infection but acknowledges not completing the follow-up lab work as scheduled.    She reports taking Benicar for blood pressure management and Ambien, which was recently refilled. She expresses difficulty recalling her current medications. She has been on Fosamax or a similar medication since age 64 for bone health.    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 She is scheduled for follow-up on January 3rd to evaluate her jawbone condition.    She has a mammogram scheduled for the 24th. She mentions completing a home test for colon cancer screening previously and recalls a history of polyps found during a past colonoscopy.         Review of Systems   Constitutional:  Positive for activity change. Negative for unexpected weight change.   HENT:  Negative for hearing loss, rhinorrhea and trouble swallowing.    Eyes:  Negative for discharge and visual disturbance.   Respiratory:  Negative for chest tightness and wheezing.    Cardiovascular:  Negative for chest pain.   Gastrointestinal:   Positive for constipation. Negative for blood in stool, diarrhea and vomiting.   Endocrine: Negative for polydipsia and polyuria.   Genitourinary:  Negative for difficulty urinating, dysuria, hematuria and menstrual problem.   Musculoskeletal:  Positive for arthralgias and joint swelling. Negative for neck pain.   Neurological:  Positive for weakness and headaches.   Psychiatric/Behavioral:  Positive for dysphoric mood. Negative for confusion.        Objective:   /80   Pulse 103   Temp 97.2 °F (36.2 °C) (Tympanic)   Wt 59.8 kg (131 lb 13.4 oz)   SpO2 99%   BMI 21.94 kg/m²      Physical Exam  Vitals reviewed.   Constitutional:       Appearance: Normal appearance. She is well-developed. She is not ill-appearing.   HENT:      Head: Normocephalic and atraumatic.      Right Ear: Tympanic membrane, ear canal and external ear normal.      Left Ear: Tympanic membrane, ear canal and external ear normal.      Nose:      Comments: Tenderness to palpation of the nose but no distortion of architecture.  Eyes:      Pupils: Pupils are equal, round, and reactive to light.   Neck:      Thyroid: No thyromegaly.   Cardiovascular:      Rate and Rhythm: Normal rate and regular rhythm.      Heart sounds: No murmur heard.     No friction rub. No gallop.   Pulmonary:      Effort: Pulmonary effort is normal.      Breath sounds: Normal breath sounds. No wheezing or rales.   Chest:      Chest wall: No tenderness.   Abdominal:      General: Abdomen is flat. Bowel sounds are normal. There is no distension.      Palpations: Abdomen is soft. There is no mass.      Tenderness: There is no abdominal tenderness. There is no guarding or rebound.   Musculoskeletal:      Cervical back: Normal range of motion and neck supple.   Lymphadenopathy:      Cervical: No cervical adenopathy.   Skin:     General: Skin is warm and dry.      Findings: No rash.   Neurological:      General: No focal deficit present.      Mental Status: She is alert and  oriented to person, place, and time.      Cranial Nerves: No cranial nerve deficit.      Deep Tendon Reflexes: Reflexes are normal and symmetric. Reflexes normal.   Psychiatric:         Behavior: Behavior normal.         Judgment: Judgment normal.             Assessment:       1. Essential hypertension    2. Mixed hyperlipidemia    3. Concussion without loss of consciousness, initial encounter    4. Leukocytosis, unspecified type    5. Osteoporosis, unspecified osteoporosis type, unspecified pathological fracture presence        Plan:   No problem-specific Assessment & Plan notes found for this encounter.    Diagnoses and all orders for this visit:    Essential hypertension    Mixed hyperlipidemia    Concussion without loss of consciousness, initial encounter  -     CT Head Without Contrast; Future    Leukocytosis, unspecified type  -     CBC Auto Differential; Future    Osteoporosis, unspecified osteoporosis type, unspecified pathological fracture presence  -     DXA Bone Density Axial Skeleton 1 or more sites; Future      Assessment & Plan    CONCUSSION:  - Assessed patient's head injury from fall 2 weeks ago as likely concussion based on reported symptoms of persistent headache and visual disturbances.  - Explained concussion mechanism: brain floating inside skull can result in bruising from impact and counter-coup injury.  - Described common concussion symptoms: headache, dizziness, nausea, light/sound sensitivity, visual disturbances.  - Discussed concussion recovery: staying hydrated, resting, avoiding overstimulation (e.g., excessive screen time).  - Explained that concussion symptoms typically resolve within a few weeks to a month.  - Patient to rest and avoid overstimulating the brain (e.g., limit time on tablet, laptop, or watching TV).  - Patient to stay well hydrated.  - Recommend Tylenol over BC powders for headache management.  - Determined need for CT of head to rule out more serious intracranial  pathology.  - Ordered CT of head to evaluate for intracranial pathology.    ELEVATED WHITE BLOOD CELL COUNT:  - Reviewed previous lab work from February showing elevated white blood cell count, which was treated with antibiotics at that time.  - Will recheck white blood cell count to ensure resolution of previous elevation.  - Ordered lab work to recheck white blood cell count.    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  - Ordered bone density scan (DEXA scan).  - Follow up to schedule bone density scan at [facility].    HYPERTENSION:  - Continued Benicar for blood pressure management.         Follow up if symptoms worsen or fail to improve.

## 2024-10-18 ENCOUNTER — TELEPHONE (OUTPATIENT)
Dept: INTERNAL MEDICINE | Facility: CLINIC | Age: 84
End: 2024-10-18
Payer: MEDICARE

## 2024-10-21 ENCOUNTER — APPOINTMENT (OUTPATIENT)
Dept: RADIOLOGY | Facility: HOSPITAL | Age: 84
End: 2024-10-21
Attending: INTERNAL MEDICINE
Payer: MEDICARE

## 2024-10-21 DIAGNOSIS — M81.0 OSTEOPOROSIS, UNSPECIFIED OSTEOPOROSIS TYPE, UNSPECIFIED PATHOLOGICAL FRACTURE PRESENCE: ICD-10-CM

## 2024-10-21 PROCEDURE — 77080 DXA BONE DENSITY AXIAL: CPT | Mod: 26,HCNC,, | Performed by: RADIOLOGY

## 2024-10-21 PROCEDURE — 77080 DXA BONE DENSITY AXIAL: CPT | Mod: TC,HCNC

## 2024-10-22 ENCOUNTER — HOSPITAL ENCOUNTER (OUTPATIENT)
Dept: RADIOLOGY | Facility: HOSPITAL | Age: 84
Discharge: HOME | End: 2024-10-22
Attending: INTERNAL MEDICINE
Payer: MEDICARE

## 2024-10-22 ENCOUNTER — TELEPHONE (OUTPATIENT)
Dept: INTERNAL MEDICINE | Facility: CLINIC | Age: 84
End: 2024-10-22
Payer: MEDICARE

## 2024-10-22 ENCOUNTER — TELEPHONE (OUTPATIENT)
Dept: PAIN MEDICINE | Facility: CLINIC | Age: 84
End: 2024-10-22
Payer: MEDICARE

## 2024-10-22 DIAGNOSIS — M81.0 AGE-RELATED OSTEOPOROSIS WITHOUT CURRENT PATHOLOGICAL FRACTURE: Primary | ICD-10-CM

## 2024-10-22 DIAGNOSIS — M87.9 OSTEONECROSIS: ICD-10-CM

## 2024-10-22 DIAGNOSIS — S06.0X0A CONCUSSION WITHOUT LOSS OF CONSCIOUSNESS, INITIAL ENCOUNTER: ICD-10-CM

## 2024-10-22 PROCEDURE — 70450 CT HEAD/BRAIN W/O DYE: CPT | Mod: TC,HCNC

## 2024-10-22 PROCEDURE — 70450 CT HEAD/BRAIN W/O DYE: CPT | Mod: 26,HCNC,, | Performed by: STUDENT IN AN ORGANIZED HEALTH CARE EDUCATION/TRAINING PROGRAM

## 2024-10-22 NOTE — TELEPHONE ENCOUNTER
----- Message from Melissa sent at 10/22/2024  2:40 PM CDT -----  Contact: Migdalia Ngeron missed call regards to some follow up question about her last injection and would like another call back at [phone].    Thanks  td

## 2024-10-22 NOTE — TELEPHONE ENCOUNTER
Reached out to pt to get their percent relief from the injection that they had and to answer the following questions.          1. What percentage of pain relief did you receive following the block, from 0-100%?     80%    2. What was pain score the day before your procedure on a scale from 0-10?    8/10    3. What was pain score immediately after your procedure (up to 6 hours)  on a scale from 0-10?    2/10    4. When your pain returned, what was your pain score on a scale from 0-10?    8/10     5. How many hours did pain relief last following the block?      6 hours     6. During this time, please describe in detail the activities you were able to do?    Pt rest     Pain Disability Index (PDI) Score Review:      8/22/2024    10:40 AM 7/25/2024    10:19 AM 5/2/2024     2:47 PM   Last 3 PDI Scores   Pain Disability Index (PDI) 50 [phone]          Thank you,  Bull Gant   Medical Assistant

## 2024-10-24 ENCOUNTER — HOSPITAL ENCOUNTER (OUTPATIENT)
Dept: RADIOLOGY | Facility: HOSPITAL | Age: 84
Discharge: HOME | End: 2024-10-24
Attending: INTERNAL MEDICINE
Payer: MEDICARE

## 2024-10-24 DIAGNOSIS — Z12.31 ENCOUNTER FOR SCREENING MAMMOGRAM FOR BREAST CANCER: ICD-10-CM

## 2024-10-24 PROCEDURE — 77063 BREAST TOMOSYNTHESIS BI: CPT | Mod: 26,HCNC,, | Performed by: STUDENT IN AN ORGANIZED HEALTH CARE EDUCATION/TRAINING PROGRAM

## 2024-10-24 PROCEDURE — 77067 SCR MAMMO BI INCL CAD: CPT | Mod: TC,HCNC,PO

## 2024-10-24 PROCEDURE — 77067 SCR MAMMO BI INCL CAD: CPT | Mod: 26,HCNC,, | Performed by: STUDENT IN AN ORGANIZED HEALTH CARE EDUCATION/TRAINING PROGRAM

## 2024-10-24 NOTE — PROGRESS NOTES
Your mammogram is normal.  Repeat screening is recommended in one year.    Sincerely,    John Allen M.D.        If you would like to review your experience with Dr. Allen or [facility], please follow the link below:    [email or web address]

## 2024-10-25 NOTE — PRE-PROCEDURE INSTRUCTIONS
Spoke with patient regarding procedure scheduled on 11.5     Arrival time 1000     Has patient been sick with fever or on antibiotics within the last 7 days? No     Does the patient have any open wounds, sores or rashes? No     Does the patient have any recent fractures? no     Has patient received a vaccination within the last 7 days? No     Received the COVID vaccination? yes     Has the patient stopped all medications as directed? na     Does patient have a pacemaker, defibrillator, or implantable stimulator? No     Does the patient have a ride to and from procedure and someone reliable to remain with patient?        Is the patient diabetic? no     Does the patient have sleep apnea? Or use O2 at home? no     Is the patient receiving sedation? yes     Is the patient instructed to remain NPO beginning at midnight the night before their procedure? yes     Procedure location confirmed with patient? Yes     Covid- Denies signs/symptoms. Instructed to notify PAT/MD if any changes.

## 2024-11-05 ENCOUNTER — HOSPITAL ENCOUNTER (OUTPATIENT)
Facility: HOSPITAL | Age: 84
Discharge: HOME | End: 2024-11-05
Attending: PHYSICAL MEDICINE & REHABILITATION | Admitting: PHYSICAL MEDICINE & REHABILITATION
Payer: MEDICARE

## 2024-11-05 VITALS
HEART RATE: 72 BPM | BODY MASS INDEX: 21.93 KG/M2 | DIASTOLIC BLOOD PRESSURE: 99 MMHG | RESPIRATION RATE: 15 BRPM | OXYGEN SATURATION: 97 % | SYSTOLIC BLOOD PRESSURE: 172 MMHG | HEIGHT: 65 IN | WEIGHT: 131.63 LBS | TEMPERATURE: 98 F

## 2024-11-05 DIAGNOSIS — M47.816 LUMBAR SPONDYLOSIS: Primary | ICD-10-CM

## 2024-11-05 PROCEDURE — 64635 DESTROY LUMB/SAC FACET JNT: CPT | Mod: 50,HCNC | Performed by: PHYSICAL MEDICINE & REHABILITATION

## 2024-11-05 PROCEDURE — 63600175 PHARM REV CODE 636 W HCPCS: Mod: HCNC | Performed by: PHYSICAL MEDICINE & REHABILITATION

## 2024-11-05 PROCEDURE — 64636 DESTROY L/S FACET JNT ADDL: CPT | Mod: 50,HCNC | Performed by: PHYSICAL MEDICINE & REHABILITATION

## 2024-11-05 PROCEDURE — 64636 DESTROY L/S FACET JNT ADDL: CPT | Mod: 50,HCNC,, | Performed by: PHYSICAL MEDICINE & REHABILITATION

## 2024-11-05 PROCEDURE — 64635 DESTROY LUMB/SAC FACET JNT: CPT | Mod: 50,HCNC,, | Performed by: PHYSICAL MEDICINE & REHABILITATION

## 2024-11-05 PROCEDURE — 99152 MOD SED SAME PHYS/QHP 5/>YRS: CPT | Mod: HCNC | Performed by: PHYSICAL MEDICINE & REHABILITATION

## 2024-11-05 RX ORDER — MIDAZOLAM HYDROCHLORIDE 1 MG/ML
INJECTION, SOLUTION INTRAMUSCULAR; INTRAVENOUS
Status: DISCONTINUED | OUTPATIENT
Start: 2024-11-05 | End: 2024-11-05 | Stop reason: HOSPADM

## 2024-11-05 RX ORDER — FENTANYL CITRATE 50 UG/ML
INJECTION, SOLUTION INTRAMUSCULAR; INTRAVENOUS
Status: DISCONTINUED | OUTPATIENT
Start: 2024-11-05 | End: 2024-11-05 | Stop reason: HOSPADM

## 2024-11-05 RX ORDER — BUPIVACAINE HYDROCHLORIDE 2.5 MG/ML
INJECTION, SOLUTION EPIDURAL; INFILTRATION; INTRACAUDAL
Status: DISCONTINUED | OUTPATIENT
Start: 2024-11-05 | End: 2024-11-05 | Stop reason: HOSPADM

## 2024-11-05 RX ORDER — ONDANSETRON HYDROCHLORIDE 2 MG/ML
4 INJECTION, SOLUTION INTRAVENOUS ONCE AS NEEDED
Status: DISCONTINUED | OUTPATIENT
Start: 2024-11-05 | End: 2024-11-05 | Stop reason: HOSPADM

## 2024-11-05 NOTE — DISCHARGE INSTRUCTIONS

## 2024-11-05 NOTE — DISCHARGE SUMMARY
Discharge Note  Short Stay      SUMMARY     Admit Date: 11/5/2024    Attending Physician: Steve Roman MD        Discharge Physician: Steve Roman MD        Discharge Date: 11/5/2024 11:59 AM    Procedure(s) (LRB):  Bilateral L3-5 RFA (Bilateral)    Final Diagnosis: Lumbar spondylosis [M47.816]    Disposition: Home or self care    Patient Instructions:   Current Discharge Medication List        CONTINUE these medications which have NOT CHANGED    Details   aspirin 81 MG Chew Take 1 tablet (81 mg total) by mouth once daily.  Qty: 90 tablet, Refills: 3      methocarbamoL (ROBAXIN) 750 MG Tab Take 1 tablet (750 mg total) by mouth nightly as needed (pain).  Qty: 90 tablet, Refills: 1    Associated Diagnoses: Primary osteoarthritis involving multiple joints; Chronic thoracic back pain, unspecified back pain laterality; Fibromyalgia      olmesartan-hydrochlorothiazide (BENICAR HCT) 40-12.5 mg Tab Take 1 tablet by mouth once daily.  Qty: 90 tablet, Refills: 3    Comments: .      zolpidem (AMBIEN) 10 mg Tab TAKE 1 TABLET EVERY NIGHT AS NEEDED  Qty: 90 tablet, Refills: 0      senna (SENOKOT) 8.6 mg tablet Take 1 tablet by mouth once daily.  Qty: 90 tablet, Refills: 0                 Discharge Diagnosis: Lumbar spondylosis [M47.816]  Condition on Discharge: Stable with no complications to procedure   Diet on Discharge: Same as before.  Activity: as per instruction sheet.  Discharge to: Home with a responsible adult.  Follow up: 2-4 weeks       Please call the office at [phone] if you experience any weakness or loss of sensation, fever > 101.5, pain uncontrolled with oral medications, persistent nausea/vomiting/or diarrhea, redness or drainage from the incisions, or any other worrisome concerns. If physician on call was not reached or could not communicate with our office for any reason please go to the nearest emergency department

## 2024-11-05 NOTE — OP NOTE
Lumbar Medial nerve branch block radiofrequency ablation Under Fluoroscopy     Time-out taken to identify patient and procedure side prior to starting the procedure.     11/05/2024    Patient has clinical and imaging findings suggestive of facet mediated pain and has completed 2 diagnostic medial branch blocks at specified levels with at least 80% relief for the expected duration of the local anesthetic utilized.    For supporting clinical documentation, please see most recent clinic and telephone notes.     PROCEDURE: Bilateral radiofrequency ablation of the the medial branch nerves at the   transverse process of  L4, L5 and sacral ala    2)Conscious sedation provided by MD     REASON FOR PROCEDURE: Lumbar spondylosis [M47.816]     PHYSICIAN: Steve Roman MD    ASSISTANTS: None     SEDATION: Conscious sedation provided by M.D    The patient was monitored with continuous pulse oximetry, EKG, and intermittent blood pressure monitors, immediately prior to administration of sedation.  The patient was hemodynamically stable throughout the entire process was responsive to voice, and breathing spontaneously.  Supplemental O2 was provided at 2L/min via nasal cannula.  Patient was comfortable for the duration of the procedure.     There was a total of 2mg IV Midazolam and 100mcg Fentanyl titrated for the procedure    Total sedation time was >10minutes and <20minutes      MEDICATIONS INJECTED: 0.25% Bupivicaine total  10 mL     LOCAL ANESTHETIC USED: Xylocaine 1% 1mL / site     ESTIMATED BLOOD LOSS: None.     COMPLICATIONS: None.     Interval history: Patient reports that he had complete relief of pain for the day of the procedure, we will proceed with the RFA     TECHNIQUE: Laying in a prone position, the patient was prepped and draped in the usual sterile fashion using ChloraPrep and fenestrated drape. The level was determined under fluoroscopic guidance. Local anesthetic was given by going down to the hub of the  27-gauge 1.25in needle and raising a wheel. A 18-gauge 10mm curved active tip needle was introduced to the anatomic local of the medial branch at each of the above levels using fluoroscopy. Then sensory and motor testing was performed to confirm that the needle tips were in the correct location. Then after negative aspiration, 1 mL of 0.25% bupivacaine was injected into each level. This was followed by thermal lesioning at 80 degrees celsius for 90 seconds.       The patient tolerated the procedure well. Did not have any procedure related motor deficit at the conclusion of the procedure    The patient was monitored after the procedure. Patient was given post procedure and discharge instructions to follow at home. We will see the patient back in two weeks or the patient may call to inform of status. The patient was discharged in a stable condition

## 2024-11-05 NOTE — H&P
HPI  Patient presenting for Procedure(s) (LRB):  Bilateral L3-5 RFA (Bilateral)       No health changes since previous encounter    Past Medical History:   Diagnosis Date    Anemia     Arthritis     left shoulder    Asthma     Chronic bronchitis     Depression     Essential hypertension     GERD (gastroesophageal reflux disease)     History of colon polyps     Due for colonoscopy 2021    Hyperlipidemia     Insomnia     Migraine     Osteoporosis     started on fosamax 10/22    Thyroid disease      Past Surgical History:   Procedure Laterality Date    BACK SURGERY      epidural    BREAST BIOPSY Right over 10 yrs ago    benign    COLONOSCOPY N/A 07/11/2016    Procedure: COLONOSCOPY;  Surgeon: Yordy Penn MD;  Location: [facility] ENDO;  Service: Endoscopy;  Laterality: N/A;    ESOPHAGOGASTRODUODENOSCOPY N/A 02/23/2023    Procedure: EGD (ESOPHAGOGASTRODUODENOSCOPY);  Surgeon: Yao Palmer MD;  Location: [facility] ENDO;  Service: Endoscopy;  Laterality: N/A;    EYE SURGERY      INJECTION OF ANESTHETIC AGENT AROUND MEDIAL BRANCH NERVES INNERVATING LUMBAR FACET JOINT Bilateral 8/6/2024    Procedure: Diagnostic Bilateral L3-5 MBB #1;  Surgeon: Steve Roman MD;  Location: [facility] PAIN MGT;  Service: Pain Management;  Laterality: Bilateral;    INJECTION OF ANESTHETIC AGENT AROUND MEDIAL BRANCH NERVES INNERVATING LUMBAR FACET JOINT Bilateral 10/3/2024    Procedure: Diagnostic Bilateral L3-5 MBB #2;  Surgeon: Steve Roman MD;  Location: [facility] PAIN MGT;  Service: Pain Management;  Laterality: Bilateral;    JOINT REPLACEMENT      KNEE ARTHROPLASTY      KNEE ARTHROSCOPY      bilaterly    TONSILLECTOMY      TUBAL LIGATION       Review of patient's allergies indicates:  No Known Allergies     No current facility-administered medications on file prior to encounter.     No current outpatient medications on file prior to encounter.        PMHx, PSHx, Allergies, Medications reviewed in epic    ROS negative except pain  "complaints in HPI    OBJECTIVE:    BP (!) 171/92 (BP Location: Right arm, Patient Position: Sitting)   Pulse 68   Temp 97.7 °F (36.5 °C) (Temporal)   Resp 14   Ht 5' 5" (1.651 m)   Wt 59.7 kg (131 lb 9.8 oz)   SpO2 97%   Breastfeeding No   BMI 21.90 kg/m²     PHYSICAL EXAMINATION:    GENERAL: Well appearing, in no acute distress, alert and oriented x3.  PSYCH:  Mood and affect appropriate.  SKIN: Skin color, texture, turgor normal, no rashes or lesions which will impact the procedure.  CV: RRR with palpation of the radial artery.  PULM: No evidence of respiratory difficulty, symmetric chest rise. Clear to auscultation.  NEURO: Cranial nerves grossly intact.    Plan:    Proceed with procedure as planned Procedure(s) (LRB):  Bilateral L3-5 RFA (Bilateral)    Steve Roman MD  11/05/2024            "

## 2024-11-20 DIAGNOSIS — G47.00 INSOMNIA, UNSPECIFIED TYPE: Primary | ICD-10-CM

## 2024-11-20 RX ORDER — ZOLPIDEM TARTRATE 10 MG/1
TABLET ORAL
Qty: 90 TABLET | Refills: 0 | Status: SHIPPED | OUTPATIENT
Start: 2024-11-20

## 2024-11-20 NOTE — TELEPHONE ENCOUNTER
No care due was identified.  [facility] Embedded Care Due Messages. Reference number: [id number].   11/20/2024 9:50:15 AM CST

## 2024-11-20 NOTE — TELEPHONE ENCOUNTER
Refill Routing Note   Medication(s) are not appropriate for processing by [facility] for the following reason(s):        Outside of protocol    [facility] action(s):  Route             Appointments  past 12m or future 3m with PCP    Date Provider   Last Visit   10/17/2024 John Allen MD   Next Visit   Visit date not found John Allen MD   ED visits in past 90 days: 0        Note composed:10:33 AM 11/20/2024

## 2025-01-08 NOTE — PROGRESS NOTES
January 8, 2025     Patient: Karen Short  YOB: 2015  Date of Visit: 1/8/2025      To Whom it May Concern:    Karen Short is under my professional care. Karen was seen in my office on 1/8/2025. Karen may return to school on 1/9/2025 .    If you have any questions or concerns, please don't hesitate to call.         Sincerely,          ARTURO Hannah        CC: No Recipients   "HPI:  Patient is a 79-year-old female who comes in today for follow-up of her hypertension lipids in for her annual physical exam.  Patient is been doing very well.  She has no significant problems or complaints at this time.  She still plays tennis 4-5 days a week.    Current MEDS: medcard review, verified and update  Allergies: Per the electronic medical record    Past Medical History:   Diagnosis Date    Anemia     Arthritis     left shoulder    Asthma     Chronic bronchitis     Depression     Essential hypertension     GERD (gastroesophageal reflux disease)     History of colon polyps     Due for colonoscopy 2021    Hyperlipidemia     Insomnia     Migraine     Thyroid disease        Past Surgical History:   Procedure Laterality Date    BACK SURGERY      epidural    BREAST BIOPSY Right over 10 yrs ago    benign    COLONOSCOPY N/A 7/11/2016    Procedure: COLONOSCOPY;  Surgeon: Yordy Penn MD;  Location: [facility];  Service: Endoscopy;  Laterality: N/A;    KNEE ARTHROPLASTY      KNEE ARTHROSCOPY      bilaterly    TONSILLECTOMY         SHx: per the electronic medical record    FHx: recorded in the electronic medical record    ROS:    denies any chest pains or shortness of breath. Denies any nausea, vomiting or diarrhea. Denies any fever, chills or sweats. Denies any change in weight, voice, stool, skin or hair. Denies any dysuria, dyspepsia or dysphagia. Denies any change in vision, hearing or headaches. Denies any swollen lymph nodes or loss of memory.    PE:  /74   Pulse 98   Temp 98.6 °F (37 °C) (Tympanic)   Ht 5' 5.5" (1.664 m)   Wt 67.7 kg (149 lb 4 oz)   SpO2 99%   BMI 24.46 kg/m²   Gen: Well-developed, well-nourished, female, in no acute distress, oriented x3  HEENT: neck is supple, no adenopathy, carotids 2+ equal without bruits, thyroid exam normal size without nodules.  CHEST: clear to auscultation and percussion  CVS: regular rate and rhythm without significant " murmur, gallop, or rubs  ABD: soft, benign, no rebound no guarding, no distention. Bowel sounds are normal.     Nontender,  no palpable masses, no organomegaly and no audible bruits.  BREAST: no masses.  No nipple inversion, retraction, or deviation.  EXT: no clubbing, cyanosis, or edema  LYMPH: no cervical, inguinal, or axillary adenopathy  FEET: no loss of sensation.  No ulcers or pressure sores.  NEURO: gait normal.  Cranial nerves II- XII intact. No nystagmus.  Speech normal.   Gross motor and sensory unremarkable.  PELVIC: deferred    Lab Results   Component Value Date    WBC 6.43 01/15/2020    HGB 12.0 01/15/2020    HCT 40.0 01/15/2020     (H) 01/15/2020    CHOL 187 01/15/2020    TRIG 100 01/15/2020    HDL 64 01/15/2020    ALT 16 01/15/2020    AST 22 01/15/2020     01/15/2020    K 3.8 01/15/2020     01/15/2020    CREATININE 0.8 01/15/2020    BUN 13 01/15/2020    CO2 26 01/15/2020    TSH 3.198 01/15/2020    INR 1.0 06/09/2011       Impression:  Stable medical problems below  Patient Active Problem List   Diagnosis    GERD (gastroesophageal reflux disease)    Hyperlipidemia    Depression    Insomnia    Migraine    Essential hypertension    History of colon polyps       Plan:   Orders Placed This Encounter    Mammo Digital Screening Bilat    DXA Bone Density Spine And Hip     Her medications remain the same.  She will be seen again in September with mammogram and DEXA scan.    This note is generated with speech recognition software and is subject to transcription error and sound alike phrases that may be missed by proofreading.

## 2025-01-16 ENCOUNTER — OFFICE VISIT (OUTPATIENT)
Dept: PAIN MEDICINE | Facility: CLINIC | Age: 85
End: 2025-01-16
Payer: MEDICARE

## 2025-01-16 VITALS
HEIGHT: 65 IN | HEART RATE: 92 BPM | BODY MASS INDEX: 21.67 KG/M2 | RESPIRATION RATE: 17 BRPM | WEIGHT: 130.06 LBS | DIASTOLIC BLOOD PRESSURE: 79 MMHG | SYSTOLIC BLOOD PRESSURE: 116 MMHG

## 2025-01-16 DIAGNOSIS — M47.816 LUMBAR SPONDYLOSIS: Primary | ICD-10-CM

## 2025-01-16 DIAGNOSIS — M48.061 DEGENERATIVE LUMBAR SPINAL STENOSIS: ICD-10-CM

## 2025-01-16 DIAGNOSIS — M79.18 LUMBAR MUSCLE PAIN: ICD-10-CM

## 2025-01-16 DIAGNOSIS — M54.9 DORSALGIA, UNSPECIFIED: ICD-10-CM

## 2025-01-16 DIAGNOSIS — M54.6 CHRONIC THORACIC BACK PAIN, UNSPECIFIED BACK PAIN LATERALITY: ICD-10-CM

## 2025-01-16 DIAGNOSIS — G89.29 CHRONIC THORACIC BACK PAIN, UNSPECIFIED BACK PAIN LATERALITY: ICD-10-CM

## 2025-01-16 DIAGNOSIS — M15.0 PRIMARY OSTEOARTHRITIS INVOLVING MULTIPLE JOINTS: ICD-10-CM

## 2025-01-16 PROCEDURE — 99999 PR PBB SHADOW E&M-EST. PATIENT-LVL IV: CPT | Mod: PBBFAC,HCNC,, | Performed by: PHYSICIAN ASSISTANT

## 2025-01-16 PROCEDURE — 3078F DIAST BP <80 MM HG: CPT | Mod: CPTII,S$GLB,, | Performed by: PHYSICIAN ASSISTANT

## 2025-01-16 PROCEDURE — 1160F RVW MEDS BY RX/DR IN RCRD: CPT | Mod: CPTII,S$GLB,, | Performed by: PHYSICIAN ASSISTANT

## 2025-01-16 PROCEDURE — 3074F SYST BP LT 130 MM HG: CPT | Mod: CPTII,S$GLB,, | Performed by: PHYSICIAN ASSISTANT

## 2025-01-16 PROCEDURE — 1125F AMNT PAIN NOTED PAIN PRSNT: CPT | Mod: CPTII,S$GLB,, | Performed by: PHYSICIAN ASSISTANT

## 2025-01-16 PROCEDURE — 1101F PT FALLS ASSESS-DOCD LE1/YR: CPT | Mod: CPTII,S$GLB,, | Performed by: PHYSICIAN ASSISTANT

## 2025-01-16 PROCEDURE — 3288F FALL RISK ASSESSMENT DOCD: CPT | Mod: CPTII,S$GLB,, | Performed by: PHYSICIAN ASSISTANT

## 2025-01-16 PROCEDURE — 99215 OFFICE O/P EST HI 40 MIN: CPT | Mod: S$GLB,,, | Performed by: PHYSICIAN ASSISTANT

## 2025-01-16 PROCEDURE — 1159F MED LIST DOCD IN RCRD: CPT | Mod: CPTII,S$GLB,, | Performed by: PHYSICIAN ASSISTANT

## 2025-01-16 RX ORDER — METHOCARBAMOL 750 MG/1
750 TABLET, FILM COATED ORAL NIGHTLY PRN
Qty: 90 TABLET | Refills: 1 | Status: SHIPPED | OUTPATIENT
Start: 2025-01-16

## 2025-01-16 NOTE — PROGRESS NOTES
Chronic Pain -- Established Patient (Follow-up visit)    Referring Physician: John Allen MD    PCP: John Allen MD     Chief Complaint:   Chief Complaint   Patient presents with    Follow-up     Lumbar discogenic pain (+shopping cart sign), no radicular leg pain       SUBJECTIVE:    Interval History (1/16/2025): Migdalia Luna presents today for follow-up visit.  S/p diagnostic bilateral L3-5 MBB on 08/14/2024 and 10/22/2024 with 80% pain relief.  she ultimately underwent bilateral L3-5 RFA on 11/5/24 (about 10 weeks ago).  The patient reports that she is/was unchanged following the procedure. She feels that she was coaxed into completing the 2nd MBB and ultimately the RFA. She has found no benefit from these procedures and feels at a loss at this point.  Pain is worse with walking.  She does feel that she gets some pain relief when bending forward.    Patient reports pain as 10/10 today.    Interval History (8/22/2024):  Patient presents today for follow-up visit.  Patient was last seen on  8/6/2024 bilateral L3-5 MBB with 80% relief x 1 day. She is here today because of a lack of continued pain relief and did not realize that she was not to be expected to have long term pain relief form the block procedure. She continues to have axial low back pain without radiculopathy.  Patient denies night fever/night sweats, urinary incontinence, bowel incontinence, significant weight loss and significant motor weakness.   Patient denies any other complaints or concerns at this time.     Interval History (7/25/2024):  Patient presents today for follow-up visit.  Patient was last seen almost 3 months ago. At that visit, she had Thoracic paraspinals, middle trapezius, lumbar paraspinals, and right gluteal region TPI with limited pain relief.  Patient reports pain as 5/10 today with medication.  Today, she mostly complains of low back pain, with no radicular pain.  She reports that she has had an epidural in the  past with Dr. Jumana Woo with good pain relief.  She last had a lumbar MRI in 2021.  She reports that she was very active up until July of 2023 where she was playing tennis.  Now, the pain is so severe that she is unable to walk.  This has made her mood very down.    Interval HPI (5/2/2024):  Migdalia Luna is a 84 y.o. female who presents to the clinic for a follow-up appointment for chronic upper and lower back pain. Since the last visit, Migdalia Luna states the pain has been worsening, most notably in the right lower lumbar area. Current pain intensity is 8/10.  She denies any new injuries or trauma.Patient denies night fever/night sweats, urinary incontinence, bowel incontinence, significant weight loss, significant motor weakness, and loss of sensations.    Initial HPI 02/01/2024 - Dr. Roman:  Migdalia Luna is a 84 y.o. female who presents to the clinic for the evaluation of chronic back pain.  She was referred by her primary care team for further evaluation management of this pain.  She has past medical history of migraine, depression, hypertension, hyperlipidemia, osteoporosis, GERD, insomnia, and multiple other medical comorbidities as listed in her chart.  The pain started 15+ years ago  and symptoms have been worsening.The pain is located in the midthoracic area.  The pain is described as  dull, aching  and is rated as 0/10. The pain is rated with a score of  0/10 on the BEST day and a score of 10/10 on the WORST day.  Symptoms interfere with daily activity. The pain is exacerbated by increased movement.  The pain is mitigated by rest.        Pain Disability Index (PDI) Score Review:      1/16/2025     1:14 PM 8/22/2024    10:40 AM 7/25/2024    10:19 AM   Last 3 PDI Scores   Pain Disability Index (PDI) 50 50 32           Non-Pharmacologic Treatments:  Physical Therapy/Home Exercise: no  Ice/Heat:yes  TENS: no  Acupuncture: no  Massage: yes  Chiropractic: no          Pain Medications:  - Opioids:   Norco  - Adjuvant Medications:  Ambien, Lyrica, Fosamax, Mobic, Robaxin, Imitrex, trazodone  - Anti-Coagulants:  None        Pain Procedures:     Externally:  -previous interventions, unknown types - Dr. Sarwat Roman:  -in clinic Thoracic paraspinals, middle trapezius, lumbar paraspinals, and right gluteal region TPI on 05/02/2024 with limited pain relief   -diagnostic bilateral L3-5 MBB on 08/14/2024 and 10/22/2024 with 80% pain relief  -bilateral L3-5 RFA on 11/5/24 with limited pain relief             Imaging/ Diagnostic Studies/ Labs (Reviewed on 1/16/2025):    7/25/2024 X-Ray Lumbar Complete Including Flex And Ext  FINDINGS:  Alignment is normal on the lateral view although a mild S shaped scoliosis is visible.  There is advanced degenerative disc space narrowing visible at T12-L1 through L5-S1.  Vertebral body heights are maintained. No fractures are identified.. The sacroiliac joints appear normal and symmetrical.  Normal alignment is maintained over a limited range of motion through flexion and extension.  Impression:   1.  Advanced chronic disc degeneration from T12 through S1 and mild S-shaped thoracolumbar scoliosis.  2.  No evidence of acute injury is appreciated.  3.  Normal alignment is maintained over a moderate range of motion through flexion flexion and extension       02/01/2024 X-Ray Thoracolumbar Spine AP Lateral  FINDINGS:  There is sigmoidal scoliosis thoracolumbar spine with dextroscoliosis thoracic spine, apex at T11, and levoscoliosis lumbar spine, apex at L3-4.  There is no compression fracture.  There is grade 1 spondylolisthesis with 3 mm anterolisthesis L3 on L4.  There is multilevel thoracolumbar degenerative disc disease with disc space narrowing and spurring.  Advanced lower lumbar facet arthropathy is also noted.  There is a large hiatal hernia.  Impression:   1.  Sigmoidal scoliosis thoracolumbar spine with multilevel advanced degenerative sequela.  2.  Large hiatal  "hernia.      CT abdomen pelvis 07/11/2022:  Visualized lung bases are clear.   Abdomen: The liver, spleen, adrenals, kidneys, and pancreas are within normal limits on this noncontrast exam. Cholelithiasis. No ascites or adenopathy. The abdominal aorta is nonaneurysmal.   Large hiatal hernia. There is inflammatory stranding associated with the right hemicolon. There are a few prominent mesenteric lymph nodes in the right lower quadrant. Normal caliber appendix.   Pelvis: No pelvic free fluid, mass, or adenopathy.        4/22/2021 Lumbar MRI             REVIEW OF SYSTEMS:  GENERAL:  No weight loss, malaise or fevers.  HEENT:   No recent changes in vision or hearing  NECK:  Negative for lumps, no difficulty with swallowing.  RESPIRATORY:  Negative for cough, wheezing or shortness of breath, patient denies any recent URI.  CARDIOVASCULAR:  Negative for chest pain, leg swelling or palpitations.  GI:  Negative for abdominal discomfort, blood in stools or black stools or change in bowel habits.  MUSCULOSKELETAL:  See HPI.  SKIN:  Negative for lesions, rash, and itching.  PSYCH:  No mood disorder or recent psychosocial stressors.  Patients sleep is not disturbed secondary to pain.  HEMATOLOGY/LYMPHOLOGY:  Negative for prolonged bleeding, bruising easily or swollen nodes.  Patient is not currently taking any anti-coagulants  NEURO:   No history of headaches, syncope, paralysis, seizures or tremors.  All other reviewed and negative other than HPI.        OBJECTIVE:    PHYSICAL EXAMINATION:  Vitals:    01/16/25 1315   BP: 116/79   Pulse: 92   Resp: 17   Weight: 59 kg (130 lb 1.1 oz)   Height: 5' 5" (1.651 m)   PainSc: 10-Worst pain ever   PainLoc: Back   Body mass index is 21.64 kg/m².   (reviewed on 1/16/2025)    GENERAL: Well appearing, in no acute distress, alert and oriented x3.  PSYCH:  Mood and affect appropriate.  SKIN: Skin color, texture, turgor normal, no rashes or lesions.  HEAD/FACE:  Normocephalic, atraumatic. "   PULM: No evidence of respiratory difficulty, symmetric chest rise.  BACK:  Positive Guthrie test.  Straight leg raising in the sitting and supine positions is negative to radicular pain.  Moderate pain to palpation over the thoracic and lumbar paraspinals and lower trapezius.  Limited range of motion secondary to pain reproduction.  Axial loading positive bilaterally in lumbar spine. TTP over SIJ, L>R.  Juancho's/ Hosea's: Positive. Sacroiliac Distraction Test (anterior pressure): Positive. Sacroiliac Compression Test (lateral pressure): Positive.   EXTREMITIES: No deformities, edema, or skin discoloration.   MUSCULOSKELETAL: Shoulder, and knee provocative maneuvers are negative.   Bilateral upper and lower extremity strength is normal and symmetric.  No atrophy or tone abnormalities are noted.  NEURO: Bilateral upper and lower extremity coordination and muscle stretch reflexes are physiologic and symmetric.  Plantar response are downgoing. No clonus.  No loss of sensation is noted.  GAIT: normal.              ASSESSMENT: 84 y.o. year old female with chronic upper and lower back pain, consistent with     1. Lumbar spondylosis        2. Primary osteoarthritis involving multiple joints        3. Chronic thoracic back pain, unspecified back pain laterality        4. Lumbar muscle pain  methocarbamoL (ROBAXIN) 750 MG Tab      5. Dorsalgia, unspecified  MRI Lumbar Spine Without Contrast      6. Degenerative lumbar spinal stenosis  Case Request-RAD/Other Procedure Area: L4/5 IL BETITO                PLAN:   - Interventions:    - S/p bilateral L3-5 RFA on 11/5/24 with limited pain relief.    - Schedule L4/5 IL BETITO - as pain seems to be generating more from lumbar spinal stenosis than facet joints.    - Consider SIJ injection for continued pain.    - S/p Thoracic paraspinals, middle trapezius, lumbar paraspinals, and right gluteal region TPI on 05/02/2024 in clinic with Dr. Roman with minimal benefit.    - Anticoagulation use:   ASA - takes on her own     - Medications:  - Refill Robaxin (methocarbamol) 750mg QHS PRN muscle spasms (or can take 1/2 tablet).  she understands this could cause drowsiness. Other common potential deleterious side effects associated with this medication including dizziness, drowsiness, dry mouth, or tingling sensation in the upper or lower extremities.   Encouraged to try to take BID for additional pain relief if doesn't cause drowsiness.    - Continue OTC Aleve 220mg x 2 tablets qam and midday, which provides pain relief.      - LA  reviewed and appropriate.          - Therapy:  Advised patient continue with activities and exercises as tolerated.      - Diagnostic/ Imaging:   - Update lumbar MRI to further evaluate discogenic/ lumbar spinal stenosis - to be completed prior to BETITO.  Lumbar MRI (2021) reviewed.         - Records: Attempted to obtain previous treatment notes from Dr. Jumana Woo; nothing received.      - Follow up visit: 4 weeks post-procedure - in clinic (per pt request) = [facility]     Future Appointments   Date Time Provider Department Center   1/23/2025 12:30 PM [facility] MRI1 [facility] MRI [facility]   2/27/2025 10:40 AM Mindy Sexton PA-C [facility]   4/15/2025 12:30 PM Emmanuel Seymour MD [facility]        - Direct patient care provided: 20 minutes+. Over 50% of the time was spent counseling/educating the patient. Total time spent on patient care including prep time reviewing the chart before the visit, time spent with patient during the visit, and the time spent after the visit reviewing studies, documenting note, obtaining information from collaborative providers, etc.: >40 minutes.     - Patient Questions: Answered all of the patient's questions regarding diagnosis, therapy, and treatment.    - This condition does not require this patient to take time off of work, and the primary goal of our Pain Management services is to improve the patient's functional capacity.   -  I discussed the risks, benefits, and alternatives to potential treatment options. All questions and concerns were fully addressed today in clinic.         Mindy Sexton PA-C  Interventional Pain Management - [facility]    Disclaimer:  This note was prepared using voice recognition system and is likely to have sound alike errors that may have been overlooked even after proof reading.  Please call me with any questions.

## 2025-01-17 ENCOUNTER — PATIENT MESSAGE (OUTPATIENT)
Dept: PAIN MEDICINE | Facility: CLINIC | Age: 85
End: 2025-01-17
Payer: MEDICARE

## 2025-01-17 NOTE — PRE-PROCEDURE INSTRUCTIONS
Spoke with patient regarding procedure scheduled on 1.30     Arrival time 0815     Has patient been sick with fever or on antibiotics within the last 7 days? No     Does the patient have any open wounds, sores or rashes? No     Does the patient have any recent fractures? no     Has patient received a vaccination within the last 7 days? No     Received the COVID vaccination? yes     Has the patient stopped all medications as directed? hold asa 5 days prior to procedure. No clearance needed, self prescribed     Does patient have a pacemaker, defibrillator, or implantable stimulator? No     Does the patient have a ride to and from procedure and someone reliable to remain with patient?       Is the patient diabetic? no      Does the patient have sleep apnea? Or use O2 at home? no     Is the patient receiving sedation? Yes      Is the patient instructed to remain NPO beginning at midnight the night before their procedure? yes     Procedure location confirmed with patient? Yes     Covid- Denies signs/symptoms. Instructed to notify PAT/MD if any changes.

## 2025-01-24 ENCOUNTER — HOSPITAL ENCOUNTER (OUTPATIENT)
Dept: RADIOLOGY | Facility: HOSPITAL | Age: 85
Discharge: HOME | End: 2025-01-24
Attending: PHYSICIAN ASSISTANT
Payer: MEDICARE

## 2025-01-24 DIAGNOSIS — M54.9 DORSALGIA, UNSPECIFIED: ICD-10-CM

## 2025-01-24 PROCEDURE — 72148 MRI LUMBAR SPINE W/O DYE: CPT | Mod: 26,,, | Performed by: RADIOLOGY

## 2025-01-24 PROCEDURE — 72148 MRI LUMBAR SPINE W/O DYE: CPT | Mod: TC,PN

## 2025-01-30 ENCOUNTER — HOSPITAL ENCOUNTER (OUTPATIENT)
Facility: HOSPITAL | Age: 85
Discharge: HOME | End: 2025-01-30
Attending: PHYSICAL MEDICINE & REHABILITATION | Admitting: PHYSICAL MEDICINE & REHABILITATION
Payer: MEDICARE

## 2025-01-30 VITALS
BODY MASS INDEX: 21.79 KG/M2 | HEIGHT: 65 IN | WEIGHT: 130.75 LBS | RESPIRATION RATE: 17 BRPM | TEMPERATURE: 98 F | OXYGEN SATURATION: 98 % | DIASTOLIC BLOOD PRESSURE: 79 MMHG | HEART RATE: 68 BPM | SYSTOLIC BLOOD PRESSURE: 147 MMHG

## 2025-01-30 DIAGNOSIS — M54.16 LUMBAR RADICULOPATHY: Primary | ICD-10-CM

## 2025-01-30 PROCEDURE — 25500020 PHARM REV CODE 255: Performed by: PHYSICAL MEDICINE & REHABILITATION

## 2025-01-30 PROCEDURE — 62323 NJX INTERLAMINAR LMBR/SAC: CPT | Performed by: PHYSICAL MEDICINE & REHABILITATION

## 2025-01-30 PROCEDURE — 63600175 PHARM REV CODE 636 W HCPCS: Performed by: PHYSICAL MEDICINE & REHABILITATION

## 2025-01-30 PROCEDURE — 62323 NJX INTERLAMINAR LMBR/SAC: CPT | Mod: ,,, | Performed by: PHYSICAL MEDICINE & REHABILITATION

## 2025-01-30 RX ORDER — ONDANSETRON HYDROCHLORIDE 2 MG/ML
4 INJECTION, SOLUTION INTRAVENOUS ONCE AS NEEDED
Status: DISCONTINUED | OUTPATIENT
Start: 2025-01-30 | End: 2025-01-30 | Stop reason: HOSPADM

## 2025-01-30 RX ORDER — MIDAZOLAM HYDROCHLORIDE 1 MG/ML
INJECTION, SOLUTION INTRAMUSCULAR; INTRAVENOUS
Status: DISCONTINUED | OUTPATIENT
Start: 2025-01-30 | End: 2025-01-30 | Stop reason: HOSPADM

## 2025-01-30 RX ORDER — METHYLPREDNISOLONE ACETATE 80 MG/ML
INJECTION, SUSPENSION INTRA-ARTICULAR; INTRALESIONAL; INTRAMUSCULAR; SOFT TISSUE
Status: DISCONTINUED | OUTPATIENT
Start: 2025-01-30 | End: 2025-01-30 | Stop reason: HOSPADM

## 2025-01-30 RX ORDER — BUPIVACAINE HYDROCHLORIDE 2.5 MG/ML
INJECTION, SOLUTION EPIDURAL; INFILTRATION; INTRACAUDAL
Status: DISCONTINUED | OUTPATIENT
Start: 2025-01-30 | End: 2025-01-30 | Stop reason: HOSPADM

## 2025-01-30 RX ORDER — FENTANYL CITRATE 50 UG/ML
INJECTION, SOLUTION INTRAMUSCULAR; INTRAVENOUS
Status: DISCONTINUED | OUTPATIENT
Start: 2025-01-30 | End: 2025-01-30 | Stop reason: HOSPADM

## 2025-01-30 NOTE — OP NOTE
Lumbar Interlaminar Epidural Steroid Injection under Fluoroscopic Guidance.   Time-out taken to identify patient and procedure prior to starting the procedure.     01/30/2025    PROCEDURE: Interlaminar epidural steroid injection under fluoroscopic guidance.     Pre-Op diagnosis: Degenerative lumbar spinal stenosis [M48.061]    Post-Op diagnosis: Degenerative lumbar spinal stenosis [M48.061]    PHYSICIAN: Steve Roman MD    ASSISTANTS: None     SEDATION: Conscious sedation provided by M.D    The patient was monitored with continuous pulse oximetry, EKG, and intermittent blood pressure monitors, immediately prior to administration of sedation.  The patient was hemodynamically stable throughout the entire process was responsive to voice, and breathing spontaneously.  Supplemental O2 was provided at 2L/min via nasal cannula.  Patient was comfortable for the duration of the procedure.     There was a total of 2mg IV Midazolam and 50mcg Fentanyl titrated for the procedure    Total sedation time was >10minutes and <20minutes      ESTIMATED BLOOD LOSS: none.     COMPLICATIONS: none.     SPECIMENS: none    TECHNIQUE: With the patient laying in a prone position, the area was prepped and draped in the usual sterile fashion using ChloraPrep and a fenestrated drape. 1% lidocaine was given using a 27-gauge needle by raising a wheal and going down to the hub of the needle over the L4/5 interlaminar space.  The interlaminar space was then approached with a 3.5 inch 18-gauge Touhy needle was introduced under fluoroscopic guidance in the AP and Lateral view. Once the Ligamentum flavum was encountered loss of resistance to saline was used to enter the epidural space. With positive loss of resistance and negative CSF or Blood, 3mL contrast dye Omnipaque (300mg/ml) was injected to confirm placement and there was no vascular runoff. Then 1ml 80mg/ml Depomedrol + 1mL 0.25% Bupivicaine + 8mL preservative free normal saline was  injected slowly. Displacement of the radio opaque contrast after injection of the medication confirmed that the medication went into the area of the epidural space.  The patient tolerated the procedure well.         The patient was monitored after the procedure.   They were given post-procedure and discharge instructions to follow at home.  The patient was discharged in a stable condition.

## 2025-01-30 NOTE — DISCHARGE SUMMARY
Discharge Note  Short Stay      SUMMARY     Admit Date: 1/30/2025    Attending Physician: Steve Roman MD        Discharge Physician: Steve Roman MD        Discharge Date: 1/30/2025 9:08 AM    Procedure(s) (LRB):  L4/5 IL BETITO (N/A)    Final Diagnosis: Degenerative lumbar spinal stenosis [M48.061]    Disposition: Home or self care    Patient Instructions:   Current Discharge Medication List        CONTINUE these medications which have NOT CHANGED    Details   aspirin 81 MG Chew Take 1 tablet (81 mg total) by mouth once daily.  Qty: 90 tablet, Refills: 3      methocarbamoL (ROBAXIN) 750 MG Tab Take 1 tablet (750 mg total) by mouth nightly as needed (pain).  Qty: 90 tablet, Refills: 1    Associated Diagnoses: Lumbar muscle pain      olmesartan-hydrochlorothiazide (BENICAR HCT) 40-12.5 mg Tab Take 1 tablet by mouth once daily.  Qty: 90 tablet, Refills: 3    Comments: .      senna (SENOKOT) 8.6 mg tablet Take 1 tablet by mouth once daily.  Qty: 90 tablet, Refills: 0      zolpidem (AMBIEN) 10 mg Tab TAKE 1 TABLET EVERY NIGHT AS NEEDED  Qty: 90 tablet, Refills: 0    Associated Diagnoses: Insomnia, unspecified type                 Discharge Diagnosis: Degenerative lumbar spinal stenosis [M48.061]  Condition on Discharge: Stable with no complications to procedure   Diet on Discharge: Same as before.  Activity: as per instruction sheet.  Discharge to: Home with a responsible adult.  Follow up: 2-4 weeks       Please call the office at [phone] if you experience any weakness or loss of sensation, fever > 101.5, pain uncontrolled with oral medications, persistent nausea/vomiting/or diarrhea, redness or drainage from the incisions, or any other worrisome concerns. If physician on call was not reached or could not communicate with our office for any reason please go to the nearest emergency department

## 2025-01-30 NOTE — DISCHARGE INSTRUCTIONS

## 2025-01-30 NOTE — H&P
HPI  Patient presenting for Procedure(s) (LRB):  L4/5 IL BETITO (N/A)     No health changes since previous encounter    Past Medical History:   Diagnosis Date    Anemia     Arthritis     left shoulder    Asthma     Chronic bronchitis     Depression     Essential hypertension     GERD (gastroesophageal reflux disease)     History of colon polyps     Due for colonoscopy 2021    Hyperlipidemia     Insomnia     Migraine     Osteoporosis     started on fosamax 10/22    Thyroid disease      Past Surgical History:   Procedure Laterality Date    BACK SURGERY      epidural    BREAST BIOPSY Right over 10 yrs ago    benign    COLONOSCOPY N/A 07/11/2016    Procedure: COLONOSCOPY;  Surgeon: Yordy Penn MD;  Location: [facility];  Service: Endoscopy;  Laterality: N/A;    ESOPHAGOGASTRODUODENOSCOPY N/A 02/23/2023    Procedure: EGD (ESOPHAGOGASTRODUODENOSCOPY);  Surgeon: Yao Palmer MD;  Location: [facility];  Service: Endoscopy;  Laterality: N/A;    EYE SURGERY      INJECTION OF ANESTHETIC AGENT AROUND MEDIAL BRANCH NERVES INNERVATING LUMBAR FACET JOINT Bilateral 8/6/2024    Procedure: Diagnostic Bilateral L3-5 MBB #1;  Surgeon: Steve Roman MD;  Location: [facility];  Service: Pain Management;  Laterality: Bilateral;    INJECTION OF ANESTHETIC AGENT AROUND MEDIAL BRANCH NERVES INNERVATING LUMBAR FACET JOINT Bilateral 10/3/2024    Procedure: Diagnostic Bilateral L3-5 MBB #2;  Surgeon: Steve Roman MD;  Location: [facility];  Service: Pain Management;  Laterality: Bilateral;    JOINT REPLACEMENT      KNEE ARTHROPLASTY      KNEE ARTHROSCOPY      bilaterly    RADIOFREQUENCY THERMOCOAGULATION Bilateral 11/5/2024    Procedure: Bilateral L3-5 RFA;  Surgeon: Steve Roman MD;  Location: [facility];  Service: Pain Management;  Laterality: Bilateral;    TONSILLECTOMY      TUBAL LIGATION       Review of patient's allergies indicates:  No Known Allergies     No current facility-administered medications  "on file prior to encounter.     Current Outpatient Medications on File Prior to Encounter   Medication Sig Dispense Refill    aspirin 81 MG Chew Take 1 tablet (81 mg total) by mouth once daily. 90 tablet 3    methocarbamoL (ROBAXIN) 750 MG Tab Take 1 tablet (750 mg total) by mouth nightly as needed (pain). 90 tablet 1    olmesartan-hydrochlorothiazide (BENICAR HCT) 40-12.5 mg Tab Take 1 tablet by mouth once daily. 90 tablet 3    senna (SENOKOT) 8.6 mg tablet Take 1 tablet by mouth once daily. 90 tablet 0    zolpidem (AMBIEN) 10 mg Tab TAKE 1 TABLET EVERY NIGHT AS NEEDED 90 tablet 0        PMHx, PSHx, Allergies, Medications reviewed in epic    ROS negative except pain complaints in HPI    OBJECTIVE:    /77 (BP Location: Right arm, Patient Position: Sitting)   Pulse 75   Temp 97.1 °F (36.2 °C) (Temporal)   Resp 18   Ht 5' 5" (1.651 m)   Wt 59.3 kg (130 lb 11.7 oz)   SpO2 98%   Breastfeeding No   BMI 21.76 kg/m²     PHYSICAL EXAMINATION:    GENERAL: Well appearing, in no acute distress, alert and oriented x3.  PSYCH:  Mood and affect appropriate.  SKIN: Skin color, texture, turgor normal, no rashes or lesions which will impact the procedure.  CV: RRR with palpation of the radial artery.  PULM: No evidence of respiratory difficulty, symmetric chest rise. Clear to auscultation.  NEURO: Cranial nerves grossly intact.    Plan:    Proceed with procedure as planned Procedure(s) (LRB):  L4/5 IL BETITO (N/A)    Steve Roman MD  01/30/2025            "

## 2025-02-20 ENCOUNTER — TELEPHONE (OUTPATIENT)
Dept: PAIN MEDICINE | Facility: CLINIC | Age: 85
End: 2025-02-20
Payer: MEDICARE

## 2025-02-21 DIAGNOSIS — Z00.00 ENCOUNTER FOR MEDICARE ANNUAL WELLNESS EXAM: ICD-10-CM

## 2025-02-27 ENCOUNTER — OFFICE VISIT (OUTPATIENT)
Dept: PAIN MEDICINE | Facility: CLINIC | Age: 85
End: 2025-02-27
Payer: MEDICARE

## 2025-02-27 VITALS
RESPIRATION RATE: 17 BRPM | DIASTOLIC BLOOD PRESSURE: 81 MMHG | HEIGHT: 65 IN | BODY MASS INDEX: 21.67 KG/M2 | WEIGHT: 130.06 LBS | SYSTOLIC BLOOD PRESSURE: 134 MMHG

## 2025-02-27 DIAGNOSIS — M48.061 DEGENERATIVE LUMBAR SPINAL STENOSIS: Primary | ICD-10-CM

## 2025-02-27 DIAGNOSIS — M48.061 LUMBAR FORAMINAL STENOSIS: ICD-10-CM

## 2025-02-27 DIAGNOSIS — M51.26 LUMBAR DISC HERNIATION: ICD-10-CM

## 2025-02-27 DIAGNOSIS — M15.0 PRIMARY OSTEOARTHRITIS INVOLVING MULTIPLE JOINTS: ICD-10-CM

## 2025-02-27 DIAGNOSIS — M43.16 SPONDYLOLISTHESIS, LUMBAR REGION: ICD-10-CM

## 2025-02-27 DIAGNOSIS — M79.18 LUMBAR MUSCLE PAIN: ICD-10-CM

## 2025-02-27 PROCEDURE — 99999 PR PBB SHADOW E&M-EST. PATIENT-LVL III: CPT | Mod: PBBFAC,HCNC,, | Performed by: PHYSICIAN ASSISTANT

## 2025-02-27 RX ORDER — METHOCARBAMOL 750 MG/1
750 TABLET, FILM COATED ORAL 2 TIMES DAILY PRN
Qty: 180 TABLET | Refills: 0 | Status: SHIPPED | OUTPATIENT
Start: 2025-02-27

## 2025-02-27 NOTE — PROGRESS NOTES
"Chronic Pain -- Established Patient (Follow-up visit)    Referring Physician: John Allen MD    PCP: John Allen MD       Chief complaint:  Low-back Pain     Lumbar discogenic pain (+shopping cart sign), no radicular leg pain       SUBJECTIVE:    Interval History (2/27/2025): Patient presents today for follow-up visit.  she underwent L4/5 IL BETITO on 1/30/25.  The patient reports that she is/was better following the procedure.  she reports 80% pain relief.  The changes lasted 4 weeks so far.  The changes have continued through this visit.  Patient reports pain as "0/10 today.  She only really has pain when she stands for too long.  Overall, she is much improved since the procedure, although pain is not eradicated.  She is unable to garden due to bending over.  She does have a back brace, but it is hard to put on and off.  She will utilize this in the meantime.  She gets excellent pain relief from Robaxin.    Interval History (1/16/2025): Migdalia Luna presents today for follow-up visit.  S/p diagnostic bilateral L3-5 MBB on 08/14/2024 and 10/22/2024 with 80% pain relief - per reported.   she ultimately underwent bilateral L3-5 RFA on 11/5/24 (about 10 weeks ago).  The patient reports that she is/was unchanged following the procedure. She feels that she was coaxed into completing the 2nd MBB and ultimately the RFA. She has found no benefit from these procedures and feels at a loss at this point.  Pain is worse with walking.  She does feel that she gets some pain relief when bending forward.    Patient reports pain as 10/10 today.    Interval History (8/22/2024):  Patient presents today for follow-up visit.  Patient was last seen on  8/6/2024 bilateral L3-5 MBB with 80% relief x 1 day. She is here today because of a lack of continued pain relief and did not realize that she was not to be expected to have long term pain relief form the block procedure. She continues to have axial low back pain without " radiculopathy.  Patient denies night fever/night sweats, urinary incontinence, bowel incontinence, significant weight loss and significant motor weakness.   Patient denies any other complaints or concerns at this time.     Interval History (7/25/2024):  Patient presents today for follow-up visit.  Patient was last seen almost 3 months ago. At that visit, she had Thoracic paraspinals, middle trapezius, lumbar paraspinals, and right gluteal region TPI with limited pain relief.  Patient reports pain as 5/10 today with medication.  Today, she mostly complains of low back pain, with no radicular pain.  She reports that she has had an epidural in the past with Dr. Jumana Woo with good pain relief.  She last had a lumbar MRI in 2021.  She reports that she was very active up until July of 2023 where she was playing tennis.  Now, the pain is so severe that she is unable to walk.  This has made her mood very down.    Interval HPI (5/2/2024):  Migdalia Luna is a 84 y.o. female who presents to the clinic for a follow-up appointment for chronic upper and lower back pain. Since the last visit, Migdalia Luna states the pain has been worsening, most notably in the right lower lumbar area. Current pain intensity is 8/10.  She denies any new injuries or trauma.Patient denies night fever/night sweats, urinary incontinence, bowel incontinence, significant weight loss, significant motor weakness, and loss of sensations.    Initial HPI 02/01/2024 - Dr. Roman:  Migdalia Luna is a 84 y.o. female who presents to the clinic for the evaluation of chronic back pain.  She was referred by her primary care team for further evaluation management of this pain.  She has past medical history of migraine, depression, hypertension, hyperlipidemia, osteoporosis, GERD, insomnia, and multiple other medical comorbidities as listed in her chart.  The pain started 15+ years ago  and symptoms have been worsening.The pain is located in the midthoracic  area.  The pain is described as  dull, aching  and is rated as 0/10. The pain is rated with a score of  0/10 on the BEST day and a score of 10/10 on the WORST day.  Symptoms interfere with daily activity. The pain is exacerbated by increased movement.  The pain is mitigated by rest.        Pain Disability Index (PDI) Score Review:      2/27/2025    12:31 PM 1/16/2025     1:14 PM 8/22/2024    10:40 AM   Last 3 PDI Scores   Pain Disability Index (PDI) 6 50 50           Non-Pharmacologic Treatments:  Physical Therapy/Home Exercise: no  Ice/Heat:yes  TENS: no  Acupuncture: no  Massage: yes  Chiropractic: no          Pain Medications:  - Opioids:  Norco  - Adjuvant Medications:  Ambien, Lyrica, Fosamax, Mobic, Robaxin, Imitrex, trazodone  - Anti-Coagulants:  None        Pain Procedures:     Externally:  -previous interventions, unknown types - Dr. Sarwat Roman:  -in clinic Thoracic paraspinals, middle trapezius, lumbar paraspinals, and right gluteal region TPI on 05/02/2024 with limited pain relief   -diagnostic bilateral L3-5 MBB on 08/14/2024 and 10/22/2024 with 80% reported pain relief, although patient reports less  -bilateral L3-5 RFA on 11/5/24 with limited pain relief   -L4/5 IL BETITO on 1/30/25 with 80% pain relief            Imaging/ Diagnostic Studies/ Labs (Reviewed on 2/27/2025):    01/24/2025 MRI Lumbar Spine Without Contrast  COMPARISON: Lumbar radiograph 07/25/2024    FINDINGS:  Alignment: Dextroscoliosis.  Mild L1-L2 retrolisthesis.  Grade 1 L3-L4 anterolisthesis.    Vertebrae: Lumbar vertebral body heights are maintained.  Endplate degenerative changes throughout the lumbar spine with mild T12-L1 endplate edema.  No evidence of an acute fracture.  Marrow signal is otherwise within normal limits.    Discs: Disc desiccation and height loss throughout the lumbar spine.  Height loss appears most pronounced at L5-S1.    Cord: Within normal limits.  Conus terminates at L2.    Degenerative  findings:    T12-L1: Broad-based posterior disc bulge is asymmetric to the left with a superimposed left paracentral disc extrusion.  Disc material courses caudally roughly 1.2 cm.  Mild bilateral facet arthropathy contributes to mild left-sided neural foraminal stenosis.  No significant spinal canal stenosis.    L1-L2: Mild retrolisthesis.  Broad-based posterior disc bulge is slightly asymmetric to the left.  Bilateral facet arthropathy and ligamentum flavum hypertrophy contributes to mild right and moderate left-sided neural foraminal stenosis.  No significant spinal canal stenosis.    L2-L3: Broad-based posterior disc bulge, facet arthropathy and ligamentum flavum hypertrophy contribute to moderate spinal canal stenosis, moderate right and mild left-sided neural foraminal stenosis.    L3-L4: Grade 1 anterolisthesis.  Broad-based posterior disc bulge, significant facet arthropathy and ligamentum flavum hypertrophy contribute to moderate to severe spinal canal stenosis, severe right and mild left-sided neural foraminal stenosis.    L4-L5: Broad-based posterior disc bulge involves the bilateral neural foramina with superimposed right neural foraminal disc protrusion.  Bilateral facet arthropathy and ligamentum flavum hypertrophy contribute to mild spinal canal stenosis with mild left greater than right lateral recess stenosis.  There is moderate to severe bilateral neural foraminal stenosis.    L5-S1: Broad-based posterior disc bulge involves the left greater than right neural foramina.  Minimal narrowing of the left lateral recess without significant spinal canal stenosis.  Mild facet arthropathy contributes to mild right and moderate left-sided neural foraminal stenosis.    Paraspinal muscles & soft tissues: Fatty atrophy of the posterior paraspinous musculature.  Paraspinal soft tissues are otherwise within normal limits.  Partially visualized cholelithiasis.    Impression  1. Multilevel degenerative changes of  the lumbar spine are most pronounced at L3-L4 with moderate to severe spinal canal stenosis, severe right and mild left-sided neural foraminal stenosis.  2. L2-L3 moderate spinal canal stenosis with moderate right and mild left-sided neural foraminal stenosis.  3. L4-L5 mild spinal canal stenosis with moderate to severe bilateral neural foraminal stenosis.  4. T12-L1 Modic type 1 changes.  5. Cholelithiasis.            7/25/2024 X-Ray Lumbar Complete Including Flex And Ext  FINDINGS:  Alignment is normal on the lateral view although a mild S shaped scoliosis is visible.  There is advanced degenerative disc space narrowing visible at T12-L1 through L5-S1.  Vertebral body heights are maintained. No fractures are identified.. The sacroiliac joints appear normal and symmetrical.  Normal alignment is maintained over a limited range of motion through flexion and extension.  Impression:   1.  Advanced chronic disc degeneration from T12 through S1 and mild S-shaped thoracolumbar scoliosis.  2.  No evidence of acute injury is appreciated.  3.  Normal alignment is maintained over a moderate range of motion through flexion flexion and extension       02/01/2024 X-Ray Thoracolumbar Spine AP Lateral  FINDINGS:  There is sigmoidal scoliosis thoracolumbar spine with dextroscoliosis thoracic spine, apex at T11, and levoscoliosis lumbar spine, apex at L3-4.  There is no compression fracture.  There is grade 1 spondylolisthesis with 3 mm anterolisthesis L3 on L4.  There is multilevel thoracolumbar degenerative disc disease with disc space narrowing and spurring.  Advanced lower lumbar facet arthropathy is also noted.  There is a large hiatal hernia.  Impression:   1.  Sigmoidal scoliosis thoracolumbar spine with multilevel advanced degenerative sequela.  2.  Large hiatal hernia.      CT abdomen pelvis 07/11/2022:  Visualized lung bases are clear.   Abdomen: The liver, spleen, adrenals, kidneys, and pancreas are within normal limits  "on this noncontrast exam. Cholelithiasis. No ascites or adenopathy. The abdominal aorta is nonaneurysmal.   Large hiatal hernia. There is inflammatory stranding associated with the right hemicolon. There are a few prominent mesenteric lymph nodes in the right lower quadrant. Normal caliber appendix.   Pelvis: No pelvic free fluid, mass, or adenopathy.        4/22/2021 Lumbar MRI             REVIEW OF SYSTEMS:  GENERAL:  No weight loss, malaise or fevers.  HEENT:   No recent changes in vision or hearing  NECK:  Negative for lumps, no difficulty with swallowing.  RESPIRATORY:  Negative for cough, wheezing or shortness of breath, patient denies any recent URI.  CARDIOVASCULAR:  Negative for chest pain, leg swelling or palpitations.  GI:  Negative for abdominal discomfort, blood in stools or black stools or change in bowel habits.  MUSCULOSKELETAL:  See HPI.  SKIN:  Negative for lesions, rash, and itching.  PSYCH:  No mood disorder or recent psychosocial stressors.  Patients sleep is not disturbed secondary to pain.  HEMATOLOGY/LYMPHOLOGY:  Negative for prolonged bleeding, bruising easily or swollen nodes.  Patient is not currently taking any anti-coagulants  NEURO:   No history of headaches, syncope, paralysis, seizures or tremors.  All other reviewed and negative other than HPI.          OBJECTIVE:    PHYSICAL EXAMINATION:  Vitals:    02/27/25 1231   BP: 134/81   Pulse: (P) 82   Resp: 17   Weight: 59 kg (130 lb 1.1 oz)   Height: 5' 5" (1.651 m)   PainSc: 0-No pain   PainLoc: Back   Body mass index is 21.64 kg/m².   (reviewed on 2/27/2025)    GENERAL: Well appearing, in no acute distress, alert and oriented x3.  PSYCH:  Mood and affect appropriate.  SKIN: Skin color, texture, turgor normal, no rashes or lesions.  HEAD/FACE:  Normocephalic, atraumatic.   PULM: No evidence of respiratory difficulty, symmetric chest rise.  GI: no obvious distention.   BACK:  Positive Guthrie test.  Straight leg raising in the sitting and " supine positions is negative to radicular pain.  Moderate pain to palpation over the thoracic and lumbar paraspinals and lower trapezius.  Limited range of motion secondary to pain reproduction.  Axial loading positive bilaterally in lumbar spine. TTP over SIJ, L>R.  Juancho's/ Hosea's: Positive. Sacroiliac Distraction Test (anterior pressure): Positive. Sacroiliac Compression Test (lateral pressure): Positive.   EXTREMITIES: No deformities, edema, or skin discoloration.   MUSCULOSKELETAL: Shoulder, and knee provocative maneuvers are negative.   Bilateral upper and lower extremity strength is normal and symmetric.  No atrophy or tone abnormalities are noted.  NEURO: Bilateral upper and lower extremity coordination and muscle stretch reflexes are physiologic and symmetric.  Plantar response are downgoing. No clonus.  No loss of sensation is noted.  GAIT: normal.              ASSESSMENT: 85 y.o. year old female with chronic upper and lower back pain, consistent with     1. Degenerative lumbar spinal stenosis        2. Lumbar foraminal stenosis        3. Spondylolisthesis, lumbar region        4. Primary osteoarthritis involving multiple joints        5. Lumbar disc herniation        6. Lumbar muscle pain  methocarbamoL (ROBAXIN) 750 MG Tab            PLAN:   - Interventions:    - S/p L4/5 IL BETITO on 1/30/25 with 80% pain relief; she feels much better than she did prior to last injection.    - Consider SIJ injection for continued pain.    - S/p bilateral L3-5 RFA on 11/5/24 with limited pain relief.  - S/p Thoracic paraspinals, middle trapezius, lumbar paraspinals, and right gluteal region TPI on 05/02/2024 in clinic with Dr. Roman with minimal benefit.    - Anticoagulation use:  ASA - takes on her own     - Medications:  - Refill Robaxin (methocarbamol) 750mg QHS PRN muscle spasms (or can take 1/2 tablet).  she understands this could cause drowsiness. Other common potential deleterious side effects associated with this  medication including dizziness, drowsiness, dry mouth, or tingling sensation in the upper or lower extremities.   Encouraged to try to take BID for additional pain relief if doesn't cause drowsiness.    - Continue OTC Aleve 220mg x 2 tablets qam and midday, which provides pain relief.      - LA  reviewed and appropriate.          - Therapy:  Advised patient continue with activities and exercises as tolerated.      - Diagnostic/ Imaging: No new imaging ordered. Previous imaging reviewed: Lumbar MRI (01/24/2025) reviewed.     - Records: Attempted to obtain previous treatment notes from Dr. Jumana Woo; nothing received.      - Follow up visit: 3 months follow-up  - in clinic (per pt request) = [facility]     Future Appointments   Date Time Provider Department Center   5/15/2025 10:20 AM Mindy Sexton PA-C [facility]          - Direct patient care provided: 18 minutes. Over 50% of the time was spent counseling/educating the patient. Total time spent on patient care including prep time reviewing the chart before the visit, time spent with patient during the visit, and the time spent after the visit reviewing studies, documenting note, obtaining information from collaborative providers, etc.: >40 minutes.     - Patient Questions: Answered all of the patient's questions regarding diagnosis, therapy, and treatment.    - This condition does not require this patient to take time off of work, and the primary goal of our Pain Management services is to improve the patient's functional capacity.   - I discussed the risks, benefits, and alternatives to potential treatment options. All questions and concerns were fully addressed today in clinic.         Mindy Setxon PA-C  Interventional Pain Management - [facility]    Disclaimer:  This note was prepared using voice recognition system and is likely to have sound alike errors that may have been overlooked even after proof reading.  Please call me with  any questions.

## 2025-03-12 DIAGNOSIS — G47.00 INSOMNIA, UNSPECIFIED TYPE: ICD-10-CM

## 2025-03-12 RX ORDER — ZOLPIDEM TARTRATE 10 MG/1
10 TABLET ORAL NIGHTLY PRN
Qty: 90 TABLET | Refills: 0 | Status: SHIPPED | OUTPATIENT
Start: 2025-03-12

## 2025-03-12 NOTE — TELEPHONE ENCOUNTER
Care Due:                  Date            Visit Type   Department     Provider  --------------------------------------------------------------------------------                                MYCHART                              ANNUAL                              CHECKUP/PHY  PRV INTERNAL  Last Visit: 10-      [facility]       John Allen  Next Visit: None Scheduled  None         None Found                                                            Last  Test          Frequency    Reason                     Performed    Due Date  --------------------------------------------------------------------------------    CMP.........  12 months..  olmesartan-hydrochlorothi  02- 02-                             azide....................    [employer] Embedded Care Due Messages. Reference number: [id number].   3/12/2025 1:36:39 PM CDT

## 2025-05-15 ENCOUNTER — OFFICE VISIT (OUTPATIENT)
Dept: PAIN MEDICINE | Facility: CLINIC | Age: 85
End: 2025-05-15
Payer: MEDICARE

## 2025-05-15 VITALS
SYSTOLIC BLOOD PRESSURE: 131 MMHG | RESPIRATION RATE: 17 BRPM | DIASTOLIC BLOOD PRESSURE: 66 MMHG | HEIGHT: 65 IN | WEIGHT: 131.63 LBS | HEART RATE: 85 BPM | BODY MASS INDEX: 21.93 KG/M2

## 2025-05-15 DIAGNOSIS — G89.29 ACUTE EXACERBATION OF CHRONIC LOW BACK PAIN: ICD-10-CM

## 2025-05-15 DIAGNOSIS — M48.061 LUMBAR FORAMINAL STENOSIS: ICD-10-CM

## 2025-05-15 DIAGNOSIS — M47.816 LUMBAR SPONDYLOSIS: ICD-10-CM

## 2025-05-15 DIAGNOSIS — M15.0 PRIMARY OSTEOARTHRITIS INVOLVING MULTIPLE JOINTS: Primary | ICD-10-CM

## 2025-05-15 DIAGNOSIS — M51.26 LUMBAR DISC HERNIATION: ICD-10-CM

## 2025-05-15 DIAGNOSIS — M48.061 DEGENERATIVE LUMBAR SPINAL STENOSIS: Primary | ICD-10-CM

## 2025-05-15 DIAGNOSIS — M15.0 PRIMARY OSTEOARTHRITIS INVOLVING MULTIPLE JOINTS: ICD-10-CM

## 2025-05-15 DIAGNOSIS — M54.50 ACUTE EXACERBATION OF CHRONIC LOW BACK PAIN: ICD-10-CM

## 2025-05-15 DIAGNOSIS — M43.16 SPONDYLOLISTHESIS, LUMBAR REGION: ICD-10-CM

## 2025-05-15 PROCEDURE — 99999 PR PBB SHADOW E&M-EST. PATIENT-LVL III: CPT | Mod: PBBFAC,HCNC,, | Performed by: PHYSICIAN ASSISTANT

## 2025-05-15 RX ORDER — METHYLPREDNISOLONE 4 MG/1
TABLET ORAL
Qty: 21 EACH | Refills: 0 | Status: SHIPPED | OUTPATIENT
Start: 2025-05-15

## 2025-05-15 RX ORDER — HYDROCODONE BITARTRATE AND ACETAMINOPHEN 5; 325 MG/1; MG/1
.5-1 TABLET ORAL DAILY PRN
Qty: 30 TABLET | Refills: 0 | Status: SHIPPED | OUTPATIENT
Start: 2025-06-13

## 2025-05-15 RX ORDER — HYDROCODONE BITARTRATE AND ACETAMINOPHEN 5; 325 MG/1; MG/1
.5-1 TABLET ORAL DAILY PRN
Qty: 30 TABLET | Refills: 0 | Status: SHIPPED | OUTPATIENT
Start: 2025-05-15

## 2025-05-15 NOTE — PROGRESS NOTES
Chronic Pain -- Established Patient (Follow-up visit)    Referring Physician: John Allen MD    PCP: John Allen MD       Chief complaint:  Follow-up     Lumbar discogenic pain (+shopping cart sign), no radicular leg pain       SUBJECTIVE:    Interval History (5/15/2025):  Migdalia Luna presents for follow-up of chronic back pain in both upper and lower back. Her back pain has returned following a previous epidural injection, which initially provided relief. She denies leg pain at this time.  Patient was last seen on 2/27/2025.   She reports difficulty maintaining an upright posture and leaning forward. She experiences significant pain with daily activities such as making the bed, walking to the kitchen, leaning over, and filling the . She expresses frustration with her limited mobility and has stopped certain activities due to pain.  She is unable to make her bed or do household chores without significant pain. There has been a significant decrease in her ability to perform physical activities. Patient is selling yard items due to her inability to maintain the yard.    She is currently taking methocarbamol 750 mg twice daily but is uncertain of its effectiveness - although previously was helping. She uses a cane for ambulation and notes that Norco (hydrocodone) improves her ability to walk with the cane. She reserves Norco for special occasions when increased activity is necessary. Without Norco, she is unable to walk with a cane. She is utilizing an old Rx from the dentist that she was given in December (10 tablets).     She compares her current state to her past abilities, noting a significant decrease in physical activity and endurance. She expresses disappointment in the duration of relief from previous treatments, mentioning that prior interventions did not provide long-lasting benefits.    Interval History (2/27/2025): Patient presents today for follow-up visit.  she underwent L4/5  "IL BETITO on 1/30/25.  The patient reports that she is/was better following the procedure.  she reports 80% pain relief.  The changes lasted 4 weeks so far.  The changes have continued through this visit.  Patient reports pain as "0/10 today.  She only really has pain when she stands for too long.  Overall, she is much improved since the procedure, although pain is not eradicated.  She is unable to garden due to bending over.  She does have a back brace, but it is hard to put on and off.  She will utilize this in the meantime.  She gets excellent pain relief from Robaxin.    Interval History (1/16/2025): Migdalia Luna presents today for follow-up visit.  S/p diagnostic bilateral L3-5 MBB on 08/14/2024 and 10/22/2024 with 80% pain relief - per reported.   she ultimately underwent bilateral L3-5 RFA on 11/5/24 (about 10 weeks ago).  The patient reports that she is/was unchanged following the procedure. She feels that she was coaxed into completing the 2nd MBB and ultimately the RFA. She has found no benefit from these procedures and feels at a loss at this point.  Pain is worse with walking.  She does feel that she gets some pain relief when bending forward.    Patient reports pain as 10/10 today.    Interval History (8/22/2024):  Patient presents today for follow-up visit.  Patient was last seen on  8/6/2024 bilateral L3-5 MBB with 80% relief x 1 day. She is here today because of a lack of continued pain relief and did not realize that she was not to be expected to have long term pain relief form the block procedure. She continues to have axial low back pain without radiculopathy.  Patient denies night fever/night sweats, urinary incontinence, bowel incontinence, significant weight loss and significant motor weakness.   Patient denies any other complaints or concerns at this time.     Interval History (7/25/2024):  Patient presents today for follow-up visit.  Patient was last seen almost 3 months ago. At that visit, " she had Thoracic paraspinals, middle trapezius, lumbar paraspinals, and right gluteal region TPI with limited pain relief.  Patient reports pain as 5/10 today with medication.  Today, she mostly complains of low back pain, with no radicular pain.  She reports that she has had an epidural in the past with Dr. Jumana Woo with good pain relief.  She last had a lumbar MRI in 2021.  She reports that she was very active up until July of 2023 where she was playing tennis.  Now, the pain is so severe that she is unable to walk.  This has made her mood very down.    Interval HPI (5/2/2024):  Migdalia Luna is a 84 y.o. female who presents to the clinic for a follow-up appointment for chronic upper and lower back pain. Since the last visit, Migdalia Luna states the pain has been worsening, most notably in the right lower lumbar area. Current pain intensity is 8/10.  She denies any new injuries or trauma.Patient denies night fever/night sweats, urinary incontinence, bowel incontinence, significant weight loss, significant motor weakness, and loss of sensations.    Initial HPI 02/01/2024 - Dr. Roman:  Migdalia Luna is a 84 y.o. female who presents to the clinic for the evaluation of chronic back pain.  She was referred by her primary care team for further evaluation management of this pain.  She has past medical history of migraine, depression, hypertension, hyperlipidemia, osteoporosis, GERD, insomnia, and multiple other medical comorbidities as listed in her chart.  The pain started 15+ years ago  and symptoms have been worsening.The pain is located in the midthoracic area.  The pain is described as  dull, aching  and is rated as 0/10. The pain is rated with a score of  0/10 on the BEST day and a score of 10/10 on the WORST day.  Symptoms interfere with daily activity. The pain is exacerbated by increased movement.  The pain is mitigated by rest.        Pain Disability Index (PDI) Score Review:      5/15/2025    10:38 AM  2/27/2025    12:31 PM 1/16/2025     1:14 PM   Last 3 PDI Scores   Pain Disability Index (PDI) 48 6 50           Non-Pharmacologic Treatments:  Physical Therapy/Home Exercise: no  Ice/Heat:yes  TENS: no  Acupuncture: no  Massage: yes  Chiropractic: no          Pain Medications:  - Opioids:  Norco  - Adjuvant Medications:  Ambien, Lyrica, Fosamax, Mobic, Robaxin, Imitrex, trazodone  - Anti-Coagulants:  None            Pain Procedures:     Externally:  -previous interventions, unknown types - Dr. Sarwat Roman:  -in clinic Thoracic paraspinals, middle trapezius, lumbar paraspinals, and right gluteal region TPI on 05/02/2024 with limited pain relief   -diagnostic bilateral L3-5 MBB on 08/14/2024 and 10/22/2024 with 80% reported pain relief, although patient reports less  -bilateral L3-5 RFA on 11/5/24 with limited pain relief   -L4/5 IL BETITO on 1/30/25 with 80% pain relief            Imaging/ Diagnostic Studies/ Labs (Reviewed on 5/15/2025):    01/24/2025 MRI Lumbar Spine Without Contrast  COMPARISON: Lumbar radiograph 07/25/2024    FINDINGS:  Alignment: Dextroscoliosis.  Mild L1-L2 retrolisthesis.  Grade 1 L3-L4 anterolisthesis.    Vertebrae: Lumbar vertebral body heights are maintained.  Endplate degenerative changes throughout the lumbar spine with mild T12-L1 endplate edema.  No evidence of an acute fracture.  Marrow signal is otherwise within normal limits.    Discs: Disc desiccation and height loss throughout the lumbar spine.  Height loss appears most pronounced at L5-S1.    Cord: Within normal limits.  Conus terminates at L2.    Degenerative findings:    T12-L1: Broad-based posterior disc bulge is asymmetric to the left with a superimposed left paracentral disc extrusion.  Disc material courses caudally roughly 1.2 cm.  Mild bilateral facet arthropathy contributes to mild left-sided neural foraminal stenosis.  No significant spinal canal stenosis.    L1-L2: Mild retrolisthesis.  Broad-based posterior  disc bulge is slightly asymmetric to the left.  Bilateral facet arthropathy and ligamentum flavum hypertrophy contributes to mild right and moderate left-sided neural foraminal stenosis.  No significant spinal canal stenosis.    L2-L3: Broad-based posterior disc bulge, facet arthropathy and ligamentum flavum hypertrophy contribute to moderate spinal canal stenosis, moderate right and mild left-sided neural foraminal stenosis.    L3-L4: Grade 1 anterolisthesis.  Broad-based posterior disc bulge, significant facet arthropathy and ligamentum flavum hypertrophy contribute to moderate to severe spinal canal stenosis, severe right and mild left-sided neural foraminal stenosis.    L4-L5: Broad-based posterior disc bulge involves the bilateral neural foramina with superimposed right neural foraminal disc protrusion.  Bilateral facet arthropathy and ligamentum flavum hypertrophy contribute to mild spinal canal stenosis with mild left greater than right lateral recess stenosis.  There is moderate to severe bilateral neural foraminal stenosis.    L5-S1: Broad-based posterior disc bulge involves the left greater than right neural foramina.  Minimal narrowing of the left lateral recess without significant spinal canal stenosis.  Mild facet arthropathy contributes to mild right and moderate left-sided neural foraminal stenosis.    Paraspinal muscles & soft tissues: Fatty atrophy of the posterior paraspinous musculature.  Paraspinal soft tissues are otherwise within normal limits.  Partially visualized cholelithiasis.    Impression  1. Multilevel degenerative changes of the lumbar spine are most pronounced at L3-L4 with moderate to severe spinal canal stenosis, severe right and mild left-sided neural foraminal stenosis.  2. L2-L3 moderate spinal canal stenosis with moderate right and mild left-sided neural foraminal stenosis.  3. L4-L5 mild spinal canal stenosis with moderate to severe bilateral neural foraminal stenosis.  4.  T12-L1 Modic type 1 changes.  5. Cholelithiasis.        7/25/2024 X-Ray Lumbar Complete Including Flex And Ext  FINDINGS:  Alignment is normal on the lateral view although a mild S shaped scoliosis is visible.  There is advanced degenerative disc space narrowing visible at T12-L1 through L5-S1.  Vertebral body heights are maintained. No fractures are identified.. The sacroiliac joints appear normal and symmetrical.  Normal alignment is maintained over a limited range of motion through flexion and extension.  Impression:   1.  Advanced chronic disc degeneration from T12 through S1 and mild S-shaped thoracolumbar scoliosis.  2.  No evidence of acute injury is appreciated.  3.  Normal alignment is maintained over a moderate range of motion through flexion flexion and extension       02/01/2024 X-Ray Thoracolumbar Spine AP Lateral  FINDINGS:  There is sigmoidal scoliosis thoracolumbar spine with dextroscoliosis thoracic spine, apex at T11, and levoscoliosis lumbar spine, apex at L3-4.  There is no compression fracture.  There is grade 1 spondylolisthesis with 3 mm anterolisthesis L3 on L4.  There is multilevel thoracolumbar degenerative disc disease with disc space narrowing and spurring.  Advanced lower lumbar facet arthropathy is also noted.  There is a large hiatal hernia.  Impression:   1.  Sigmoidal scoliosis thoracolumbar spine with multilevel advanced degenerative sequela.  2.  Large hiatal hernia.      CT abdomen pelvis 07/11/2022:  Visualized lung bases are clear.   Abdomen: The liver, spleen, adrenals, kidneys, and pancreas are within normal limits on this noncontrast exam. Cholelithiasis. No ascites or adenopathy. The abdominal aorta is nonaneurysmal.   Large hiatal hernia. There is inflammatory stranding associated with the right hemicolon. There are a few prominent mesenteric lymph nodes in the right lower quadrant. Normal caliber appendix.   Pelvis: No pelvic free fluid, mass, or adenopathy.     "    4/22/2021 Lumbar MRI             REVIEW OF SYSTEMS:  GENERAL:  No weight loss, malaise or fevers.  HEENT:   No recent changes in vision or hearing  NECK:  Negative for lumps, no difficulty with swallowing.  RESPIRATORY:  Negative for cough, wheezing or shortness of breath, patient denies any recent URI.  CARDIOVASCULAR:  Negative for chest pain, leg swelling or palpitations.  GI:  Negative for abdominal discomfort, blood in stools or black stools or change in bowel habits.  MUSCULOSKELETAL:  See HPI.  SKIN:  Negative for lesions, rash, and itching.  PSYCH:  No mood disorder or recent psychosocial stressors.  Patients sleep is not disturbed secondary to pain.  HEMATOLOGY/LYMPHOLOGY:  Negative for prolonged bleeding, bruising easily or swollen nodes.  Patient is not currently taking any anti-coagulants  NEURO:   No history of headaches, syncope, paralysis, seizures or tremors.  All other reviewed and negative other than HPI.          OBJECTIVE:    PHYSICAL EXAMINATION:  Vitals:    05/15/25 1037   BP: 131/66   Pulse: 85   Resp: 17   Weight: 59.7 kg (131 lb 9.8 oz)   Height: 5' 5" (1.651 m)   PainSc:   8   Body mass index is 21.9 kg/m².   (reviewed on 5/15/2025)    GENERAL: appears uncomfortable, in no acute distress, alert and oriented x3. Tearful.  PSYCH:  Mood and affect appropriate.  SKIN: Skin color, texture, turgor normal, no rashes or lesions.  HEAD/FACE:  Normocephalic, atraumatic.   PULM: No evidence of respiratory difficulty, symmetric chest rise.  BACK:  Positive Guthrie test.  SLR is Equivocal to radicular pain.  Moderate pain to palpation over the thoracic and lumbar paraspinals and lower trapezius.  Limited range of motion secondary to pain reproduction.  Axial loading positive bilaterally in lumbar spine. TTP over SIJ, L>R.  Juancho's/ Hosea's: Positive. Sacroiliac Distraction Test (anterior pressure): Positive. Sacroiliac Compression Test (lateral pressure): Positive.   EXTREMITIES: No deformities, " edema, or skin discoloration.   MUSCULOSKELETAL: Shoulder, and knee provocative maneuvers are negative.   Bilateral upper and lower extremity strength is normal and symmetric.  No atrophy or tone abnormalities are noted.  NEURO: BUE & BLE coordination and muscle stretch reflexes are physiologic and symmetric.  Plantar response are downgoing. No clonus.  No loss of sensation is noted.  GAIT: antalgic gait. Ambulates with rollator.               ASSESSMENT: 85 y.o. year old female with chronic upper and lower back pain, consistent with     1. Degenerative lumbar spinal stenosis        2. Lumbar foraminal stenosis        3. Spondylolisthesis, lumbar region        4. Primary osteoarthritis involving multiple joints        5. Lumbar disc herniation        6. Lumbar spondylosis        7. Acute exacerbation of chronic low back pain  methylPREDNISolone (MEDROL DOSEPACK) 4 mg tablet            PLAN:   - Interventions:    - Consider SIJ injection for continued pain. She wants to hold off since procedures have been providing such short term pain relief.   - Patient declined repeating epidural injection.    - S/p L4/5 IL BETITO on 1/30/25 with 80% pain relief; she feels much better than she did prior to last injection.  - S/p bilateral L3-5 RFA on 11/5/24 with limited pain relief.  - S/p Thoracic paraspinals, middle trapezius, lumbar paraspinals, and right gluteal region TPI on 05/02/2024 in clinic with Dr. Roman with minimal benefit.    - Anticoagulation use:  ASA - takes on her own     - Medications:  - Will prescribe Medrol Dose pack with instructions - for acute pain flare:  Day 1: 2 tablets before breakfast, 1 tablet after lunch, 1 tablet after dinner, 2 tablets at bedtime   Day 2: 1 tablet before breakfast, 1 tablet after lunch, 1 tablet after dinner, 2 tablets at bedtime   Day 3: 1 tablet before breakfast, 1 tablet after lunch, 1 tablet after dinner, 1 tablet at bedtime   Day 4: 1 tablet before breakfast, 1 tablet after  lunch, 1 tablet at bedtime   Day 5: 1 tablet before breakfast, 1 tablet at bedtime   Day 6: 1 tablet before breakfast.    - Start Norco 5/325mg QD PRN pain (30 tablets).   Try breaking Norco tablet in half and taking with Tylenol to see if it still provides relief.  Use sparingly, saving it for days with increased activity.   Watch for side effects, including confusion, drowsiness, and constipation.  She is also regularly taking a natural vegetative laxative.    - Refill Robaxin (methocarbamol) 750mg QHS PRN muscle spasms (or can take 1/2 tablet).  she understands this could cause drowsiness. Other common potential deleterious side effects associated with this medication including dizziness, drowsiness, dry mouth, or tingling sensation in the upper or lower extremities.   Encouraged to try to take BID for additional pain relief if doesn't cause drowsiness.    - Continue OTC Aleve 220mg x 2 tablets qam and midday, which provides pain relief.      - LA  reviewed and appropriate.        - Therapy:  Advised patient continue with activities and exercises as tolerated.      - Diagnostic/ Imaging: No new imaging ordered. Previous imaging reviewed: Lumbar MRI (01/24/2025) reviewed.     - Records: Attempted to obtain previous treatment notes from Dr. Jumana Woo; nothing received.      - Follow up visit: 2-3 months follow-up  - in clinic (per pt request) = [facility]     Future Appointments   Date Time Provider Department Center   7/17/2025 10:40 AM Mindy Sexton PA-C [facility]       - Direct patient care provided: 23+ minutes. Over 50% of the time was spent counseling/educating the patient. Total time spent on patient care including prep time reviewing the chart before the visit, time spent with patient during the visit, and the time spent after the visit reviewing studies, documenting note, obtaining information from collaborative providers, etc.: >40 minutes.       - Patient Questions: Answered all of the  patient's questions regarding diagnosis, therapy, and treatment.    - This condition does not require this patient to take time off of work, and the primary goal of our Pain Management services is to improve the patient's functional capacity.   - I discussed the risks, benefits, and alternatives to potential treatment options. All questions and concerns were fully addressed today in clinic.         Mindy Sexton PA-C  Interventional Pain Management - [facility]    Disclaimer:  This note was prepared using voice recognition system and is likely to have sound alike errors that may have been overlooked even after proof reading.  Please call me with any questions.     This note was generated with the assistance of ambient listening technology. Verbal consent was obtained by the patient and accompanying visitor(s) for the recording of patient appointment to facilitate this note. I attest to having reviewed and edited the generated note for accuracy, though some syntax or spelling errors may persist. Please contact the author of this note for any clarification.

## 2025-05-16 DIAGNOSIS — M79.18 LUMBAR MUSCLE PAIN: ICD-10-CM

## 2025-05-16 RX ORDER — METHOCARBAMOL 750 MG/1
TABLET, FILM COATED ORAL
Qty: 180 TABLET | Refills: 3 | Status: SHIPPED | OUTPATIENT
Start: 2025-05-16

## 2025-05-16 NOTE — TELEPHONE ENCOUNTER
Pt is requesting for a refill of: methocarbamoL (ROBAXIN) 750 MG Tab   Last filled:2/27/25   Last encounter: 5/16/25   Up coming apt: 7/17/25   Pharmacy:[facility] - [place]

## 2025-05-22 ENCOUNTER — PATIENT MESSAGE (OUTPATIENT)
Dept: PAIN MEDICINE | Facility: CLINIC | Age: 85
End: 2025-05-22
Payer: MEDICARE

## 2025-05-22 ENCOUNTER — HOSPITAL ENCOUNTER (OUTPATIENT)
Dept: RADIOLOGY | Facility: HOSPITAL | Age: 85
Discharge: HOME | End: 2025-05-22
Attending: INTERNAL MEDICINE
Payer: MEDICARE

## 2025-05-22 ENCOUNTER — OFFICE VISIT (OUTPATIENT)
Dept: INTERNAL MEDICINE | Facility: CLINIC | Age: 85
End: 2025-05-22
Payer: MEDICARE

## 2025-05-22 VITALS
HEIGHT: 65 IN | WEIGHT: 130.31 LBS | HEART RATE: 101 BPM | SYSTOLIC BLOOD PRESSURE: 124 MMHG | BODY MASS INDEX: 21.71 KG/M2 | TEMPERATURE: 98 F | DIASTOLIC BLOOD PRESSURE: 84 MMHG | OXYGEN SATURATION: 98 %

## 2025-05-22 DIAGNOSIS — M79.671 RIGHT FOOT PAIN: ICD-10-CM

## 2025-05-22 DIAGNOSIS — M79.621 AXILLARY PAIN, RIGHT: ICD-10-CM

## 2025-05-22 DIAGNOSIS — M79.671 RIGHT FOOT PAIN: Primary | ICD-10-CM

## 2025-05-22 PROBLEM — M87.9 OSTEONECROSIS OF MANDIBLE: Status: RESOLVED | Noted: 2024-10-17 | Resolved: 2025-05-22

## 2025-05-22 PROCEDURE — 73630 X-RAY EXAM OF FOOT: CPT | Mod: 26,RT,, | Performed by: RADIOLOGY

## 2025-05-22 PROCEDURE — G2211 COMPLEX E/M VISIT ADD ON: HCPCS | Mod: S$GLB,,, | Performed by: INTERNAL MEDICINE

## 2025-05-22 PROCEDURE — 1159F MED LIST DOCD IN RCRD: CPT | Mod: CPTII,S$GLB,, | Performed by: INTERNAL MEDICINE

## 2025-05-22 PROCEDURE — 1160F RVW MEDS BY RX/DR IN RCRD: CPT | Mod: CPTII,S$GLB,, | Performed by: INTERNAL MEDICINE

## 2025-05-22 PROCEDURE — 3288F FALL RISK ASSESSMENT DOCD: CPT | Mod: CPTII,S$GLB,, | Performed by: INTERNAL MEDICINE

## 2025-05-22 PROCEDURE — 99999 PR PBB SHADOW E&M-EST. PATIENT-LVL IV: CPT | Mod: PBBFAC,HCNC,, | Performed by: INTERNAL MEDICINE

## 2025-05-22 PROCEDURE — 1125F AMNT PAIN NOTED PAIN PRSNT: CPT | Mod: CPTII,S$GLB,, | Performed by: INTERNAL MEDICINE

## 2025-05-22 PROCEDURE — 99214 OFFICE O/P EST MOD 30 MIN: CPT | Mod: S$GLB,,, | Performed by: INTERNAL MEDICINE

## 2025-05-22 PROCEDURE — 3079F DIAST BP 80-89 MM HG: CPT | Mod: CPTII,S$GLB,, | Performed by: INTERNAL MEDICINE

## 2025-05-22 PROCEDURE — 1101F PT FALLS ASSESS-DOCD LE1/YR: CPT | Mod: CPTII,S$GLB,, | Performed by: INTERNAL MEDICINE

## 2025-05-22 PROCEDURE — 73630 X-RAY EXAM OF FOOT: CPT | Mod: TC,HCNC,FY,PO,RT

## 2025-05-22 PROCEDURE — 3074F SYST BP LT 130 MM HG: CPT | Mod: CPTII,S$GLB,, | Performed by: INTERNAL MEDICINE

## 2025-05-22 RX ORDER — MELOXICAM 15 MG/1
15 TABLET ORAL DAILY
Qty: 30 TABLET | Refills: 0 | Status: SHIPPED | OUTPATIENT
Start: 2025-05-22

## 2025-05-22 NOTE — PROGRESS NOTES
"Subjective:       Patient ID: Migdalia Luna is a 85 y.o. female.    Chief Complaint: Right Foot , pain and numbness , for months      HPI:  History of Present Illness    Patient presents today for right foot pain.    She presents with right foot pain that initially felt like a rubber band around the arch and has progressed to involve the toes and ankle. The pain, described as feeling like a cracked bone, has been present for months but intensified this week. Pain is present at rest and worsens with weight bearing. She reports pressure helps alleviate the pain. She has tried Dr. Navarro's lotion but denies use of oral pain medications. She denies any history of injury or trauma to the foot, noting symptoms developed gradually.    She reports right axillary pain present for months with recent worsening over the past few weeks. She denies palpable masses in the area. Last mammogram was performed in August or October.    She takes [facility] for blood pressure management and Norco for pain relief. A steroid prescription from pain management is pending delivery.         Review of Systems   Constitutional:  Negative for chills and fever.   Respiratory:  Negative for cough, shortness of breath and wheezing.    Cardiovascular:  Negative for chest pain and palpitations.   Gastrointestinal:  Negative for blood in stool, constipation, nausea and vomiting.   Genitourinary:  Negative for dysuria and hematuria.   Skin:  Negative for rash.       Objective:   /84 (Patient Position: Sitting)   Pulse 101   Temp 98.4 °F (36.9 °C) (Tympanic)   Ht 5' 5" (1.651 m)   Wt 59.1 kg (130 lb 4.7 oz)   SpO2 98%   BMI 21.68 kg/m²      Physical Exam  Vitals reviewed.   Constitutional:       General: She is not in acute distress.     Appearance: Normal appearance. She is well-developed. She is not ill-appearing.   HENT:      Head: Normocephalic and atraumatic.      Right Ear: External ear normal.      Left Ear: External ear normal. "   Cardiovascular:      Rate and Rhythm: Normal rate and regular rhythm.      Heart sounds: No murmur heard.     No friction rub. No gallop.      Comments: Examination of both axilla reveals no abnormalities.  No masses no irregular findings.  Breast examination reveals diffuse tenderness in both breasts, right greater than left.  No masses , no nodules, no skin changes. No nipple discharge.  Hunter Bobby MA was chaperone for exam  Pulmonary:      Effort: Pulmonary effort is normal.      Breath sounds: Normal breath sounds. No wheezing or rales.   Chest:      Chest wall: No tenderness.   Abdominal:      General: Bowel sounds are normal. There is no distension.      Palpations: Abdomen is soft.      Tenderness: There is no abdominal tenderness.   Musculoskeletal:      Comments: Examination of the right foot reveals palpable DP and PT pulses.  Tenderness along the first metatarsal full length without bony abnormality felt. Discomfort with inversion and plantar flexion in the same area.   Lymphadenopathy:      Cervical: No cervical adenopathy.   Skin:     Findings: No rash.   Neurological:      General: No focal deficit present.      Mental Status: She is alert and oriented to person, place, and time.      Cranial Nerves: No cranial nerve deficit.         No visits with results within 2 Week(s) from this visit.   Latest known visit with results is:   Lab Visit on 10/17/2024   Component Date Value    WBC 10/17/2024 9.73     RBC 10/17/2024 3.50 (L)     Hemoglobin 10/17/2024 11.5 (L)     Hematocrit 10/17/2024 38.0     MCV 10/17/2024 109 (H)     MCH 10/17/2024 32.9 (H)     MCHC 10/17/2024 30.3 (L)     RDW 10/17/2024 13.9     Platelets 10/17/2024 367     MPV 10/17/2024 10.9     Immature Granulocytes 10/17/2024 0.4     Gran # (ANC) 10/17/2024 7.1     Immature Grans (Abs) 10/17/2024 0.04     Lymph # 10/17/2024 1.5     Mono # 10/17/2024 1.0     Eos # 10/17/2024 0.1     Baso # 10/17/2024 0.02     nRBC 10/17/2024 0     Gran %  10/17/2024 72.7     Lymph % 10/17/2024 15.5 (L)     Mono % 10/17/2024 10.1     Eosinophil % 10/17/2024 1.1     Basophil % 10/17/2024 0.2     Differential Method 10/17/2024 Automated        Assessment:       1. Right foot pain    2. Axillary pain, right        Plan:   No problem-specific Assessment & Plan notes found for this encounter.    Migdalia was seen today for right foot , pain and numbness , for months.    Diagnoses and all orders for this visit:    Right foot pain  -     X-Ray Foot Complete Right; Future  -     meloxicam (MOBIC) 15 MG tablet; Take 1 tablet (15 mg total) by mouth once daily.    Axillary pain, right      Assessment & Plan      RIGHT FOOT PAIN:  - Will reassess after X-ray results and response to medication.  - If symptoms persist without improvement, will consider referral to podiatrist.  - Patient instructed to contact office if pain does not improve.  Meloxicam for discomfort    RIGHT AXILLARY PAIN:  - Patient reports pain in the right axilla with uncertain onset.  - Physical exam revealed no palpable masses, irregularities, or abnormalities in the axillary or breast tissue.  - Recommend diagnostic mammogram due to new discomfort, but patient declined at this time.  - Advised to continue self-breast exams and monitor for any changes in the axillary area.  - Patient instructed to contact the office if any masses or increased tenderness are noticed.    HYPERTENSION:  - Patient is currently taking [facility] for hypertension management.    CHRONIC PAIN MANAGEMENT:  - Patient reports chronic pain in the right foot and right axilla, with recent exacerbation in foot pain.  - Meloxicam prescribed to manage chronic foot pain.             This note was generated with the assistance of ambient listening technology. Verbal consent was obtained by the patient and accompanying visitor(s) for the recording of patient appointment to facilitate this note

## 2025-05-23 ENCOUNTER — RESULTS FOLLOW-UP (OUTPATIENT)
Dept: INTERNAL MEDICINE | Facility: CLINIC | Age: 85
End: 2025-05-23

## 2025-05-30 ENCOUNTER — TELEPHONE (OUTPATIENT)
Dept: PAIN MEDICINE | Facility: CLINIC | Age: 85
End: 2025-05-30
Payer: MEDICARE

## 2025-05-30 NOTE — TELEPHONE ENCOUNTER
Returned call to patient to reschedule 2-3 mth f/u.  I will send message to Mrs. Alcantar to reschedule.  Patient verbalized understanding

## 2025-06-30 DIAGNOSIS — M79.621 AXILLARY PAIN, RIGHT: ICD-10-CM

## 2025-06-30 DIAGNOSIS — M79.671 RIGHT FOOT PAIN: Primary | ICD-10-CM

## 2025-06-30 RX ORDER — MELOXICAM 7.5 MG/1
15 TABLET ORAL DAILY
Qty: 90 TABLET | Refills: 0 | Status: SHIPPED | OUTPATIENT
Start: 2025-06-30

## 2025-06-30 NOTE — TELEPHONE ENCOUNTER
No care due was identified.  [facility] Embedded Care Due Messages. Reference number: [id number].   6/30/2025 11:54:56 AM CDT

## 2025-07-12 DIAGNOSIS — G47.00 INSOMNIA, UNSPECIFIED TYPE: ICD-10-CM

## 2025-07-12 NOTE — TELEPHONE ENCOUNTER
No care due was identified.  [facility] Embedded Care Due Messages. Reference number: [id number].   7/12/2025 2:42:07 PM CDT

## 2025-07-14 RX ORDER — ZOLPIDEM TARTRATE 10 MG/1
10 TABLET ORAL NIGHTLY PRN
Qty: 90 TABLET | Refills: 0 | Status: SHIPPED | OUTPATIENT
Start: 2025-07-14

## 2025-08-11 DIAGNOSIS — M79.671 RIGHT FOOT PAIN: ICD-10-CM

## 2025-08-11 DIAGNOSIS — M79.621 AXILLARY PAIN, RIGHT: ICD-10-CM

## 2025-08-11 RX ORDER — MELOXICAM 7.5 MG/1
15 TABLET ORAL
Qty: 90 TABLET | Refills: 0 | Status: SHIPPED | OUTPATIENT
Start: 2025-08-11

## 2025-08-21 ENCOUNTER — OFFICE VISIT (OUTPATIENT)
Dept: PAIN MEDICINE | Facility: CLINIC | Age: 85
End: 2025-08-21
Payer: MEDICARE

## 2025-08-21 VITALS
BODY MASS INDEX: 22.03 KG/M2 | WEIGHT: 132.25 LBS | SYSTOLIC BLOOD PRESSURE: 127 MMHG | DIASTOLIC BLOOD PRESSURE: 74 MMHG | HEIGHT: 65 IN | HEART RATE: 84 BPM | RESPIRATION RATE: 17 BRPM

## 2025-08-21 DIAGNOSIS — M51.26 LUMBAR DISC HERNIATION: ICD-10-CM

## 2025-08-21 DIAGNOSIS — M15.0 PRIMARY OSTEOARTHRITIS INVOLVING MULTIPLE JOINTS: ICD-10-CM

## 2025-08-21 DIAGNOSIS — M15.0 PRIMARY OSTEOARTHRITIS INVOLVING MULTIPLE JOINTS: Primary | ICD-10-CM

## 2025-08-21 DIAGNOSIS — M43.16 SPONDYLOLISTHESIS, LUMBAR REGION: ICD-10-CM

## 2025-08-21 DIAGNOSIS — M81.8 AGE-RELATED OSTEOPOROSIS WITHOUT FRACTURE: ICD-10-CM

## 2025-08-21 DIAGNOSIS — M48.061 LUMBAR FORAMINAL STENOSIS: ICD-10-CM

## 2025-08-21 PROCEDURE — 99999 PR PBB SHADOW E&M-EST. PATIENT-LVL IV: CPT | Mod: PBBFAC,HCNC,, | Performed by: PHYSICIAN ASSISTANT

## 2025-08-21 RX ORDER — HYDROCODONE BITARTRATE AND ACETAMINOPHEN 5; 325 MG/1; MG/1
.5-1 TABLET ORAL DAILY PRN
Qty: 30 TABLET | Refills: 0 | Status: SHIPPED | OUTPATIENT
Start: 2025-08-21

## 2025-08-21 RX ORDER — PREDNISONE 5 MG/1
2.5 TABLET ORAL DAILY
Qty: 15 TABLET | Refills: 2 | Status: SHIPPED | OUTPATIENT
Start: 2025-08-21

## 2025-08-21 RX ORDER — HYDROCODONE BITARTRATE AND ACETAMINOPHEN 5; 325 MG/1; MG/1
.5-1 TABLET ORAL DAILY PRN
Qty: 30 TABLET | Refills: 0 | Status: SHIPPED | OUTPATIENT
Start: 2025-09-25

## 2025-08-22 ENCOUNTER — PATIENT MESSAGE (OUTPATIENT)
Dept: PAIN MEDICINE | Facility: CLINIC | Age: 85
End: 2025-08-22
Payer: MEDICARE